# Patient Record
Sex: FEMALE | Race: WHITE | NOT HISPANIC OR LATINO | Employment: OTHER | URBAN - METROPOLITAN AREA
[De-identification: names, ages, dates, MRNs, and addresses within clinical notes are randomized per-mention and may not be internally consistent; named-entity substitution may affect disease eponyms.]

---

## 2017-07-17 ENCOUNTER — HISTORICAL (OUTPATIENT)
Dept: ADMINISTRATIVE | Facility: HOSPITAL | Age: 67
End: 2017-07-17

## 2017-07-17 LAB
ALBUMIN SERPL-MCNC: 3.6 GM/DL (ref 3.4–5)
ALBUMIN/GLOB SERPL: 1 RATIO (ref 1–2)
ALP SERPL-CCNC: 132 UNIT/L (ref 45–117)
ALT SERPL-CCNC: 40 UNIT/L (ref 12–78)
APPEARANCE, UA: ABNORMAL
AST SERPL-CCNC: 25 UNIT/L (ref 15–37)
BACTERIA #/AREA URNS AUTO: ABNORMAL /[HPF]
BILIRUB SERPL-MCNC: 0.4 MG/DL (ref 0.2–1)
BILIRUB UR QL STRIP: NEGATIVE
BILIRUBIN DIRECT+TOT PNL SERPL-MCNC: <0.1 MG/DL
BILIRUBIN DIRECT+TOT PNL SERPL-MCNC: >0.3 MG/DL
BUN SERPL-MCNC: 9 MG/DL (ref 7–18)
CALCIUM SERPL-MCNC: 9 MG/DL (ref 8.5–10.1)
CHLORIDE SERPL-SCNC: 101 MMOL/L (ref 98–107)
CO2 SERPL-SCNC: 32 MMOL/L (ref 21–32)
COLOR UR: YELLOW
CREAT SERPL-MCNC: 1 MG/DL (ref 0.6–1.3)
GLOBULIN SER-MCNC: 4.3 GM/ML (ref 2.3–3.5)
GLUCOSE (UA): NORMAL
GLUCOSE SERPL-MCNC: 104 MG/DL (ref 74–106)
HAV IGM SERPL QL IA: NONREACTIVE
HBV CORE IGM SERPL QL IA: NONREACTIVE
HBV SURFACE AG SERPL QL IA: NEGATIVE
HCV AB SERPL QL IA: NONREACTIVE
HGB UR QL STRIP: 0.03 MG/DL
HIV 1+2 AB+HIV1 P24 AG SERPL QL IA: NONREACTIVE
HYALINE CASTS #/AREA URNS LPF: ABNORMAL /[LPF]
KETONES UR QL STRIP: ABNORMAL
LEUKOCYTE ESTERASE UR QL STRIP: 500 LEU/UL
NITRITE UR QL STRIP: NEGATIVE
PH UR STRIP: 5.5 [PH] (ref 4.5–8)
POTASSIUM SERPL-SCNC: 4.4 MMOL/L (ref 3.5–5.1)
PROT SERPL-MCNC: 7.9 GM/DL (ref 6.4–8.2)
PROT UR QL STRIP: 20 MG/DL
RBC #/AREA URNS AUTO: ABNORMAL /[HPF]
SODIUM SERPL-SCNC: 139 MMOL/L (ref 136–145)
SP GR UR STRIP: 1.03 (ref 1–1.03)
SQUAMOUS #/AREA URNS LPF: >100 /[LPF]
UROBILINOGEN UR STRIP-ACNC: 2 MG/DL
WBC #/AREA URNS AUTO: ABNORMAL /HPF

## 2017-07-19 LAB — FINAL CULTURE: NORMAL

## 2017-07-21 ENCOUNTER — HISTORICAL (OUTPATIENT)
Dept: INTERNAL MEDICINE | Facility: CLINIC | Age: 67
End: 2017-07-21

## 2017-07-21 LAB
APPEARANCE, UA: CLEAR
BACTERIA #/AREA URNS AUTO: ABNORMAL /[HPF]
BILIRUB UR QL STRIP: NEGATIVE
COLOR UR: YELLOW
GLUCOSE (UA): NORMAL
HGB UR QL STRIP: NEGATIVE
HYALINE CASTS #/AREA URNS LPF: ABNORMAL /[LPF]
KETONES UR QL STRIP: NEGATIVE
LEUKOCYTE ESTERASE UR QL STRIP: NEGATIVE
NITRITE UR QL STRIP: NEGATIVE
PH UR STRIP: 5.5 [PH] (ref 4.5–8)
PROT UR QL STRIP: NEGATIVE
RBC #/AREA URNS AUTO: ABNORMAL /[HPF]
SP GR UR STRIP: 1.01 (ref 1–1.03)
SQUAMOUS #/AREA URNS LPF: ABNORMAL /[LPF]
UROBILINOGEN UR STRIP-ACNC: NORMAL
WBC #/AREA URNS AUTO: ABNORMAL /HPF

## 2017-07-23 LAB — FINAL CULTURE: NO GROWTH

## 2017-10-17 ENCOUNTER — HISTORICAL (OUTPATIENT)
Dept: RADIOLOGY | Facility: HOSPITAL | Age: 67
End: 2017-10-17

## 2017-11-07 ENCOUNTER — HISTORICAL (OUTPATIENT)
Dept: INTERNAL MEDICINE | Facility: CLINIC | Age: 67
End: 2017-11-07

## 2017-11-07 LAB
ABS NEUT (OLG): 5.78 X10(3)/MCL (ref 2.1–9.2)
ALBUMIN SERPL-MCNC: 3.5 GM/DL (ref 3.4–5)
ALBUMIN/GLOB SERPL: 1 RATIO (ref 1–2)
ALP SERPL-CCNC: 126 UNIT/L (ref 45–117)
ALT SERPL-CCNC: 43 UNIT/L (ref 12–78)
APPEARANCE, UA: ABNORMAL
AST SERPL-CCNC: 23 UNIT/L (ref 15–37)
BACTERIA #/AREA URNS AUTO: ABNORMAL /[HPF]
BASOPHILS # BLD AUTO: 0.08 X10(3)/MCL
BASOPHILS NFR BLD AUTO: 1 % (ref 0–1)
BILIRUB SERPL-MCNC: 0.3 MG/DL (ref 0.2–1)
BILIRUB UR QL STRIP: NEGATIVE
BILIRUBIN DIRECT+TOT PNL SERPL-MCNC: <0.1 MG/DL
BILIRUBIN DIRECT+TOT PNL SERPL-MCNC: ABNORMAL MG/DL
BUN SERPL-MCNC: 10 MG/DL (ref 7–18)
CALCIUM SERPL-MCNC: 9.1 MG/DL (ref 8.5–10.1)
CHLORIDE SERPL-SCNC: 103 MMOL/L (ref 98–107)
CO2 SERPL-SCNC: 32 MMOL/L (ref 21–32)
COLOR UR: YELLOW
CREAT SERPL-MCNC: 0.9 MG/DL (ref 0.6–1.3)
CREAT UR-MCNC: 402 MG/DL
DEPRECATED CALCIDIOL+CALCIFEROL SERPL-MC: 25.37 NG/ML (ref 30–80)
EOSINOPHIL # BLD AUTO: 0.48 X10(3)/MCL
EOSINOPHIL NFR BLD AUTO: 4 % (ref 0–5)
ERYTHROCYTE [DISTWIDTH] IN BLOOD BY AUTOMATED COUNT: 12.8 % (ref 11.5–14.5)
GLOBULIN SER-MCNC: 4.6 GM/ML (ref 2.3–3.5)
GLUCOSE (UA): NORMAL
GLUCOSE SERPL-MCNC: 103 MG/DL (ref 74–106)
HCT VFR BLD AUTO: 42.1 % (ref 35–46)
HGB BLD-MCNC: 14.5 GM/DL (ref 12–16)
HGB UR QL STRIP: NEGATIVE
HYALINE CASTS #/AREA URNS LPF: ABNORMAL /[LPF]
IMM GRANULOCYTES # BLD AUTO: 0.05 10*3/UL
IMM GRANULOCYTES NFR BLD AUTO: 0 %
KETONES UR QL STRIP: ABNORMAL
LEUKOCYTE ESTERASE UR QL STRIP: 75 LEU/UL
LYMPHOCYTES # BLD AUTO: 3.55 X10(3)/MCL
LYMPHOCYTES NFR BLD AUTO: 33 % (ref 15–40)
MAGNESIUM SERPL-MCNC: 2.3 MG/DL (ref 1.8–2.4)
MCH RBC QN AUTO: 31.9 PG (ref 26–34)
MCHC RBC AUTO-ENTMCNC: 34.4 GM/DL (ref 31–37)
MCV RBC AUTO: 92.5 FL (ref 80–100)
MONOCYTES # BLD AUTO: 0.71 X10(3)/MCL
MONOCYTES NFR BLD AUTO: 7 % (ref 4–12)
NEUTROPHILS # BLD AUTO: 5.78 X10(3)/MCL
NEUTROPHILS NFR BLD AUTO: 54 X10(3)/MCL
NITRITE UR QL STRIP: NEGATIVE
PH UR STRIP: 5.5 [PH] (ref 4.5–8)
PHOSPHATE SERPL-MCNC: 3.3 MG/DL (ref 2.5–4.9)
PLATELET # BLD AUTO: 289 X10(3)/MCL (ref 130–400)
PMV BLD AUTO: 9.7 FL (ref 7.4–10.4)
POTASSIUM SERPL-SCNC: 4.2 MMOL/L (ref 3.5–5.1)
PROT SERPL-MCNC: 8.1 GM/DL (ref 6.4–8.2)
PROT UR QL STRIP: 30 MG/DL
PROT UR STRIP-MCNC: 29.7 MG/DL
PROT/CREAT UR-RTO: 73.9 MG/GM
RBC # BLD AUTO: 4.55 X10(6)/MCL (ref 4–5.2)
RBC #/AREA URNS AUTO: ABNORMAL /[HPF]
SODIUM SERPL-SCNC: 140 MMOL/L (ref 136–145)
SP GR UR STRIP: 1.03 (ref 1–1.03)
SQUAMOUS #/AREA URNS LPF: >100 /[LPF]
UROBILINOGEN UR STRIP-ACNC: 2 MG/DL
WBC # SPEC AUTO: 10.6 X10(3)/MCL (ref 4.5–11)
WBC #/AREA URNS AUTO: ABNORMAL /HPF

## 2017-11-16 ENCOUNTER — HISTORICAL (OUTPATIENT)
Dept: RADIOLOGY | Facility: HOSPITAL | Age: 67
End: 2017-11-16

## 2018-01-09 ENCOUNTER — HISTORICAL (OUTPATIENT)
Dept: INTERNAL MEDICINE | Facility: CLINIC | Age: 68
End: 2018-01-09

## 2018-01-09 LAB
ABS NEUT (OLG): 5.59 X10(3)/MCL (ref 2.1–9.2)
ALBUMIN SERPL-MCNC: 3.6 GM/DL (ref 3.4–5)
ALBUMIN/GLOB SERPL: 1 RATIO (ref 1–2)
ALP SERPL-CCNC: 124 UNIT/L (ref 45–117)
ALT SERPL-CCNC: 41 UNIT/L (ref 12–78)
APPEARANCE, UA: CLEAR
AST SERPL-CCNC: 20 UNIT/L (ref 15–37)
BACTERIA #/AREA URNS AUTO: ABNORMAL /[HPF]
BASOPHILS # BLD AUTO: 0.1 X10(3)/MCL
BASOPHILS NFR BLD AUTO: 1 % (ref 0–1)
BILIRUB SERPL-MCNC: 0.3 MG/DL (ref 0.2–1)
BILIRUB UR QL STRIP: NEGATIVE
BILIRUBIN DIRECT+TOT PNL SERPL-MCNC: <0.1 MG/DL
BILIRUBIN DIRECT+TOT PNL SERPL-MCNC: ABNORMAL MG/DL
BUN SERPL-MCNC: 11 MG/DL (ref 7–18)
CALCIUM SERPL-MCNC: 9.3 MG/DL (ref 8.5–10.1)
CHLORIDE SERPL-SCNC: 101 MMOL/L (ref 98–107)
CHOLEST SERPL-MCNC: 199 MG/DL
CHOLEST/HDLC SERPL: 4.3 {RATIO} (ref 0–4.4)
CO2 SERPL-SCNC: 31 MMOL/L (ref 21–32)
COLOR UR: YELLOW
CREAT SERPL-MCNC: 0.9 MG/DL (ref 0.6–1.3)
EOSINOPHIL # BLD AUTO: 0.56 X10(3)/MCL
EOSINOPHIL NFR BLD AUTO: 5 % (ref 0–5)
ERYTHROCYTE [DISTWIDTH] IN BLOOD BY AUTOMATED COUNT: 12.8 % (ref 11.5–14.5)
EST. AVERAGE GLUCOSE BLD GHB EST-MCNC: 123 MG/DL
GLOBULIN SER-MCNC: 4.7 GM/ML (ref 2.3–3.5)
GLUCOSE (UA): NORMAL
GLUCOSE SERPL-MCNC: 97 MG/DL (ref 74–106)
HBA1C MFR BLD: 5.9 % (ref 4.2–6.3)
HCT VFR BLD AUTO: 44.3 % (ref 35–46)
HDLC SERPL-MCNC: 46 MG/DL
HGB BLD-MCNC: 14.7 GM/DL (ref 12–16)
HGB UR QL STRIP: NEGATIVE
HYALINE CASTS #/AREA URNS LPF: ABNORMAL /[LPF]
IMM GRANULOCYTES # BLD AUTO: 0.03 10*3/UL
IMM GRANULOCYTES NFR BLD AUTO: 0 %
KETONES UR QL STRIP: NEGATIVE
LDLC SERPL CALC-MCNC: 98 MG/DL (ref 0–130)
LEUKOCYTE ESTERASE UR QL STRIP: 75 LEU/UL
LYMPHOCYTES # BLD AUTO: 3.69 X10(3)/MCL
LYMPHOCYTES NFR BLD AUTO: 35 % (ref 15–40)
MCH RBC QN AUTO: 31 PG (ref 26–34)
MCHC RBC AUTO-ENTMCNC: 33.2 GM/DL (ref 31–37)
MCV RBC AUTO: 93.5 FL (ref 80–100)
MONOCYTES # BLD AUTO: 0.67 X10(3)/MCL
MONOCYTES NFR BLD AUTO: 6 % (ref 4–12)
NEUTROPHILS # BLD AUTO: 5.59 X10(3)/MCL
NEUTROPHILS NFR BLD AUTO: 52 X10(3)/MCL
NITRITE UR QL STRIP: NEGATIVE
PH UR STRIP: 6 [PH] (ref 4.5–8)
PLATELET # BLD AUTO: 305 X10(3)/MCL (ref 130–400)
PMV BLD AUTO: 9.4 FL (ref 7.4–10.4)
POTASSIUM SERPL-SCNC: 4.3 MMOL/L (ref 3.5–5.1)
PROT SERPL-MCNC: 8.3 GM/DL (ref 6.4–8.2)
PROT UR QL STRIP: 10 MG/DL
RBC # BLD AUTO: 4.74 X10(6)/MCL (ref 4–5.2)
RBC #/AREA URNS AUTO: ABNORMAL /[HPF]
SODIUM SERPL-SCNC: 140 MMOL/L (ref 136–145)
SP GR UR STRIP: 1.01 (ref 1–1.03)
SQUAMOUS #/AREA URNS LPF: >100 /[LPF]
TRIGL SERPL-MCNC: 277 MG/DL
TSH SERPL-ACNC: 0.85 MIU/L (ref 0.36–3.74)
UROBILINOGEN UR STRIP-ACNC: NORMAL MG/DL
VLDLC SERPL CALC-MCNC: 55 MG/DL
WBC # SPEC AUTO: 10.6 X10(3)/MCL (ref 4.5–11)
WBC #/AREA URNS AUTO: ABNORMAL /HPF

## 2018-01-15 ENCOUNTER — HISTORICAL (OUTPATIENT)
Dept: CARDIOLOGY | Facility: CLINIC | Age: 68
End: 2018-01-15

## 2018-01-15 LAB — T4 FREE SERPL-MCNC: 0.91 NG/DL (ref 0.76–1.46)

## 2018-02-28 ENCOUNTER — HISTORICAL (OUTPATIENT)
Dept: CARDIOLOGY | Facility: CLINIC | Age: 68
End: 2018-02-28

## 2018-05-15 ENCOUNTER — HISTORICAL (OUTPATIENT)
Dept: ADMINISTRATIVE | Facility: HOSPITAL | Age: 68
End: 2018-05-15

## 2018-05-16 ENCOUNTER — HISTORICAL (OUTPATIENT)
Dept: RADIOLOGY | Facility: HOSPITAL | Age: 68
End: 2018-05-16

## 2018-10-15 ENCOUNTER — HISTORICAL (OUTPATIENT)
Dept: CARDIOLOGY | Facility: CLINIC | Age: 68
End: 2018-10-15

## 2018-10-18 ENCOUNTER — HISTORICAL (OUTPATIENT)
Dept: INTERNAL MEDICINE | Facility: CLINIC | Age: 68
End: 2018-10-18

## 2018-11-15 ENCOUNTER — HISTORICAL (OUTPATIENT)
Dept: INTERNAL MEDICINE | Facility: CLINIC | Age: 68
End: 2018-11-15

## 2019-01-17 ENCOUNTER — HISTORICAL (OUTPATIENT)
Dept: ADMINISTRATIVE | Facility: HOSPITAL | Age: 69
End: 2019-01-17

## 2019-01-17 ENCOUNTER — HISTORICAL (OUTPATIENT)
Dept: INTERNAL MEDICINE | Facility: CLINIC | Age: 69
End: 2019-01-17

## 2019-01-19 LAB — FINAL CULTURE: NORMAL

## 2019-01-24 ENCOUNTER — HISTORICAL (OUTPATIENT)
Dept: INTERNAL MEDICINE | Facility: CLINIC | Age: 69
End: 2019-01-24

## 2019-01-26 LAB — FINAL CULTURE: NO GROWTH

## 2019-04-10 ENCOUNTER — HISTORICAL (OUTPATIENT)
Dept: INTERNAL MEDICINE | Facility: CLINIC | Age: 69
End: 2019-04-10

## 2019-05-15 ENCOUNTER — HISTORICAL (OUTPATIENT)
Dept: INTERNAL MEDICINE | Facility: CLINIC | Age: 69
End: 2019-05-15

## 2019-06-20 ENCOUNTER — HISTORICAL (OUTPATIENT)
Dept: OTOLARYNGOLOGY | Facility: CLINIC | Age: 69
End: 2019-06-20

## 2019-09-17 ENCOUNTER — HISTORICAL (OUTPATIENT)
Dept: INTERNAL MEDICINE | Facility: CLINIC | Age: 69
End: 2019-09-17

## 2019-09-17 LAB
ABS NEUT (OLG): 5.91 X10(3)/MCL (ref 2.1–9.2)
ALBUMIN SERPL-MCNC: 3.4 GM/DL (ref 3.4–5)
ALBUMIN/GLOB SERPL: 0.8 RATIO (ref 1.1–2)
ALP SERPL-CCNC: 124 UNIT/L (ref 45–117)
ALT SERPL-CCNC: 31 UNIT/L (ref 12–78)
AST SERPL-CCNC: 22 UNIT/L (ref 15–37)
BASOPHILS # BLD AUTO: 0.1 X10(3)/MCL (ref 0–0.2)
BASOPHILS NFR BLD AUTO: 1 %
BILIRUB SERPL-MCNC: 0.3 MG/DL (ref 0.2–1)
BILIRUBIN DIRECT+TOT PNL SERPL-MCNC: <0.1 MG/DL (ref 0–0.2)
BILIRUBIN DIRECT+TOT PNL SERPL-MCNC: ABNORMAL MG/DL
BUN SERPL-MCNC: 10 MG/DL (ref 7–18)
CALCIUM SERPL-MCNC: 8.6 MG/DL (ref 8.5–10.1)
CHLORIDE SERPL-SCNC: 103 MMOL/L (ref 98–107)
CHOLEST SERPL-MCNC: 153 MG/DL
CHOLEST/HDLC SERPL: 3.4 {RATIO} (ref 0–4.4)
CO2 SERPL-SCNC: 30 MMOL/L (ref 21–32)
CREAT SERPL-MCNC: 0.9 MG/DL (ref 0.6–1.3)
EOSINOPHIL # BLD AUTO: 0.4 X10(3)/MCL (ref 0–0.9)
EOSINOPHIL NFR BLD AUTO: 4 %
ERYTHROCYTE [DISTWIDTH] IN BLOOD BY AUTOMATED COUNT: 13 % (ref 11.5–14.5)
EST. AVERAGE GLUCOSE BLD GHB EST-MCNC: 143 MG/DL
GLOBULIN SER-MCNC: 4.5 GM/ML (ref 2.3–3.5)
GLUCOSE SERPL-MCNC: 93 MG/DL (ref 74–106)
HBA1C MFR BLD: 6.6 % (ref 4.2–6.3)
HCT VFR BLD AUTO: 43.2 % (ref 35–46)
HDLC SERPL-MCNC: 45 MG/DL (ref 40–59)
HGB BLD-MCNC: 13.9 GM/DL (ref 12–16)
IMM GRANULOCYTES # BLD AUTO: 0.04 10*3/UL
IMM GRANULOCYTES NFR BLD AUTO: 0 %
LDLC SERPL CALC-MCNC: 66 MG/DL
LYMPHOCYTES # BLD AUTO: 3.3 X10(3)/MCL (ref 0.6–4.6)
LYMPHOCYTES NFR BLD AUTO: 32 %
MCH RBC QN AUTO: 30.4 PG (ref 26–34)
MCHC RBC AUTO-ENTMCNC: 32.2 GM/DL (ref 31–37)
MCV RBC AUTO: 94.5 FL (ref 80–100)
MONOCYTES # BLD AUTO: 0.7 X10(3)/MCL (ref 0.1–1.3)
MONOCYTES NFR BLD AUTO: 6 %
NEUTROPHILS # BLD AUTO: 5.91 X10(3)/MCL (ref 2.1–9.2)
NEUTROPHILS NFR BLD AUTO: 56 %
PLATELET # BLD AUTO: 295 X10(3)/MCL (ref 130–400)
PMV BLD AUTO: 9.2 FL (ref 7.4–10.4)
POTASSIUM SERPL-SCNC: 4.4 MMOL/L (ref 3.5–5.1)
PROT SERPL-MCNC: 7.9 GM/DL (ref 6.4–8.2)
RBC # BLD AUTO: 4.57 X10(6)/MCL (ref 4–5.2)
SODIUM SERPL-SCNC: 138 MMOL/L (ref 136–145)
TRIGL SERPL-MCNC: 211 MG/DL
VLDLC SERPL CALC-MCNC: 42 MG/DL
WBC # SPEC AUTO: 10.4 X10(3)/MCL (ref 4.5–11)

## 2019-10-30 ENCOUNTER — HISTORICAL (OUTPATIENT)
Dept: RADIOLOGY | Facility: HOSPITAL | Age: 69
End: 2019-10-30

## 2019-12-11 ENCOUNTER — HISTORICAL (OUTPATIENT)
Dept: INTERNAL MEDICINE | Facility: CLINIC | Age: 69
End: 2019-12-11

## 2019-12-12 ENCOUNTER — HISTORICAL (OUTPATIENT)
Dept: ADMINISTRATIVE | Facility: HOSPITAL | Age: 69
End: 2019-12-12

## 2020-05-07 ENCOUNTER — HISTORICAL (OUTPATIENT)
Dept: INTERNAL MEDICINE | Facility: CLINIC | Age: 70
End: 2020-05-07

## 2020-09-08 ENCOUNTER — HISTORICAL (OUTPATIENT)
Dept: INTERNAL MEDICINE | Facility: CLINIC | Age: 70
End: 2020-09-08

## 2020-09-15 ENCOUNTER — HISTORICAL (OUTPATIENT)
Dept: RADIOLOGY | Facility: HOSPITAL | Age: 70
End: 2020-09-15

## 2020-10-02 ENCOUNTER — HISTORICAL (OUTPATIENT)
Dept: RESPIRATORY THERAPY | Facility: HOSPITAL | Age: 70
End: 2020-10-02

## 2020-10-13 ENCOUNTER — HISTORICAL (OUTPATIENT)
Dept: RADIOLOGY | Facility: HOSPITAL | Age: 70
End: 2020-10-13

## 2021-02-24 ENCOUNTER — HISTORICAL (OUTPATIENT)
Dept: INTERNAL MEDICINE | Facility: CLINIC | Age: 71
End: 2021-02-24

## 2021-05-12 ENCOUNTER — HISTORICAL (OUTPATIENT)
Dept: NEPHROLOGY | Facility: CLINIC | Age: 71
End: 2021-05-12

## 2021-05-12 LAB
ABS NEUT (OLG): 6.16 X10(3)/MCL (ref 2.1–9.2)
ALBUMIN SERPL-MCNC: 3.5 GM/DL (ref 3.4–4.8)
ALBUMIN/GLOB SERPL: 0.9 RATIO (ref 1.1–2)
ALP SERPL-CCNC: 124 UNIT/L (ref 40–150)
ALT SERPL-CCNC: 28 UNIT/L (ref 0–55)
APPEARANCE, UA: CLEAR
AST SERPL-CCNC: 21 UNIT/L (ref 5–34)
BACTERIA #/AREA URNS AUTO: ABNORMAL /HPF
BASOPHILS # BLD AUTO: 0.1 X10(3)/MCL (ref 0–0.2)
BASOPHILS NFR BLD AUTO: 1 %
BILIRUB SERPL-MCNC: 0.4 MG/DL
BILIRUB UR QL STRIP: NEGATIVE
BILIRUBIN DIRECT+TOT PNL SERPL-MCNC: 0.2 MG/DL (ref 0–0.5)
BILIRUBIN DIRECT+TOT PNL SERPL-MCNC: 0.2 MG/DL (ref 0–0.8)
BUN SERPL-MCNC: 6.6 MG/DL (ref 9.8–20.1)
CALCIUM SERPL-MCNC: 9.8 MG/DL (ref 8.4–10.2)
CHLORIDE SERPL-SCNC: 100 MMOL/L (ref 98–107)
CO2 SERPL-SCNC: 29 MMOL/L (ref 23–31)
COLOR UR: ABNORMAL
CREAT SERPL-MCNC: 0.86 MG/DL (ref 0.55–1.02)
CREAT UR-MCNC: 48.2 MG/DL (ref 45–106)
EOSINOPHIL # BLD AUTO: 0.4 X10(3)/MCL (ref 0–0.9)
EOSINOPHIL NFR BLD AUTO: 3 %
ERYTHROCYTE [DISTWIDTH] IN BLOOD BY AUTOMATED COUNT: 13.2 % (ref 11.5–14.5)
GLOBULIN SER-MCNC: 4 GM/DL (ref 2.4–3.5)
GLUCOSE (UA): NEGATIVE
GLUCOSE SERPL-MCNC: 97 MG/DL (ref 82–115)
HCT VFR BLD AUTO: 42.7 % (ref 35–46)
HGB BLD-MCNC: 13.8 GM/DL (ref 12–16)
HGB UR QL STRIP: NEGATIVE
HYALINE CASTS #/AREA URNS LPF: ABNORMAL /LPF
IMM GRANULOCYTES # BLD AUTO: 0.04 10*3/UL
IMM GRANULOCYTES NFR BLD AUTO: 0 %
KETONES UR QL STRIP: NEGATIVE
LEUKOCYTE ESTERASE UR QL STRIP: NEGATIVE
LYMPHOCYTES # BLD AUTO: 3.4 X10(3)/MCL (ref 0.6–4.6)
LYMPHOCYTES NFR BLD AUTO: 32 %
MCH RBC QN AUTO: 30.3 PG (ref 26–34)
MCHC RBC AUTO-ENTMCNC: 32.3 GM/DL (ref 31–37)
MCV RBC AUTO: 93.8 FL (ref 80–100)
MONOCYTES # BLD AUTO: 0.7 X10(3)/MCL (ref 0.1–1.3)
MONOCYTES NFR BLD AUTO: 6 %
NEUTROPHILS # BLD AUTO: 6.16 X10(3)/MCL (ref 2.1–9.2)
NEUTROPHILS NFR BLD AUTO: 57 %
NITRITE UR QL STRIP: NEGATIVE
PH UR STRIP: 5 [PH] (ref 4.5–8)
PLATELET # BLD AUTO: 319 X10(3)/MCL (ref 130–400)
PMV BLD AUTO: 9 FL (ref 7.4–10.4)
POTASSIUM SERPL-SCNC: 4.7 MMOL/L (ref 3.5–5.1)
PROT SERPL-MCNC: 7.5 GM/DL (ref 5.8–7.6)
PROT UR QL STRIP: NEGATIVE
PROT UR STRIP-MCNC: <6.8 MG/DL
PROT/CREAT UR-RTO: NORMAL MG/GM CR
RBC # BLD AUTO: 4.55 X10(6)/MCL (ref 4–5.2)
RBC #/AREA URNS AUTO: ABNORMAL /HPF
SODIUM SERPL-SCNC: 137 MMOL/L (ref 136–145)
SP GR UR STRIP: 1 (ref 1–1.03)
SQUAMOUS #/AREA URNS LPF: ABNORMAL /LPF
UROBILINOGEN UR STRIP-ACNC: NORMAL
WBC # SPEC AUTO: 10.8 X10(3)/MCL (ref 4.5–11)
WBC #/AREA URNS AUTO: ABNORMAL /HPF

## 2021-06-15 ENCOUNTER — HISTORICAL (OUTPATIENT)
Dept: RADIOLOGY | Facility: HOSPITAL | Age: 71
End: 2021-06-15

## 2021-08-31 ENCOUNTER — HISTORICAL (OUTPATIENT)
Dept: INTERNAL MEDICINE | Facility: CLINIC | Age: 71
End: 2021-08-31

## 2021-08-31 LAB
ABS NEUT (OLG): 5.9 X10(3)/MCL (ref 2.1–9.2)
ALBUMIN SERPL-MCNC: 3.5 GM/DL (ref 3.4–4.8)
ALBUMIN/GLOB SERPL: 0.9 RATIO (ref 1.1–2)
ALP SERPL-CCNC: 103 UNIT/L (ref 40–150)
ALT SERPL-CCNC: 25 UNIT/L (ref 0–55)
AST SERPL-CCNC: 23 UNIT/L (ref 5–34)
BASOPHILS # BLD AUTO: 0.1 X10(3)/MCL (ref 0–0.2)
BASOPHILS NFR BLD AUTO: 1 %
BILIRUB SERPL-MCNC: 0.4 MG/DL
BILIRUBIN DIRECT+TOT PNL SERPL-MCNC: 0.2 MG/DL (ref 0–0.5)
BILIRUBIN DIRECT+TOT PNL SERPL-MCNC: 0.2 MG/DL (ref 0–0.8)
BUN SERPL-MCNC: 8.8 MG/DL (ref 9.8–20.1)
CALCIUM SERPL-MCNC: 9.6 MG/DL (ref 8.4–10.2)
CHLORIDE SERPL-SCNC: 101 MMOL/L (ref 98–107)
CHOLEST SERPL-MCNC: 144 MG/DL
CHOLEST/HDLC SERPL: 4 {RATIO} (ref 0–5)
CO2 SERPL-SCNC: 30 MMOL/L (ref 23–31)
CREAT SERPL-MCNC: 0.87 MG/DL (ref 0.55–1.02)
DEPRECATED CALCIDIOL+CALCIFEROL SERPL-MC: 49 NG/ML (ref 30–80)
EOSINOPHIL # BLD AUTO: 0.5 X10(3)/MCL (ref 0–0.9)
EOSINOPHIL NFR BLD AUTO: 5 %
ERYTHROCYTE [DISTWIDTH] IN BLOOD BY AUTOMATED COUNT: 13.2 % (ref 11.5–14.5)
EST. AVERAGE GLUCOSE BLD GHB EST-MCNC: 119.8 MG/DL
GLOBULIN SER-MCNC: 4.1 GM/DL (ref 2.4–3.5)
GLUCOSE SERPL-MCNC: 101 MG/DL (ref 82–115)
HBA1C MFR BLD: 5.8 %
HCT VFR BLD AUTO: 42 % (ref 35–46)
HDLC SERPL-MCNC: 36 MG/DL (ref 35–60)
HGB BLD-MCNC: 14 GM/DL (ref 12–16)
IMM GRANULOCYTES # BLD AUTO: 0.03 10*3/UL
IMM GRANULOCYTES NFR BLD AUTO: 0 %
LDLC SERPL CALC-MCNC: 70 MG/DL (ref 50–140)
LYMPHOCYTES # BLD AUTO: 3.6 X10(3)/MCL (ref 0.6–4.6)
LYMPHOCYTES NFR BLD AUTO: 33 %
MCH RBC QN AUTO: 31 PG (ref 26–34)
MCHC RBC AUTO-ENTMCNC: 33.3 GM/DL (ref 31–37)
MCV RBC AUTO: 92.9 FL (ref 80–100)
MONOCYTES # BLD AUTO: 0.7 X10(3)/MCL (ref 0.1–1.3)
MONOCYTES NFR BLD AUTO: 7 %
NEUTROPHILS # BLD AUTO: 5.9 X10(3)/MCL (ref 2.1–9.2)
NEUTROPHILS NFR BLD AUTO: 54 %
NRBC BLD AUTO-RTO: 0 % (ref 0–0.2)
PLATELET # BLD AUTO: 297 X10(3)/MCL (ref 130–400)
PMV BLD AUTO: 9 FL (ref 7.4–10.4)
POTASSIUM SERPL-SCNC: 4.8 MMOL/L (ref 3.5–5.1)
PROT SERPL-MCNC: 7.6 GM/DL (ref 5.8–7.6)
RBC # BLD AUTO: 4.52 X10(6)/MCL (ref 4–5.2)
SODIUM SERPL-SCNC: 137 MMOL/L (ref 136–145)
TRIGL SERPL-MCNC: 191 MG/DL (ref 37–140)
VLDLC SERPL CALC-MCNC: 38 MG/DL
WBC # SPEC AUTO: 10.8 X10(3)/MCL (ref 4.5–11)

## 2021-11-15 ENCOUNTER — HISTORICAL (OUTPATIENT)
Dept: RADIOLOGY | Facility: HOSPITAL | Age: 71
End: 2021-11-15

## 2022-01-04 ENCOUNTER — HISTORICAL (OUTPATIENT)
Dept: INTERNAL MEDICINE | Facility: CLINIC | Age: 72
End: 2022-01-04

## 2022-01-11 LAB
LEFT EYE DM RETINOPATHY: NEGATIVE
RIGHT EYE DM RETINOPATHY: NEGATIVE

## 2022-04-10 ENCOUNTER — HISTORICAL (OUTPATIENT)
Dept: ADMINISTRATIVE | Facility: HOSPITAL | Age: 72
End: 2022-04-10

## 2022-04-29 VITALS
DIASTOLIC BLOOD PRESSURE: 64 MMHG | DIASTOLIC BLOOD PRESSURE: 60 MMHG | HEIGHT: 62 IN | BODY MASS INDEX: 29.57 KG/M2 | BODY MASS INDEX: 27.83 KG/M2 | OXYGEN SATURATION: 99 % | WEIGHT: 160.69 LBS | SYSTOLIC BLOOD PRESSURE: 100 MMHG | WEIGHT: 151.25 LBS | WEIGHT: 162.25 LBS | BODY MASS INDEX: 27.18 KG/M2 | WEIGHT: 147.69 LBS | HEIGHT: 62 IN | DIASTOLIC BLOOD PRESSURE: 72 MMHG | BODY MASS INDEX: 29.86 KG/M2 | HEIGHT: 62 IN | SYSTOLIC BLOOD PRESSURE: 116 MMHG | DIASTOLIC BLOOD PRESSURE: 65 MMHG | SYSTOLIC BLOOD PRESSURE: 107 MMHG | SYSTOLIC BLOOD PRESSURE: 93 MMHG | HEIGHT: 62 IN | OXYGEN SATURATION: 94 %

## 2022-05-02 NOTE — HISTORICAL [facility] CERNER
This is a historical note converted from [place]. Formatting and pictures may have been removed.  Please reference [place] for original formatting and attached multimedia. Chief Complaint  6mn with labs. No cardiac complaints.  History of Present Illness  69 year old female with a past?medical history of HTN, COPD, SVT,?and Tobacco Use presents for 6 month follow up and?ongoing care.???Patient has no cardiac complaints today. ?She denies chest pain, shortness of breath, orthopnea, PND, peripheral edema, syncope, or any noticed activity limitations. ?Patient states she has?only?experienced?a few episodes of mild palpitations that she states was short-lived.? She states the palpitations did not affect her ADLs. ?She continues to follow up with?ENT clinic?for the basal cell carcinoma.??She reports compliance with her medications. ?Patient states she continues to smoke approximately half a pack of cigarettes per day,?but does not want to be referred to the smoking cessation program. ?Patient states when she is ready to quit she will try to cut back and eventually quit on her own.  Review of Systems  Constitutional: negative for fever,chills, sweats, weakness, fatigue, decreased activity?????  Eye: negative  ENMT: negative  Respiratory: negative?  Cardiovascular: rare, mild palpitations  Gastrointestinal: negative?for nausea, vomiting, abdominal pain, constipation, diarrhea  Genitourinary: negative  Hema/Lymph: negative?for bruising/bleeding tendency, negative for swollen lymph glands  Endocrine: negative  Immunologic: negative  Musculoskeletal: negative  Integumentary: negative  Neurologic: negative  Psychiatric: negative  All Other ROS: negative  Physical Exam  Vitals & Measurements  T:?37.0? ?C (Oral)? HR:?66(Peripheral)? RR:?18? BP:?116/65? SpO2:?99%? WT:?68.6?kg? WT:?68.6?kg?  General: alert and oriented/no acute distress  Eye: EOMI/normal conjunctiva/vision unchanged  HENT: normocephalic/normal hearing/moist oral  mucosa  Neck: supple/nontender/no carotid bruit/no JVD  Respiratory: exp. wheezes/nonlabored respirations/BS equal/symmetrical expansion/no chest wall tenderness  Cardiovascular: normal rate/normal rhythm/no murmur/normal peripheral perfusion/no edema  Gastrointestinal: soft/nontender/nondistended  Musculoskeletal: normal ROM/normal strength  Integumentary: warm/dry/pink/intact  Neurologic: alert/oriented/normal sensory/no focal deficits  Psychiatric: cooperative/appropriate mood and affect/normal judgment  Assessment/Plan  Advanced COPD  Denies SOB  Counseled on smoking cessation - she continues to smoke a half a pack of cigarettes per day  Continue to follow up with PCP as directed  ?  SVT (supraventricular tachycardia)  Reports rare, mild palpitations - states she feels the medications are working?  Continue diltiazem and metoprolol  ?   HTN  Controlled-continue current therapy  ?   Tobacco abuse  Counseled on the importance of smoking cessation  She continues to smoke?a half a pack of cigarettes per day, but does not want a referral to the smoking cessation program?  She states when she is ready to quit, she will try to quit on her own  ?   Basal Cell Carcinoma  Continue to follow up in the ENT clinic as directed  ?   Follow-up in cardiology clinic in 6 months  Continue to follow-up with PCP as directed  ?  ?   Problem List/Past Medical History  Ongoing  Arthritis(  Confirmed  )  COPD (chronic obstructive pulmonary disease)(  Confirmed  )  HTN (hypertension)  IGT (impaired glucose tolerance)  Osteoporosis(  Confirmed  )  SOB (shortness of breath)(  Confirmed  )  SVT (supraventricular tachycardia)  Thyroid nodule  Tobacco abuse  Historical  CKD (chronic kidney disease) stage 3, GFR 30-59 ml/min  SVT (supraventricular tachycardia)  Procedure/Surgical History  Vaginal hysterectomy (1986)  Tonsillectomy and adenoidectomy (1964)  partial hysterectomy  Tonsillectomy and adenoidectomy; age 13 or over    Medications  aspirin 81 mg oral tablet, CHEWABLE, 81 mg= 1 tab(s), Oral, Daily, 3 refills  calcium (as carbonate)-vitamin D 500 mg-125 intl units oral tablet, 1 tab(s), Oral, Daily  diltiazem 90 mg oral TABlet (immed. release), 90 mg= 1 tab(s), Oral, TID, 3 refills  Incruse Ellipta 62.5 mcg/inh inhalation powder, 1 EA, INH, q24hr, 6 refills  Metoprolol Tartrate 25 mg oral tablet, 25 mg= 1 tab(s), Oral, BID, 3 refills  Nebulizer Machine, See Instructions  Pravastatin 40 mg Oral Tab, 40 mg= 1 tab(s), Oral, Daily, 3 refills  Ventolin HFA 90 mcg/inh inhalation aerosol, 1 puff(s), INH, q4hr, PRN, 2 refills  Allergies  naproxen?(Hives)  zinc sulfate ophthalmic?(LATE EFFECT OF BURNS OF OTHER SPECIFIED SITES)  Social History  Alcohol - Denies Alcohol Use, 10/12/2014  Never, 07/17/2017  Employment/School  Unemployed, 01/11/2016  Exercise  Exercise type: does not excercise., 01/11/2016  Home/Environment  Lives with Significant other. Alcohol abuse in household: No. Substance abuse in household: No. Smoker in household: Yes. Feels unsafe at home: No. Safe place to go: Yes. Family/Friends available for support: Yes., 01/11/2016  Nutrition/Health  Regular, 01/11/2016  Sexual  Substance Abuse - Denies Substance Abuse, 12/23/2014  Never, 11/14/2017  Tobacco  10 or more cigarettes (1/2 pack or more)/day in last 30 days, Cigarettes, No, Ready to change: No. Smokeless Tobacco Use: Never., 01/17/2019  Family History  Metastatic cancer: Father.  Primary malignant neoplasm of colon: Mother.  Health Maintenance  Health Maintenance  ???Pending?(in the next year)  ??? ??OverDue  ??? ? ? ?COPD Maintenance-Spirometry due??and every?  ??? ? ? ?Pneumococcal Vaccine due??and every?  ??? ? ? ?Alcohol Misuse Screening due??07/17/18??and every 1??year(s)  ??? ??Due?  ??? ? ? ?Aspirin Therapy for CVD Prevention due??04/13/19??and every 1??year(s)  ??? ? ? ?Cognitive Screening due??04/17/19??and every 1??year(s)  ??? ? ? ?Lung Cancer Screening  due??04/17/19??and every 1??year(s)  ??? ??Due In Future?  ??? ? ? ?Functional Assessment not due until??07/16/19??and every 1??year(s)  ??? ? ? ?Advance Directive not due until??07/31/19??and every 1??year(s)  ??? ? ? ?Smoking Cessation not due until??08/09/19??and every 1??year(s)  ??? ? ? ?Colorectal Screening ([facility]) not due until??11/14/19??and every 1??year(s)  ??? ? ? ?ADL Screening not due until??01/17/20??and every 1??year(s)  ??? ? ? ?Fall Risk Assessment not due until??01/17/20??and every 1??year(s)  ??? ? ? ?Geriatric Depression Screening not due until??01/17/20??and every 1??year(s)  ??? ? ? ?Obesity Screening not due until??01/17/20??and every 1??year(s)  ???Satisfied?(in the past 1 year)  ??? ??Satisfied?  ??? ? ? ?ADL Screening on??01/17/19.??Satisfied by Tee BARTOLOMEN, Rhina Yary  ??? ? ? ?Advance Directive on??07/31/18.??Satisfied by Soniya Montano  ??? ? ? ?Blood Pressure Screening on??01/17/19.??Satisfied by Tee BARTOLOMEN, Rhina Yary  ??? ? ? ?Body Mass Index Check on??01/17/19.??Satisfied by Tee LPN, Rhina Yary  ??? ? ? ?Breast Cancer Screening ([facility]) on??10/18/18.??Satisfied by Kat Dorman  ??? ? ? ?Colorectal Screening ([facility]) on??11/14/18.??Satisfied by Luis Edmond  ??? ? ? ?Depression Screening on??01/17/19.??Satisfied by Tee LPN, Rhina Yary  ??? ? ? ?Diabetes Screening on??04/10/19.??Satisfied by Liana Espitia  ??? ? ? ?Fall Risk Assessment on??01/17/19.??Satisfied by Rhina Oliveira LPN  ??? ? ? ?Functional Assessment on??07/16/18.??Satisfied by Annette EATON, Harika  ??? ? ? ?Geriatric Depression Screening on??01/17/19.??Satisfied by Rhina Oliveira LPN  ??? ? ? ?Hypertension Management-BMP on??04/10/19.??Satisfied by Liana Espitia  ??? ? ? ?Influenza Vaccine on??01/07/19.  ??? ? ? ?Lipid Screening on??04/10/19.??Satisfied by Liana Espitia  ??? ? ? ?Obesity Screening on??01/17/19.??Satisfied by Rhina Oliveira LPN  ???  ? ? ?Smoking Cessation on??08/09/18.??Satisfied by Jennifer Kulkarni LPN  ??? ? ? ?Tetanus Vaccine on??07/16/18.??Satisfied by Rhina Oliveira LPN  ?  ?

## 2022-05-02 NOTE — HISTORICAL [facility] CERNER
This is a historical note converted from [place]. Formatting and pictures may have been removed.  Please reference [employer] for original formatting and attached multimedia. Chief Complaint  Follow up,treated recently for Bronchitis feeling a little better but still congeseted  History of Present Illness  69 year old?white female here for f/u labs. PMH SVT, HLD, HTN, CKD, COPD,?impaired glucose,?Osteopenia, basal cell carcinoma, tobacco user. Pt was seen in ED on 12/1/19 and again on 12/6/19 for Acute Bronchitis/COPD exacerbation. CXR and EKG were normal. Flu swab was negative. She was treated and DCd with Duonebs, medrol and Levaquin 500 qd x 7 days. She states the steroids were too much, and caused her too itch and yesterday she felt like her HR was elevated; when the ambulance arrived, they put her on the monitor and said it was regular and she was not in SVT. She then decided to wait for her apt with IM today. Vitals stable today. She is using her nebulizer every 6 hrs as needed. States she feels better but is not yet 100 percent, continues to have a light yellow mucous noted with cough. Resting well, and does not appear ill today. She is asking for a cream for her rash to her left arm.?Pt states she is not ready to quit smoking; 10 cig/d. Pt is followed by Renal clinic for stage 3?CKD, cardiology clinic for SVT and medications, and ENT for basal cell carcinoma (nasal lesion) and thyroid nodule. Last episode of SVT was in 2014.??Is not using?Incruse regularly due to does not like the powder.??Denies chest pain, shortness of breath,?cough, fever, headache,?dizziness, weakness, abdominal pain, nausea,?vomiting, diarrhea, constipation, dysuria, depression, anxiety.  ?  ?   Thyroid US 6/20/19 Multinodular thyroid without significant interval change in the size  of the nodules. There has been overall increase in the volume of the  left lobe of the thyroid.  Review of Systems  Constitutional: negative except as stated in  HPI  Eye: negative except as stated in HPI  ENMT: negative except as stated in HPI  Respiratory: negative except as stated in HPI  Cardiovascular: negative except as stated in HPI  Gastrointestinal: negative except as stated in HPI  Genitourinary: negative except as stated in HPI  Hema/Lymph: negative except as stated in HPI  Endocrine: negative except as stated in HPI  Immunologic: negative except as stated in HPI  Musculoskeletal: negative except as stated in HPI  Integumentary: negative except as stated in HPI  Neurologic: negative except as stated in HPI  ?   All Other ROS_ ?negative except as stated in HPI  Physical Exam  Vitals & Measurements  T:?37.1? ?C (Oral)? HR:?73(Peripheral)? RR:?20? BP:?100/64?  HT:?158?cm? WT:?72.9?kg? BMI:?29.2?  General: Alert and oriented, No acute distress.  Eye: Pupils are equal, round and reactive to light, Extraocular movements are intact, Normal conjunctiva.  HENT: Normocephalic, Normal hearing, Oral mucosa is moist,?mild pharyngeal erythema.+ Very ttp over rachel maxillary sinuses  Neck: Supple, Non-tender, No lymphadenopathy, No thyromegaly.  Respiratory: slight inspiratory and expiratory wheezes noted on auscultation throughout lung fields, Respirations are non-labored, Breath sounds are equal, Symmetrical chest wall expansion.  Cardiovascular: Normal rate, Regular rhythm, No murmur, Normal peripheral perfusion, No edema.  Gastrointestinal: Soft, Non-tender, Non-distended, Normal bowel sounds, No organomegaly.  Genitourinary: No costovertebral angle tenderness, No inguinal tenderness.  Lymphatics: No lymphadenopathy neck, axilla, groin.  Musculoskeletal: Normal range of motion, Normal strength, No tenderness, Normal gait.  Neurologic: No focal deficits, Cranial Nerves II-XII are grossly intact.  Integumentary: Warm, Dry, Intact.+ left dry scaly rash to left elbow  Feet:?? 2+ pedal pulses bilaterally.  ?  Assessment/Plan  BMI 29.0-29.9,adult?Z68.29  ?Low fat diet and 150  minutes?of aerobic exercise per week  Ordered:  1160F- Medication reconciliation completed during visit, HTN (hypertension)  CKD (chronic kidney disease) stage 3, GFR 30-59 ml/min  HLD (hyperlipidemia)  T2DM (type 2 diabetes mellitus)  Tobacco abuse  COPD (chronic obstructive pulmonary disease)  Sinusitis  BMI 29.0-29.9,adult, [facility] Int Med C, 12/12...  Clinic Follow up, *Est. 04/12/20 3:00:00 CDT, Order for future visit, HTN (hypertension)  CKD (chronic kidney disease) stage 3, GFR 30-59 ml/min  HLD (hyperlipidemia)  T2DM (type 2 diabetes mellitus)  Tobacco abuse  COPD (chronic obstructive pulmonary disease)  S...  Office/Outpatient Visit Level 4 Established 48598 PC, HTN (hypertension)  CKD (chronic kidney disease) stage 3, GFR 30-59 ml/min  HLD (hyperlipidemia)  T2DM (type 2 diabetes mellitus)  Tobacco abuse  COPD (chronic obstructive pulmonary disease)  Sinusitis  BMI 29.0-29.9,adult, [facility] Int [facility] C, 12/12...  ?  CKD (chronic kidney disease) stage 3, GFR 30-59 ml/min?N18.3  ?GFR 66, stable for pt. Renoprotective measures discussed:  ?   -Comply with renal diet (reduce intake of milk and cheese,??dried beans,?peas,?sylvie,?nuts?and peanut butter)??and low sodium diet (2 grams a day)  -Control diabetes (goal A1C <7.0%)  -Control high blood pressure (?goal BP < 130/80, please record BP at home every day and bring log to next office visit)  -Exercise at least 30 minutes a day, 5 days a week.  -Maintain healthy weight.  -Decrease or stop alcohol use  -Do not smoke  -Stay well hydrated  -Receive Pneumovax, Flu, and HBV vaccines if indicated.  -Do not take NSAIDs (Ibuprofen, Naproxen, Aleve, Advil, Toradol, Mobic), may take only Tylenol as needed for pain/headaches.  -Take cholesterol-lowering medications as prescribed (LDL goal <100)  Ordered:  1160F- Medication reconciliation completed during visit, HTN (hypertension)  CKD (chronic kidney disease) stage 3, GFR 30-59 ml/min  HLD (hyperlipidemia)   T2DM (type 2 diabetes mellitus)  Tobacco abuse  COPD (chronic obstructive pulmonary disease)  Sinusitis  BMI 29.0-29.9,adult, [facility] Int Med C, 12/12...  Clinic Follow up, *Est. 04/12/20 3:00:00 CDT, Order for future visit, HTN (hypertension)  CKD (chronic kidney disease) stage 3, GFR 30-59 ml/min  HLD (hyperlipidemia)  T2DM (type 2 diabetes mellitus)  Tobacco abuse  COPD (chronic obstructive pulmonary disease)  S...  Office/Outpatient Visit Level 4 Established 82201 PC, HTN (hypertension)  CKD (chronic kidney disease) stage 3, GFR 30-59 ml/min  HLD (hyperlipidemia)  T2DM (type 2 diabetes mellitus)  Tobacco abuse  COPD (chronic obstructive pulmonary disease)  Sinusitis  BMI 29.0-29.9,adult, [facility] Int [facility], 12/12...  ?  COPD (chronic obstructive pulmonary disease)?J44.9  Adding Incruse (LAMA), Pt is only using Symbicort (LABA/ICS) once a day and was informed of the correct usage which is BID. Continue Nebs every 4-6 hrs prn  Repeat CXR today  Advised smoking cessation; pt continues to smoke 1/2ppd  ED precautions for acute SOB, CP, palpitations or fever  Ordered:  1160F- Medication reconciliation completed during visit, HTN (hypertension)  CKD (chronic kidney disease) stage 3, GFR 30-59 ml/min  HLD (hyperlipidemia)  T2DM (type 2 diabetes mellitus)  Tobacco abuse  COPD (chronic obstructive pulmonary disease)  Sinusitis  BMI 29.0-29.9,adult, [facility] Int Med C, 12/12...  Clinic Follow up, *Est. 04/12/20 3:00:00 CDT, Order for future visit, HTN (hypertension)  CKD (chronic kidney disease) stage 3, GFR 30-59 ml/min  HLD (hyperlipidemia)  T2DM (type 2 diabetes mellitus)  Tobacco abuse  COPD (chronic obstructive pulmonary disease)  S...  Office/Outpatient Visit Level 4 Established 08441 PC, HTN (hypertension)  CKD (chronic kidney disease) stage 3, GFR 30-59 ml/min  HLD (hyperlipidemia)  T2DM (type 2 diabetes mellitus)  Tobacco abuse  COPD (chronic obstructive pulmonary disease)  Sinusitis  BMI  29.0-29.9,adult, [facility] Int Med C, 12/12...  XR Chest 2 Views, Routine, *Est. 12/12/19 3:00:00 CST, COPD, None, Ambulatory, Rad Type, Order for future visit, COPD (chronic obstructive pulmonary disease)  Sinusitis, Not Scheduled, *Est. 12/12/19 3:00:00 CST  ?  Eczema?L30.9  ?triamcinolone with Aquaphor otc  ?  HLD (hyperlipidemia)?E78.5  LDL at goal; 54. Trig 183H. Continue pravastatin 40 at bedtime, adding otc coq10. Low fat diet; more whole grain products, fruits, vegetables, lean meats, fish, and poultry.  Ordered:  1160F- Medication reconciliation completed during visit, HTN (hypertension)  CKD (chronic kidney disease) stage 3, GFR 30-59 ml/min  HLD (hyperlipidemia)  T2DM (type 2 diabetes mellitus)  Tobacco abuse  COPD (chronic obstructive pulmonary disease)  Sinusitis  BMI 29.0-29.9,adult, [facility] Int Med C, 12/12...  Clinic Follow up, *Est. 04/12/20 3:00:00 CDT, Order for future visit, HTN (hypertension)  CKD (chronic kidney disease) stage 3, GFR 30-59 ml/min  HLD (hyperlipidemia)  T2DM (type 2 diabetes mellitus)  Tobacco abuse  COPD (chronic obstructive pulmonary disease)  S...  Office/Outpatient Visit Level 4 Established 22120 PC, HTN (hypertension)  CKD (chronic kidney disease) stage 3, GFR 30-59 ml/min  HLD (hyperlipidemia)  T2DM (type 2 diabetes mellitus)  Tobacco abuse  COPD (chronic obstructive pulmonary disease)  Sinusitis  BMI 29.0-29.9,adult, [facility] Int [facility] C, 12/12...  ?  HTN (hypertension)?I10  ?BP and HR stable today. Continue Cardizem and metoprolol. EKG NSR in ED. Keep Cardiology apt this month. DASH diet: Eat more fruits, vegetables, and low fat dairy foods. Low sodium diet.  Ordered:  1160F- Medication reconciliation completed during visit, HTN (hypertension)  CKD (chronic kidney disease) stage 3, GFR 30-59 ml/min  HLD (hyperlipidemia)  T2DM (type 2 diabetes mellitus)  Tobacco abuse  COPD (chronic obstructive pulmonary disease)  Sinusitis  BMI 29.0-29.9,adult, [facility] Int Med C,  12/12...  Clinic Follow up, *Est. 04/12/20 3:00:00 CDT, Order for future visit, HTN (hypertension)  CKD (chronic kidney disease) stage 3, GFR 30-59 ml/min  HLD (hyperlipidemia)  T2DM (type 2 diabetes mellitus)  Tobacco abuse  COPD (chronic obstructive pulmonary disease)  S...  Office/Outpatient Visit Level 4 Established 80384 PC, HTN (hypertension)  CKD (chronic kidney disease) stage 3, GFR 30-59 ml/min  HLD (hyperlipidemia)  T2DM (type 2 diabetes mellitus)  Tobacco abuse  COPD (chronic obstructive pulmonary disease)  Sinusitis  BMI 29.0-29.9,adult, [facility] Int [facility] C, 12/12...  ?  Sinusitis?J32.9  Start on DOXY 100 bid x10 days.  Continue loratadine.  Fluids/rest  Ordered:  1160F- Medication reconciliation completed during visit, HTN (hypertension)  CKD (chronic kidney disease) stage 3, GFR 30-59 ml/min  HLD (hyperlipidemia)  T2DM (type 2 diabetes mellitus)  Tobacco abuse  COPD (chronic obstructive pulmonary disease)  Sinusitis  BMI 29.0-29.9,adult, [facility] Int [facility], 12/12...  Clinic Follow up, *Est. 04/12/20 3:00:00 CDT, Order for future visit, HTN (hypertension)  CKD (chronic kidney disease) stage 3, GFR 30-59 ml/min  HLD (hyperlipidemia)  T2DM (type 2 diabetes mellitus)  Tobacco abuse  COPD (chronic obstructive pulmonary disease)  S...  Office/Outpatient Visit Level 4 Established 71306 , HTN (hypertension)  CKD (chronic kidney disease) stage 3, GFR 30-59 ml/min  HLD (hyperlipidemia)  T2DM (type 2 diabetes mellitus)  Tobacco abuse  COPD (chronic obstructive pulmonary disease)  Sinusitis  BMI 29.0-29.9,adult, [facility], 12/12...  XR Chest 2 Views, Routine, *Est. 12/12/19 3:00:00 CST, COPD, None, Ambulatory, Rad Type, Order for future visit, COPD (chronic obstructive pulmonary disease)  Sinusitis, Not Scheduled, *Est. 12/12/19 3:00:00 CST  ?  T2DM (type 2 diabetes mellitus)?E11.9  ?A1c at goal; 6.4. Continue metformin 500 daily.  ADA diet and strict glucose monitoring  advised.  Ordered:  1160F- Medication reconciliation completed during visit, HTN (hypertension)  CKD (chronic kidney disease) stage 3, GFR 30-59 ml/min  HLD (hyperlipidemia)  T2DM (type 2 diabetes mellitus)  Tobacco abuse  COPD (chronic obstructive pulmonary disease)  Sinusitis  BMI 29.0-29.9,adult, [facility] Int Med C, 12/12...  Clinic Follow up, *Est. 04/12/20 3:00:00 CDT, Order for future visit, HTN (hypertension)  CKD (chronic kidney disease) stage 3, GFR 30-59 ml/min  HLD (hyperlipidemia)  T2DM (type 2 diabetes mellitus)  Tobacco abuse  COPD (chronic obstructive pulmonary disease)  S...  Office/Outpatient Visit Level 4 Established 20133 PC, HTN (hypertension)  CKD (chronic kidney disease) stage 3, GFR 30-59 ml/min  HLD (hyperlipidemia)  T2DM (type 2 diabetes mellitus)  Tobacco abuse  COPD (chronic obstructive pulmonary disease)  Sinusitis  BMI 29.0-29.9,adult, [facility] Int Med C, 12/12...  ?  Tobacco abuse?Z72.0  ?Advised smoking cessation; pt continues to smoke 1/2ppd  Ordered:  1160F- Medication reconciliation completed during visit, HTN (hypertension)  CKD (chronic kidney disease) stage 3, GFR 30-59 ml/min  HLD (hyperlipidemia)  T2DM (type 2 diabetes mellitus)  Tobacco abuse  COPD (chronic obstructive pulmonary disease)  Sinusitis  BMI 29.0-29.9,adult, [facility] Int Med C, 12/12...  Clinic Follow up, *Est. 04/12/20 3:00:00 CDT, Order for future visit, HTN (hypertension)  CKD (chronic kidney disease) stage 3, GFR 30-59 ml/min  HLD (hyperlipidemia)  T2DM (type 2 diabetes mellitus)  Tobacco abuse  COPD (chronic obstructive pulmonary disease)  S...  Office/Outpatient Visit Level 4 Established 22322 PC, HTN (hypertension)  CKD (chronic kidney disease) stage 3, GFR 30-59 ml/min  HLD (hyperlipidemia)  T2DM (type 2 diabetes mellitus)  Tobacco abuse  COPD (chronic obstructive pulmonary disease)  Sinusitis  BMI 29.0-29.9,adult, [facility] Int [facility] C, 12/12...  ?  Orders:  albuterol, 1 puff(s), INH,  q4hr, PRN PRN shortness of breath or wheezing, # 1 EA, 2 Refill(s), Pharmacy: [id number]  albuterol, 2.5 mg = 3 mL, NEB, q6hr, PRN PRN as needed for wheezing, # 90 mL, 3 Refill(s), Pharmacy: [facility]  budesonide-formoterol, 2 puff(s), INH, BID, # 1 EA, 6 Refill(s), Pharmacy: [facility]  doxycycline, 100 mg = 1 tab(s), Oral, BID, X 10 day(s), # 20 tab(s), 0 Refill(s), Pharmacy: [facility]  metFORMIN, 500 mg = 1 tab(s), Oral, Daily, with meals, # 90 tab(s), 2 Refill(s), Pharmacy: [facility]  [facility] Prescription, Glucometer test strips and lancets, See Instructions, Use daily to check glucose E11.9, # 100 EA, 6 Refill(s), Pharmacy: [facility]  pravastatin, 40 mg = 1 tab(s), Oral, Daily, # 90 tab(s), 1 Refill(s), Pharmacy: [facility]  triamcinolone topical, 1 ramon, TOP, BID, PRN PRN arm rash, # 30 gm, 1 Refill(s), Pharmacy: [facility]  umeclidinium, 1 EA, INH, q24hr, doses should be taken at least 24 hours apart, # 30 EA, 3 Refill(s), Pharmacy: [facility]  Labs thoroughly reviewed with patient. Medication refills addressed today.  RTC prn and 3-4 months, with labs 1 week prior to the apt.  Patient voices understanding of all discharge instructions.?  Referrals  Clinic Follow up, *Est. 04/12/20 3:00:00 CDT, Order for future visit, HTN (hypertension)  CKD (chronic kidney disease) stage 3, GFR 30-59 ml/min  HLD (hyperlipidemia)  T2DM (type 2 diabetes mellitus)  Tobacco abuse  COPD (chronic obstructive pulmonary disease)  S...   Problem List/Past Medical History  Ongoing  Arthritis(  Confirmed  )  COPD (chronic obstructive pulmonary disease)(  Confirmed  )  HTN (hypertension)  IGT (impaired glucose tolerance)  Osteoporosis(  Confirmed  )  SOB (shortness of breath)(  Confirmed  )  SVT (supraventricular tachycardia)  Thyroid nodule  Tobacco abuse  Historical  CKD (chronic kidney disease) stage 3, GFR 30-59  ml/min  SVT (supraventricular tachycardia)  Procedure/Surgical History  Vaginal hysterectomy (1986)  Tonsillectomy and adenoidectomy (1964)  partial hysterectomy  Tonsillectomy and adenoidectomy; age 13 or over   Medications  albuterol 0.083% inhalation solution, 2.5 mg= 3 mL, NEB, q6hr, PRN, 3 refills  aspirin 81 mg oral tablet, CHEWABLE, 81 mg= 1 tab(s), Oral, Daily, 3 refills  budesonide-formoterol 160 mcg-4.5 mcg/inh inhalation aerosol, 2 puff(s), INH, BID, 6 refills  calcium (as carbonate)-vitamin D 500 mg-125 intl units oral tablet, 1 tab(s), Oral, Daily  diltiazem 90 mg oral TABlet (immed. release), 90 mg= 1 tab(s), Oral, TID, 3 refills  DuoNeb 0.5 mg-2.5 mg/3 mL inhalation solution, 3 mL, INH, QID,? ?Unable to obtain  DuoNeb 0.5 mg-2.5 mg/3 mL inhalation solution, 3 mL, INH, QID, PRN, 1 refills,? ?Unable to obtain  Glucometer, See Instructions  Glucometer test strips and lancets, See Instructions, 6 refills  Levaquin 500 mg oral tablet, 500 mg= 1 tab(s), Oral, q24hr,? ?Not Taking, Completed Rx  loratadine 10 mg oral tablet, 10 mg= 1 tab(s), Oral, Daily  metformin 500 mg oral tablet, 500 mg= 1 tab(s), Oral, Daily, 6 refills  methylPREDNISolone 4 mg oral tab,? ?Not Taking, Completed Rx  Metoprolol Tartrate 25 mg oral tablet, 25 mg= 1 tab(s), Oral, BID, 3 refills  Nebulizer Machine, See Instructions  Pravastatin 40 mg Oral Tab, 40 mg= 1 tab(s), Oral, Daily, 1 refills  Ventolin HFA 90 mcg/inh inhalation aerosol, 1 puff(s), INH, q4hr, PRN, 2 refills  Allergies  naproxen?(Hives)  zinc sulfate ophthalmic?(LATE EFFECT OF BURNS OF OTHER SPECIFIED SITES)  Social History  Abuse/Neglect  No, 12/06/2019  No, 12/01/2019  Alcohol - Denies Alcohol Use, 10/12/2014  Never, 07/17/2017  Employment/School  Unemployed, 01/11/2016  Exercise  Exercise type: does not excercise., 01/11/2016  Home/Environment  Lives with Significant other. Alcohol abuse in household: No. Substance abuse in household: No. Smoker in household: Yes.  Feels unsafe at home: No. Safe place to go: Yes. Family/Friends available for support: Yes., 01/11/2016  Nutrition/Health  Regular, 01/11/2016  Sexual  Gender Identity Identifies as female., 04/17/2019  Substance Use - Denies Substance Abuse, 12/23/2014  Never, 11/14/2017  Tobacco  10 or more cigarettes (1/2 pack or more)/day in last 30 days, Cigarettes, No, 12/06/2019  10 or more cigarettes (1/2 pack or more)/day in last 30 days, No, 12/01/2019  Family History  Metastatic cancer: Father.  Primary malignant neoplasm of colon: Mother.  Immunizations  Vaccine Date Status Comments   influenza virus vaccine, inactivated 11/15/2018 Given    tetanus/diphtheria/pertussis, acel(Tdap) 07/16/2018 Given    influenza virus vaccine, inactivated 11/14/2017 Given LOT [id number] EXP. 04- GIVEN INFLUENZA 0.5ML IM INJECTION RT. DELOID. TOLERATED WELL.   pneumococcal 23-polyvalent vaccine 07/17/2017 Given    influenza virus vaccine, inactivated 01/23/2017 Given    influenza virus vaccine, inactivated 01/11/2016 Given    influenza virus vaccine, inactivated 10/13/2010 Recorded    hepatitis A adult vaccine 09/08/2005 Recorded    tetanus-diphtheria toxoids 09/08/2005 Recorded    Health Maintenance  Health Maintenance  ???Pending?(in the next year)  ??? ??OverDue  ??? ? ? ?COPD Maintenance-Spirometry due??and every?  ??? ? ? ?Pneumococcal Vaccine due??and every?  ??? ? ? ?Smoking Cessation (Diabetes) due??11/14/19??and every 2??year(s)  ??? ??Due?  ??? ? ? ?Colorectal Screening ([facility]) due??11/14/19??and every 1??year(s)  ??? ? ? ?Lung Cancer Screening due??12/12/19??and every 1??year(s)  ??? ? ? ?Zoster Vaccine due??12/12/19??and every 100??year(s)  ??? ??Refused?  ??? ? ? ?Smoking Cessation due??01/01/19??and every 1??year(s)  ??? ? ? ?Advance Directive due??01/01/20??and every 1??year(s)  ??? ??Due In Future?  ??? ? ? ?Alcohol Misuse Screening not due until??01/01/20??and every 1??year(s)  ??? ? ? ?Cognitive Screening not  due until??01/01/20??and every 1??year(s)  ??? ? ? ?Fall Risk Assessment not due until??01/01/20??and every 1??year(s)  ??? ? ? ?Functional Assessment not due until??01/01/20??and every 1??year(s)  ??? ? ? ?Geriatric Depression Screening not due until??01/01/20??and every 1??year(s)  ??? ? ? ?Obesity Screening not due until??01/01/20??and every 1??year(s)  ??? ? ? ?Aspirin Therapy for CVD Prevention not due until??04/17/20??and every 1??year(s)  ??? ? ? ?Diabetes Maintenance-Foot Exam not due until??09/23/20??and every 1??year(s)  ??? ? ? ?ADL Screening not due until??09/24/20??and every 1??year(s)  ??? ? ? ?Diabetes Maintenance-Eye Exam not due until??09/25/20??and every 1??year(s)  ??? ? ? ?Diabetes Maintenance-HgbA1c not due until??12/10/20??and every 1??year(s)  ??? ? ? ?Diabetes Maintenance-Fasting Lipid Profile not due until??12/10/20??and every 1??year(s)  ???Satisfied?(in the past 1 year)  ??? ??Satisfied?  ??? ? ? ?ADL Screening on??09/24/19.??Satisfied by Abiola Shrestha LPN.  ??? ? ? ?Advance Directive on??05/15/19.??Satisfied by Savannah Gonzalez  ??? ? ? ?Alcohol Misuse Screening on??09/24/19.??Satisfied by Harika Dye  ??? ? ? ?Aspirin Therapy for CVD Prevention on??04/17/19.??Satisfied by Alma Warner  ??? ? ? ?Blood Pressure Screening on??12/06/19.??Satisfied by Thanh Owen  ??? ? ? ?Body Mass Index Check on??12/06/19.??Satisfied by Parth Moya RN, Yogesh  ??? ? ? ?Breast Cancer Screening ([facility]) on??10/30/19.??Satisfied by Jeanine Castro  ??? ? ? ?Cognitive Screening on??05/15/19.??Satisfied by Savannah Gonzalez  ??? ? ? ?Depression Screening on??09/24/19.??Satisfied by Abiola Shrestha LPN.  ??? ? ? ?Diabetes Maintenance-HgbA1c on??12/11/19.??Satisfied by Zelda Solano  ??? ? ? ?Diabetes Screening on??12/11/19.??Satisfied by Zelda Solano  ??? ? ? ?Fall Risk Assessment on??12/01/19.??Satisfied by Sabine GAXIOLA, Christy  ??? ? ? ?Functional Assessment  on??05/15/19.??Satisfied by Savannah Gonzalez  ??? ? ? ?Geriatric Depression Screening on??01/17/19.??Satisfied by Rhina Oliveira LPN  ??? ? ? ?Hypertension Management-BMP on??12/11/19.??Satisfied by Zelda Solano  ??? ? ? ?Influenza Vaccine on??01/07/19.  ??? ? ? ?Lipid Screening on??12/11/19.??Satisfied by Zelda Solano  ??? ? ? ?Obesity Screening on??12/06/19.??Satisfied by Parth Moya RN, Yogesh  ??? ??Refused?  ??? ? ? ?Advance Directive on??05/15/19.??Recorded by Savannah Gonzalez??Reason: Patient Refuses  ??? ? ? ?Smoking Cessation on??05/15/19.??Recorded by Savannah Gonzalez  ?

## 2022-05-09 DIAGNOSIS — N18.9 CHRONIC KIDNEY DISEASE, UNSPECIFIED CKD STAGE: ICD-10-CM

## 2022-05-09 DIAGNOSIS — I10 HYPERTENSION, UNSPECIFIED TYPE: Primary | ICD-10-CM

## 2022-06-25 ENCOUNTER — HOSPITAL ENCOUNTER (EMERGENCY)
Facility: HOSPITAL | Age: 72
Discharge: HOME | End: 2022-06-25
Attending: INTERNAL MEDICINE
Payer: COMMERCIAL

## 2022-06-25 VITALS
BODY MASS INDEX: 28.12 KG/M2 | OXYGEN SATURATION: 93 % | TEMPERATURE: 98 F | SYSTOLIC BLOOD PRESSURE: 137 MMHG | DIASTOLIC BLOOD PRESSURE: 66 MMHG | WEIGHT: 154.75 LBS | RESPIRATION RATE: 24 BRPM | HEART RATE: 69 BPM

## 2022-06-25 DIAGNOSIS — M77.9 INFLAMMATION AROUND JOINT: ICD-10-CM

## 2022-06-25 DIAGNOSIS — M62.838 NECK MUSCLE SPASM: Primary | ICD-10-CM

## 2022-06-25 PROCEDURE — 96372 THER/PROPH/DIAG INJ SC/IM: CPT | Performed by: INTERNAL MEDICINE

## 2022-06-25 PROCEDURE — 99284 EMERGENCY DEPT VISIT MOD MDM: CPT | Mod: 25

## 2022-06-25 PROCEDURE — 63600175 PHARM REV CODE 636 W HCPCS: Performed by: INTERNAL MEDICINE

## 2022-06-25 PROCEDURE — 25000003 PHARM REV CODE 250: Performed by: INTERNAL MEDICINE

## 2022-06-25 RX ORDER — METHOCARBAMOL 500 MG/1
1000 TABLET, FILM COATED ORAL 2 TIMES DAILY PRN
Qty: 20 TABLET | Refills: 0 | Status: SHIPPED | OUTPATIENT
Start: 2022-06-25 | End: 2022-06-30

## 2022-06-25 RX ORDER — HYDROCODONE BITARTRATE AND ACETAMINOPHEN 5; 325 MG/1; MG/1
1 TABLET ORAL EVERY 8 HOURS PRN
Qty: 10 TABLET | Refills: 0 | Status: SHIPPED | OUTPATIENT
Start: 2022-06-25 | End: 2022-07-11

## 2022-06-25 RX ORDER — KETOROLAC TROMETHAMINE 30 MG/ML
30 INJECTION, SOLUTION INTRAMUSCULAR; INTRAVENOUS
Status: COMPLETED | OUTPATIENT
Start: 2022-06-25 | End: 2022-06-25

## 2022-06-25 RX ORDER — METHOCARBAMOL 500 MG/1
500 TABLET, FILM COATED ORAL
Status: COMPLETED | OUTPATIENT
Start: 2022-06-25 | End: 2022-06-25

## 2022-06-25 RX ADMIN — KETOROLAC TROMETHAMINE 30 MG: 30 INJECTION, SOLUTION INTRAMUSCULAR at 08:06

## 2022-06-25 RX ADMIN — METHOCARBAMOL 500 MG: 500 TABLET ORAL at 08:06

## 2022-06-25 NOTE — ED PROVIDER NOTES
Encounter Date: 6/25/2022       History     Chief Complaint   Patient presents with    Neck Pain     C/o pain from right side of neck down towards shoulder x 3 days. Denies any injury     Right shoulder pain for the last 2 days. Pain is constant, radiates to her neck, worse with movement and palpation. Used aspiring and advil, also cold compress without improvement. Denies injury, rash, fever.    The history is provided by the patient.     Review of patient's allergies indicates:   Allergen Reactions    Naproxen Hives    Zinc sulfate      Other reaction(s): LATE EFFECT OF BURNS OF OTHER SPECIFIED SITES   **Pt states she takes advil, aspirin, Pepto bismol, without problems, think she had toradol in the past w/o problems. Accepts trying toradol IM for her pain today, understand it could cause her an allergic reaction too.  History reviewed. No pertinent past medical history.  History reviewed. No pertinent surgical history.  History reviewed. No pertinent family history.  Social History     Tobacco Use    Smoking status: Never Smoker    Smokeless tobacco: Never Used   Substance Use Topics    Drug use: Never     Review of Systems   Constitutional: Negative for fever.   HENT: Negative for sore throat.    Respiratory: Positive for wheezing (States Hx of COPD, took nebs PTA). Negative for shortness of breath.    Cardiovascular: Negative for chest pain.   Gastrointestinal: Negative for nausea.   Genitourinary: Negative for dysuria.   Musculoskeletal: Positive for arthralgias and back pain.   Skin: Negative for rash.   Neurological: Negative for weakness.   Hematological: Does not bruise/bleed easily.   All other systems reviewed and are negative.      Physical Exam     Initial Vitals [06/25/22 0825]   BP Pulse Resp Temp SpO2   137/66 69 (!) 24 98.1 °F (36.7 °C) (!) 93 %      MAP       --         Physical Exam    Nursing note and vitals reviewed.  Constitutional: She appears well-developed and well-nourished.   HENT:    Head: Normocephalic and atraumatic.   Mouth/Throat: Oropharynx is clear and moist.   Eyes: Pupils are equal, round, and reactive to light.   Neck: Neck supple.   Normal range of motion.  Cardiovascular: Normal rate, regular rhythm, normal heart sounds and intact distal pulses.   Pulmonary/Chest: She has wheezes (Mild, expiratory).   Abdominal: Abdomen is soft. Bowel sounds are normal. She exhibits no distension. There is no abdominal tenderness. There is no rebound and no guarding.   Musculoskeletal:         General: Tenderness (Rt shoulder among sternocleidomastoid area) present. No edema. Normal range of motion.      Cervical back: Normal range of motion and neck supple.     Neurological: She is alert and oriented to person, place, and time. She has normal strength.   Skin: Skin is warm and dry. No rash noted.   Psychiatric: Her behavior is normal.         ED Course   Procedures  Labs Reviewed - No data to display       Imaging Results          X-Ray Shoulder Complete 2 View Right (Final result)  Result time 06/25/22 09:48:21    Final result by Frandy Rodriguez MD (06/25/22 09:48:21)                 Impression:      No acute findings.      Electronically signed by: Frandy Rodriguez  Date:    06/25/2022  Time:    09:48             Narrative:    EXAMINATION:  XR SHOULDER COMPLETE 2 OR MORE VIEWS RIGHT    CLINICAL HISTORY:  Enthesopathy, unspecified    COMPARISON:  None    FINDINGS:  Three views right shoulder.  There is no fracture or dislocation.  There are mild degenerative changes.                                 Medications   ketorolac injection 30 mg (30 mg Intramuscular Given 6/25/22 0844)   methocarbamoL tablet 500 mg (500 mg Oral Given 6/25/22 0844)                          Clinical Impression:   Final diagnoses:  [M77.9] Inflammation around joint  [M62.838] Neck muscle spasm (Primary)          ED Disposition Condition    Discharge Stable        ED Prescriptions     Medication Sig Dispense Start Date End Date  Auth. Provider    methocarbamoL (ROBAXIN) 500 MG Tab Take 2 tablets (1,000 mg total) by mouth 2 (two) times daily as needed (Pain). 20 tablet 6/25/2022 6/30/2022 Randy Comer MD    HYDROcodone-acetaminophen (NORCO) 5-325 mg per tablet Take 1 tablet by mouth every 8 (eight) hours as needed for Pain. 10 tablet 6/25/2022  Randy Comer MD        Follow-up Information    None          Randy Comer MD  06/25/22 1001

## 2022-07-06 ENCOUNTER — LAB VISIT (OUTPATIENT)
Dept: LAB | Facility: HOSPITAL | Age: 72
End: 2022-07-06
Attending: NURSE PRACTITIONER
Payer: COMMERCIAL

## 2022-07-06 DIAGNOSIS — E11.9 T2DM (TYPE 2 DIABETES MELLITUS): ICD-10-CM

## 2022-07-06 DIAGNOSIS — E04.2 MULTINODULAR THYROID: ICD-10-CM

## 2022-07-06 DIAGNOSIS — N18.9 CHRONIC KIDNEY DISEASE, UNSPECIFIED CKD STAGE: ICD-10-CM

## 2022-07-06 DIAGNOSIS — E55.9 VITAMIN D DEFICIENCY: ICD-10-CM

## 2022-07-06 DIAGNOSIS — I10 HYPERTENSION, UNSPECIFIED TYPE: ICD-10-CM

## 2022-07-06 DIAGNOSIS — N18.9 CKD (CHRONIC KIDNEY DISEASE): ICD-10-CM

## 2022-07-06 DIAGNOSIS — E78.5 HYPERLIPIDEMIA, UNSPECIFIED HYPERLIPIDEMIA TYPE: Primary | ICD-10-CM

## 2022-07-06 LAB
ALBUMIN SERPL-MCNC: 3.5 GM/DL (ref 3.4–4.8)
ALBUMIN/GLOB SERPL: 0.9 RATIO (ref 1.1–2)
ALP SERPL-CCNC: 98 UNIT/L (ref 40–150)
ALT SERPL-CCNC: 21 UNIT/L (ref 0–55)
AST SERPL-CCNC: 16 UNIT/L (ref 5–34)
BASOPHILS # BLD AUTO: 0.06 X10(3)/MCL (ref 0–0.2)
BASOPHILS NFR BLD AUTO: 0.7 %
BILIRUBIN DIRECT+TOT PNL SERPL-MCNC: 0.4 MG/DL
BUN SERPL-MCNC: 8.1 MG/DL (ref 9.8–20.1)
CALCIUM SERPL-MCNC: 9.4 MG/DL (ref 8.4–10.2)
CHLORIDE SERPL-SCNC: 101 MMOL/L (ref 98–107)
CHOLEST SERPL-MCNC: 140 MG/DL
CHOLEST/HDLC SERPL: 4 {RATIO} (ref 0–5)
CO2 SERPL-SCNC: 29 MMOL/L (ref 23–31)
CREAT SERPL-MCNC: 0.82 MG/DL (ref 0.55–1.02)
DEPRECATED CALCIDIOL+CALCIFEROL SERPL-MC: 36.1 NG/ML (ref 30–80)
EOSINOPHIL # BLD AUTO: 0.27 X10(3)/MCL (ref 0–0.9)
EOSINOPHIL NFR BLD AUTO: 3.3 %
ERYTHROCYTE [DISTWIDTH] IN BLOOD BY AUTOMATED COUNT: 13.4 % (ref 11.5–17)
EST. AVERAGE GLUCOSE BLD GHB EST-MCNC: 119.8 MG/DL
GLOBULIN SER-MCNC: 3.8 GM/DL (ref 2.4–3.5)
GLUCOSE SERPL-MCNC: 109 MG/DL (ref 82–115)
HBA1C MFR BLD: 5.8 %
HCT VFR BLD AUTO: 41.8 % (ref 37–47)
HDLC SERPL-MCNC: 38 MG/DL (ref 35–60)
HGB BLD-MCNC: 13.3 GM/DL (ref 12–16)
IMM GRANULOCYTES # BLD AUTO: 0.01 X10(3)/MCL (ref 0–0.04)
IMM GRANULOCYTES NFR BLD AUTO: 0.1 %
LDLC SERPL CALC-MCNC: 69 MG/DL (ref 50–140)
LYMPHOCYTES # BLD AUTO: 2.69 X10(3)/MCL (ref 0.6–4.6)
LYMPHOCYTES NFR BLD AUTO: 33 %
MCH RBC QN AUTO: 29.7 PG (ref 27–31)
MCHC RBC AUTO-ENTMCNC: 31.8 MG/DL (ref 33–36)
MCV RBC AUTO: 93.3 FL (ref 80–94)
MONOCYTES # BLD AUTO: 0.56 X10(3)/MCL (ref 0.1–1.3)
MONOCYTES NFR BLD AUTO: 6.9 %
NEUTROPHILS # BLD AUTO: 4.6 X10(3)/MCL (ref 2.1–9.2)
NEUTROPHILS NFR BLD AUTO: 56 %
NRBC BLD AUTO-RTO: 0 %
PLATELET # BLD AUTO: 272 X10(3)/MCL (ref 130–400)
PMV BLD AUTO: 9.1 FL (ref 7.4–10.4)
POTASSIUM SERPL-SCNC: 4.5 MMOL/L (ref 3.5–5.1)
PROT SERPL-MCNC: 7.3 GM/DL (ref 5.8–7.6)
RBC # BLD AUTO: 4.48 X10(6)/MCL (ref 4.2–5.4)
SODIUM SERPL-SCNC: 137 MMOL/L (ref 136–145)
T4 FREE SERPL-MCNC: 0.83 NG/DL (ref 0.7–1.48)
TRIGL SERPL-MCNC: 167 MG/DL (ref 37–140)
TSH SERPL-ACNC: 0.83 UIU/ML (ref 0.35–4.94)
VLDLC SERPL CALC-MCNC: 33 MG/DL
WBC # SPEC AUTO: 8.2 X10(3)/MCL (ref 4.5–11.5)

## 2022-07-06 PROCEDURE — 82306 VITAMIN D 25 HYDROXY: CPT

## 2022-07-06 PROCEDURE — 36415 COLL VENOUS BLD VENIPUNCTURE: CPT

## 2022-07-06 PROCEDURE — 84443 ASSAY THYROID STIM HORMONE: CPT

## 2022-07-06 PROCEDURE — 84439 ASSAY OF FREE THYROXINE: CPT

## 2022-07-06 PROCEDURE — 83036 HEMOGLOBIN GLYCOSYLATED A1C: CPT

## 2022-07-06 PROCEDURE — 80053 COMPREHEN METABOLIC PANEL: CPT

## 2022-07-06 PROCEDURE — 85025 COMPLETE CBC W/AUTO DIFF WBC: CPT

## 2022-07-06 PROCEDURE — 80061 LIPID PANEL: CPT

## 2022-07-11 ENCOUNTER — OFFICE VISIT (OUTPATIENT)
Dept: INTERNAL MEDICINE | Facility: CLINIC | Age: 72
End: 2022-07-11
Payer: COMMERCIAL

## 2022-07-11 ENCOUNTER — HOSPITAL ENCOUNTER (OUTPATIENT)
Dept: RADIOLOGY | Facility: HOSPITAL | Age: 72
Discharge: HOME | End: 2022-07-11
Attending: NURSE PRACTITIONER
Payer: COMMERCIAL

## 2022-07-11 VITALS
HEART RATE: 73 BPM | TEMPERATURE: 98 F | WEIGHT: 153.63 LBS | SYSTOLIC BLOOD PRESSURE: 98 MMHG | RESPIRATION RATE: 18 BRPM | BODY MASS INDEX: 28.27 KG/M2 | HEIGHT: 62 IN | DIASTOLIC BLOOD PRESSURE: 54 MMHG

## 2022-07-11 DIAGNOSIS — J44.9 CHRONIC OBSTRUCTIVE PULMONARY DISEASE, UNSPECIFIED COPD TYPE: ICD-10-CM

## 2022-07-11 DIAGNOSIS — M54.2 NECK PAIN: ICD-10-CM

## 2022-07-11 DIAGNOSIS — E78.5 HYPERLIPIDEMIA, UNSPECIFIED HYPERLIPIDEMIA TYPE: ICD-10-CM

## 2022-07-11 DIAGNOSIS — I10 HYPERTENSION, UNSPECIFIED TYPE: ICD-10-CM

## 2022-07-11 DIAGNOSIS — Z72.0 TOBACCO USE: ICD-10-CM

## 2022-07-11 DIAGNOSIS — E11.9 TYPE 2 DIABETES MELLITUS WITHOUT COMPLICATION, WITHOUT LONG-TERM CURRENT USE OF INSULIN: ICD-10-CM

## 2022-07-11 PROBLEM — N18.9 CHRONIC KIDNEY DISEASE: Status: ACTIVE | Noted: 2022-07-11

## 2022-07-11 PROBLEM — I47.10 SUPRAVENTRICULAR TACHYCARDIA: Status: ACTIVE | Noted: 2022-07-11

## 2022-07-11 PROCEDURE — 4010F ACE/ARB THERAPY RXD/TAKEN: CPT | Mod: CPTII,,, | Performed by: NURSE PRACTITIONER

## 2022-07-11 PROCEDURE — 3008F BODY MASS INDEX DOCD: CPT | Mod: CPTII,,, | Performed by: NURSE PRACTITIONER

## 2022-07-11 PROCEDURE — 71046 X-RAY EXAM CHEST 2 VIEWS: CPT | Mod: TC

## 2022-07-11 PROCEDURE — 1101F PT FALLS ASSESS-DOCD LE1/YR: CPT | Mod: CPTII,,, | Performed by: NURSE PRACTITIONER

## 2022-07-11 PROCEDURE — 99214 OFFICE O/P EST MOD 30 MIN: CPT | Mod: PBBFAC,25 | Performed by: NURSE PRACTITIONER

## 2022-07-11 PROCEDURE — 4010F PR ACE/ARB THEARPY RXD/TAKEN: ICD-10-PCS | Mod: CPTII,,, | Performed by: NURSE PRACTITIONER

## 2022-07-11 PROCEDURE — 1126F AMNT PAIN NOTED NONE PRSNT: CPT | Mod: CPTII,,, | Performed by: NURSE PRACTITIONER

## 2022-07-11 PROCEDURE — 1160F PR REVIEW ALL MEDS BY PRESCRIBER/CLIN PHARMACIST DOCUMENTED: ICD-10-PCS | Mod: CPTII,,, | Performed by: NURSE PRACTITIONER

## 2022-07-11 PROCEDURE — 99215 PR OFFICE/OUTPT VISIT, EST, LEVL V, 40-54 MIN: ICD-10-PCS | Mod: S$PBB,,, | Performed by: NURSE PRACTITIONER

## 2022-07-11 PROCEDURE — 3078F DIAST BP <80 MM HG: CPT | Mod: CPTII,,, | Performed by: NURSE PRACTITIONER

## 2022-07-11 PROCEDURE — 3288F FALL RISK ASSESSMENT DOCD: CPT | Mod: CPTII,,, | Performed by: NURSE PRACTITIONER

## 2022-07-11 PROCEDURE — 3008F PR BODY MASS INDEX (BMI) DOCUMENTED: ICD-10-PCS | Mod: CPTII,,, | Performed by: NURSE PRACTITIONER

## 2022-07-11 PROCEDURE — 3074F SYST BP LT 130 MM HG: CPT | Mod: CPTII,,, | Performed by: NURSE PRACTITIONER

## 2022-07-11 PROCEDURE — 1160F RVW MEDS BY RX/DR IN RCRD: CPT | Mod: CPTII,,, | Performed by: NURSE PRACTITIONER

## 2022-07-11 PROCEDURE — 72050 X-RAY EXAM NECK SPINE 4/5VWS: CPT | Mod: TC

## 2022-07-11 PROCEDURE — 1101F PR PT FALLS ASSESS DOC 0-1 FALLS W/OUT INJ PAST YR: ICD-10-PCS | Mod: CPTII,,, | Performed by: NURSE PRACTITIONER

## 2022-07-11 PROCEDURE — 3078F PR MOST RECENT DIASTOLIC BLOOD PRESSURE < 80 MM HG: ICD-10-PCS | Mod: CPTII,,, | Performed by: NURSE PRACTITIONER

## 2022-07-11 PROCEDURE — 3074F PR MOST RECENT SYSTOLIC BLOOD PRESSURE < 130 MM HG: ICD-10-PCS | Mod: CPTII,,, | Performed by: NURSE PRACTITIONER

## 2022-07-11 PROCEDURE — 1159F MED LIST DOCD IN RCRD: CPT | Mod: CPTII,,, | Performed by: NURSE PRACTITIONER

## 2022-07-11 PROCEDURE — 1126F PR PAIN SEVERITY QUANTIFIED, NO PAIN PRESENT: ICD-10-PCS | Mod: CPTII,,, | Performed by: NURSE PRACTITIONER

## 2022-07-11 PROCEDURE — 1159F PR MEDICATION LIST DOCUMENTED IN MEDICAL RECORD: ICD-10-PCS | Mod: CPTII,,, | Performed by: NURSE PRACTITIONER

## 2022-07-11 PROCEDURE — 3288F PR FALLS RISK ASSESSMENT DOCUMENTED: ICD-10-PCS | Mod: CPTII,,, | Performed by: NURSE PRACTITIONER

## 2022-07-11 PROCEDURE — 99215 OFFICE O/P EST HI 40 MIN: CPT | Mod: S$PBB,,, | Performed by: NURSE PRACTITIONER

## 2022-07-11 RX ORDER — ALBUTEROL SULFATE 0.83 MG/ML
SOLUTION RESPIRATORY (INHALATION)
COMMUNITY
Start: 2022-05-09 | End: 2022-07-11 | Stop reason: SDUPTHER

## 2022-07-11 RX ORDER — FLUTICASONE FUROATE, UMECLIDINIUM BROMIDE AND VILANTEROL TRIFENATATE 100; 62.5; 25 UG/1; UG/1; UG/1
1 POWDER RESPIRATORY (INHALATION) DAILY
Qty: 1 EACH | Refills: 6 | Status: SHIPPED | OUTPATIENT
Start: 2022-07-11 | End: 2022-10-11 | Stop reason: SDUPTHER

## 2022-07-11 RX ORDER — ALBUTEROL SULFATE 90 UG/1
AEROSOL, METERED RESPIRATORY (INHALATION)
COMMUNITY
Start: 2022-06-03 | End: 2022-07-11 | Stop reason: SDUPTHER

## 2022-07-11 RX ORDER — METOPROLOL SUCCINATE 25 MG/1
25 TABLET, EXTENDED RELEASE ORAL
COMMUNITY
Start: 2022-02-02 | End: 2022-08-16 | Stop reason: SDUPTHER

## 2022-07-11 RX ORDER — ALBUTEROL SULFATE 0.83 MG/ML
2.5 SOLUTION RESPIRATORY (INHALATION) 4 TIMES DAILY PRN
Qty: 360 ML | Refills: 2 | Status: SHIPPED | OUTPATIENT
Start: 2022-07-11 | End: 2022-10-11 | Stop reason: SDUPTHER

## 2022-07-11 RX ORDER — FLUTICASONE FUROATE, UMECLIDINIUM BROMIDE AND VILANTEROL TRIFENATATE 100; 62.5; 25 UG/1; UG/1; UG/1
POWDER RESPIRATORY (INHALATION)
COMMUNITY
Start: 2022-05-09 | End: 2022-07-11 | Stop reason: SDUPTHER

## 2022-07-11 RX ORDER — LISINOPRIL 2.5 MG/1
2.5 TABLET ORAL DAILY
Qty: 90 TABLET | Refills: 1 | Status: SHIPPED | OUTPATIENT
Start: 2022-07-11 | End: 2022-10-11 | Stop reason: SDUPTHER

## 2022-07-11 RX ORDER — METFORMIN HYDROCHLORIDE 500 MG/1
500 TABLET ORAL DAILY
COMMUNITY
Start: 2022-06-03 | End: 2022-07-11 | Stop reason: SDUPTHER

## 2022-07-11 RX ORDER — PREDNISONE 20 MG/1
20 TABLET ORAL DAILY
Qty: 7 TABLET | Refills: 0 | Status: SHIPPED | OUTPATIENT
Start: 2022-07-11 | End: 2022-10-11

## 2022-07-11 RX ORDER — PRAVASTATIN SODIUM 40 MG/1
40 TABLET ORAL NIGHTLY
Qty: 90 TABLET | Refills: 1 | Status: SHIPPED | OUTPATIENT
Start: 2022-07-11 | End: 2022-10-11 | Stop reason: SDUPTHER

## 2022-07-11 RX ORDER — PRAVASTATIN SODIUM 40 MG/1
40 TABLET ORAL
COMMUNITY
Start: 2022-01-11 | End: 2022-07-11 | Stop reason: SDUPTHER

## 2022-07-11 RX ORDER — METOPROLOL SUCCINATE 50 MG/1
50 TABLET, EXTENDED RELEASE ORAL
COMMUNITY
Start: 2022-02-02 | End: 2022-08-16 | Stop reason: SDUPTHER

## 2022-07-11 RX ORDER — METFORMIN HYDROCHLORIDE 500 MG/1
500 TABLET ORAL DAILY
Qty: 90 TABLET | Refills: 1 | Status: SHIPPED | OUTPATIENT
Start: 2022-07-11 | End: 2022-10-11 | Stop reason: SDUPTHER

## 2022-07-11 RX ORDER — NAPROXEN SODIUM 220 MG/1
81 TABLET, FILM COATED ORAL DAILY
COMMUNITY
Start: 2021-07-30

## 2022-07-11 RX ORDER — ALBUTEROL SULFATE 90 UG/1
1-2 AEROSOL, METERED RESPIRATORY (INHALATION) EVERY 4 HOURS PRN
Qty: 18 G | Refills: 6 | Status: SHIPPED | OUTPATIENT
Start: 2022-07-11 | End: 2022-12-26 | Stop reason: SDUPTHER

## 2022-07-11 RX ORDER — NITROGLYCERIN 0.4 MG/1
0.4 TABLET SUBLINGUAL
COMMUNITY
Start: 2021-05-24 | End: 2023-05-02 | Stop reason: SDUPTHER

## 2022-07-11 RX ORDER — LISINOPRIL 2.5 MG/1
2.5 TABLET ORAL DAILY
COMMUNITY
Start: 2022-06-03 | End: 2022-07-11 | Stop reason: SDUPTHER

## 2022-07-11 NOTE — PROGRESS NOTES
[facility]  [facility]     Patient Name: Fadumo Lynch   : 1950  MRN:[id number]     CC:  Chief Complaint   Patient presents with    Follow-up     Lab results        HPI  72 year old white female, presents in clinic for f/u labs. C/o increase exertional SOB x 1 month. Using nebulizer 2-3 times per day. Albuterol pump prn if not at home. Uses trelogy daily.  PMH SVT, HLD, HTN, CKD, COPD, T2DM, Osteopenia, multinodular thyroid, basal cell carcinoma, tobacco user. Pt states she is not ready to quit smoking; 10 cig/d. Pt is followed by Renal clinic for stage 3 CKD, cardiology clinic for SVT and medications, and ENT for basal cell carcinoma (nasal lesion) and thyroid nodules. Reports significant improvement in her palpitations with the metoprolol succinate 75 mg twice daily dose as per Cardio. Denies chest pain, shortness of breath, cough, fever, headache, dizziness, weakness, abdominal pain, nausea, vomiting, diarrhea, constipation, dysuria, depression, anxiety.     Thyroid US 19 Multinodular thyroid without significant interval change in the size   of the nodules. There has been overall increase in the volume of the   left lobe of the thyroid.     Mammogram 11/15/2021; BIRADS 1    Cardio appointment 2022  Exercise nuclear stress test on Florina 15, 2021 which revealed no ischemia and no scar.   ECHO 2020; EF 40 %  FIT neg 2021  Following ENT; LOV 3/22/2021        ROS  Review of Systems   Constitutional: Negative.    HENT: Negative.    Eyes: Negative.    Respiratory: Positive for cough and shortness of breath.    Cardiovascular: Negative.    Gastrointestinal: Negative.    Endocrine: Negative.    Genitourinary: Negative.    Musculoskeletal: Negative.    Integumentary:  Negative.   Allergic/Immunologic: Negative.    Neurological: Negative.    Hematological: Negative.    Psychiatric/Behavioral: Negative.    All other systems reviewed and are negative.       Physical  Examination:  Vitals:    07/11/22 0919   BP: (!) 98/54   Pulse: 73   Resp: 18   Temp: 97.8 °F (36.6 °C)          Physical Exam  Vitals and nursing note reviewed.   Constitutional:       Appearance: Normal appearance.   HENT:      Head: Normocephalic and atraumatic.      Right Ear: Tympanic membrane, ear canal and external ear normal.      Left Ear: Tympanic membrane, ear canal and external ear normal.      Nose: Nose normal.      Mouth/Throat:      Mouth: Mucous membranes are moist.      Pharynx: Oropharynx is clear.   Eyes:      Extraocular Movements: Extraocular movements intact.      Conjunctiva/sclera: Conjunctivae normal.      Pupils: Pupils are equal, round, and reactive to light.   Cardiovascular:      Rate and Rhythm: Normal rate and regular rhythm.   Pulmonary:      Effort: Pulmonary effort is normal.      Breath sounds: Normal breath sounds.   Abdominal:      General: Abdomen is flat. Bowel sounds are normal.      Palpations: Abdomen is soft.   Musculoskeletal:         General: Normal range of motion.      Cervical back: Normal range of motion and neck supple.      Comments: Pain with neck extension and right neck bending/rotation   Skin:     General: Skin is warm and dry.      Capillary Refill: Capillary refill takes less than 2 seconds.   Neurological:      General: No focal deficit present.      Mental Status: She is alert and oriented to person, place, and time. Mental status is at baseline.   Psychiatric:         Mood and Affect: Mood normal.         Behavior: Behavior normal.         Thought Content: Thought content normal.         Judgment: Judgment normal.            Labs/Imaging:  Reviewed    Assessment/Plan:  1. Type 2 diabetes mellitus without complication, without long-term current use of insulin  - CBC Auto Differential; Future  - Comprehensive Metabolic Panel; Future  - Lipid Panel; Future  - Hemoglobin A1C; Future  - Microalbumin/Creatinine Ratio, Urine; Future  - Urinalysis, Reflex to Urine  Culture Urine, Clean Catch; Future    Controlled, A1C 5.8.  ADA diet; more fruits and vegetables, lean meats, plant based sources of protein, less added sugar, less processed foods.   Medication compliance, and strict home glucose monitoring advised.  Goal A1C <7 %  Diabetic foot exam annually:  Diabetic eye exam annually:  Urine Microalbumin every 6 months:      2. Hypertension, unspecified type  - CBC Auto Differential; Future  - Comprehensive Metabolic Panel; Future  - Microalbumin/Creatinine Ratio, Urine; Future  - Urinalysis, Reflex to Urine Culture Urine, Clean Catch; Future  BP stable. Refill on lisinopril, metoprolol is prescribed by Cardio for SVT  DASH diet: Eat more fruits, vegetables, and low fat dairy foods.  (Less than 2 grams of sodium per day).  Maintain healthy weight with goal BMI <30.   Exercise 30 minutes per day 5 days per week.  Home medications refilled and continued.   Home BP monitoring encouraged with BP parameters given.       3. Hyperlipidemia, unspecified hyperlipidemia type  - CBC Auto Differential; Future  - Comprehensive Metabolic Panel; Future  - Lipid Panel; Future  - Hemoglobin A1C; Future  Lab Results   Component Value Date    LDL 69.00 07/06/2022    HDL 38 07/06/2022    TRIG 167 (H) 07/06/2022       Cont RX pravastatin 40 daily; refills given. Take Omega 3 daily.   Stressed importance of dietary modifications. Follow a low cholesterol, low saturated fat diet with less that 200mg of cholesterol a day.  Avoid fried foods and high saturated fats (high saturated fats less than 7% of calories).  Add Flax Seed/Fish Oil supplements to diet. Increase dietary fiber.  Regular exercise can reduce LDL and raise HDL. Stressed importance of physical activity 5 times per week for 30 minutes per day.     4. Neck pain  - X-Ray Cervical Spine Complete 5 view; Future  HEP  Will notify with results  Xray right shoulder mild degener changes    5. Chronic obstructive pulmonary disease, unspecified  COPD type  - X-Ray Chest PA And Lateral; Future  - TRELEGY ELLIPTA 100-62.5-25 mcg DsDv; Inhale 1 puff into the lungs once daily.  Dispense: 1 each; Refill: 6  - albuterol (PROVENTIL/VENTOLIN HFA) 90 mcg/actuation inhaler; Inhale 1-2 puffs into the lungs every 4 (four) hours as needed for Wheezing or Shortness of Breath.  Dispense: 18 g; Refill: 6  - albuterol (PROVENTIL) 2.5 mg /3 mL (0.083 %) nebulizer solution; Take 3 mLs (2.5 mg total) by nebulization 4 (four) times daily as needed for Wheezing or Shortness of Breath.  Dispense: 360 mL; Refill: 2    Increase exertion SOB x 1 month. Afebrile.  CXR today.  Start on Prednisone 20 daily x 7 days.  Unable to tolerate PFT 10/2/2021.  Trelogy was started in [facility] on 3/2021. Last exacerbation >1 yr.  Smoking cessation encouraged, not ready to quit, 10 cigs per day.  Consider PULM referral.    6. Tobacco use  - CT Chest Lung Screening Low Dose; Future  Smoking cessation advised. Instructed on smoking cessation program through [facility] and pharmacological interventions to aid in cessation.    7. BMI 28.0-28.9,adult  Goal BMI <30.  Exercise 5 times a week for 30 minutes per day.  Avoid soda, simple sugars, excessive rice, potatoes or bread. Limit fast foods and fried foods.  Choose complex carbs in moderation (example: green vegetables, beans, oatmeal). Eat plenty of fresh fruits and vegetables with lean meats daily.  Do not skip meals. Eat a balanced portion size.  Avoid fad diets. Consider permanent healthy life style changes.         Medication List with Changes/Refills   New Medications    PREDNISONE (DELTASONE) 20 MG TABLET    Take 1 tablet (20 mg total) by mouth once daily.   Current Medications    ASPIRIN 81 MG CHEW    Take 81 mg by mouth.    METOPROLOL SUCCINATE (TOPROL-XL) 25 MG 24 HR TABLET    Take 25 mg by mouth.    METOPROLOL SUCCINATE (TOPROL-XL) 50 MG 24 HR TABLET    Take 50 mg by mouth.    NITROGLYCERIN (NITROSTAT) 0.4 MG SL TABLET    Place 0.4 mg under the tongue.    Changed and/or Refilled Medications    Modified Medication Previous Medication    ALBUTEROL (PROVENTIL) 2.5 MG /3 ML (0.083 %) NEBULIZER SOLUTION albuterol (PROVENTIL) 2.5 mg /3 mL (0.083 %) nebulizer solution       Take 3 mLs (2.5 mg total) by nebulization 4 (four) times daily as needed for Wheezing or Shortness of Breath.    Take by nebulization.    ALBUTEROL (PROVENTIL/VENTOLIN HFA) 90 MCG/ACTUATION INHALER albuterol (PROVENTIL/VENTOLIN HFA) 90 mcg/actuation inhaler       Inhale 1-2 puffs into the lungs every 4 (four) hours as needed for Wheezing or Shortness of Breath.    Inhale into the lungs.    LISINOPRIL (PRINIVIL,ZESTRIL) 2.5 MG TABLET lisinopriL (PRINIVIL,ZESTRIL) 2.5 MG tablet       Take 1 tablet (2.5 mg total) by mouth once daily.    Take 2.5 mg by mouth once daily.    METFORMIN (GLUCOPHAGE) 500 MG TABLET metFORMIN (GLUCOPHAGE) 500 MG tablet       Take 1 tablet (500 mg total) by mouth once daily.    Take 500 mg by mouth once daily.    PRAVASTATIN (PRAVACHOL) 40 MG TABLET pravastatin (PRAVACHOL) 40 MG tablet       Take 1 tablet (40 mg total) by mouth every evening.    Take 40 mg by mouth.    TRELEGY ELLIPTA 100-62.5-25 MCG DSDV TRELEGY ELLIPTA 100-62.5-25 mcg DsDv       Inhale 1 puff into the lungs once daily.       Discontinued Medications    HYDROCODONE-ACETAMINOPHEN (NORCO) 5-325 MG PER TABLET    Take 1 tablet by mouth every 8 (eight) hours as needed for Pain.        Orders Placed This Encounter   Procedures    X-Ray Cervical Spine Complete 5 view    X-Ray Chest PA And Lateral    CT Chest Lung Screening Low Dose    CBC Auto Differential    Comprehensive Metabolic Panel    Lipid Panel    Hemoglobin A1C    Microalbumin/Creatinine Ratio, Urine    Urinalysis, Reflex to Urine Culture Urine, Clean Catch         Future Appointments   Date Time Provider Department Center   10/11/2022  8:00 AM [facility]   12/13/2022  7:30 AM TR[facility]         Labs thoroughly reviewed with patient. Medication refills addressed today.  RTC prn and 3 months, with labs 1 week prior to the apt.  COVID 19 precautions given to patient.  Patient voices understanding of all discharge instructions.

## 2022-08-02 ENCOUNTER — HOSPITAL ENCOUNTER (OUTPATIENT)
Dept: RADIOLOGY | Facility: HOSPITAL | Age: 72
Discharge: HOME | End: 2022-08-02
Attending: NURSE PRACTITIONER
Payer: COMMERCIAL

## 2022-08-02 DIAGNOSIS — Z72.0 TOBACCO USE: ICD-10-CM

## 2022-08-02 PROCEDURE — 71271 CT THORAX LUNG CANCER SCR C-: CPT | Mod: TC

## 2022-08-03 ENCOUNTER — TELEPHONE (OUTPATIENT)
Dept: INTERNAL MEDICINE | Facility: CLINIC | Age: 72
End: 2022-08-03

## 2022-08-03 NOTE — TELEPHONE ENCOUNTER
----- Message from ANGELITO Soto sent at 8/3/2022 11:38 AM CDT -----  Please schedule pt a virtual apt next available to review imaging results. thanks

## 2022-08-04 ENCOUNTER — OFFICE VISIT (OUTPATIENT)
Dept: INTERNAL MEDICINE | Facility: CLINIC | Age: 72
End: 2022-08-04
Attending: NURSE PRACTITIONER
Payer: COMMERCIAL

## 2022-08-04 DIAGNOSIS — J44.9 CHRONIC OBSTRUCTIVE PULMONARY DISEASE, UNSPECIFIED COPD TYPE: ICD-10-CM

## 2022-08-04 DIAGNOSIS — R93.89 OPACITY NOTED ON IMAGING STUDY: ICD-10-CM

## 2022-08-04 DIAGNOSIS — Z72.0 TOBACCO USE: ICD-10-CM

## 2022-08-04 DIAGNOSIS — Z72.0 TOBACCO USER: ICD-10-CM

## 2022-08-04 DIAGNOSIS — I10 HYPERTENSION, UNSPECIFIED TYPE: ICD-10-CM

## 2022-08-04 PROCEDURE — 3288F FALL RISK ASSESSMENT DOCD: CPT | Mod: CPTII,95,, | Performed by: NURSE PRACTITIONER

## 2022-08-04 PROCEDURE — 1160F RVW MEDS BY RX/DR IN RCRD: CPT | Mod: CPTII,95,, | Performed by: NURSE PRACTITIONER

## 2022-08-04 PROCEDURE — 3288F PR FALLS RISK ASSESSMENT DOCUMENTED: ICD-10-PCS | Mod: CPTII,95,, | Performed by: NURSE PRACTITIONER

## 2022-08-04 PROCEDURE — 99406 PR TOBACCO USE CESSATION INTERMEDIATE 3-10 MINUTES: ICD-10-PCS | Mod: 95,,, | Performed by: NURSE PRACTITIONER

## 2022-08-04 PROCEDURE — 99214 OFFICE O/P EST MOD 30 MIN: CPT | Mod: 95,25,, | Performed by: NURSE PRACTITIONER

## 2022-08-04 PROCEDURE — 1160F PR REVIEW ALL MEDS BY PRESCRIBER/CLIN PHARMACIST DOCUMENTED: ICD-10-PCS | Mod: CPTII,95,, | Performed by: NURSE PRACTITIONER

## 2022-08-04 PROCEDURE — 1101F PR PT FALLS ASSESS DOC 0-1 FALLS W/OUT INJ PAST YR: ICD-10-PCS | Mod: CPTII,95,, | Performed by: NURSE PRACTITIONER

## 2022-08-04 PROCEDURE — 99214 PR OFFICE/OUTPT VISIT, EST, LEVL IV, 30-39 MIN: ICD-10-PCS | Mod: 95,25,, | Performed by: NURSE PRACTITIONER

## 2022-08-04 PROCEDURE — 99406 BEHAV CHNG SMOKING 3-10 MIN: CPT | Mod: 95,,, | Performed by: NURSE PRACTITIONER

## 2022-08-04 PROCEDURE — 4010F PR ACE/ARB THEARPY RXD/TAKEN: ICD-10-PCS | Mod: CPTII,95,, | Performed by: NURSE PRACTITIONER

## 2022-08-04 PROCEDURE — 4010F ACE/ARB THERAPY RXD/TAKEN: CPT | Mod: CPTII,95,, | Performed by: NURSE PRACTITIONER

## 2022-08-04 PROCEDURE — 1159F PR MEDICATION LIST DOCUMENTED IN MEDICAL RECORD: ICD-10-PCS | Mod: CPTII,95,, | Performed by: NURSE PRACTITIONER

## 2022-08-04 PROCEDURE — 1101F PT FALLS ASSESS-DOCD LE1/YR: CPT | Mod: CPTII,95,, | Performed by: NURSE PRACTITIONER

## 2022-08-04 PROCEDURE — 1159F MED LIST DOCD IN RCRD: CPT | Mod: CPTII,95,, | Performed by: NURSE PRACTITIONER

## 2022-08-04 RX ORDER — CHOLECALCIFEROL (VITAMIN D3) 25 MCG
2000 TABLET ORAL DAILY
COMMUNITY
Start: 2021-05-18

## 2022-08-04 NOTE — PROGRESS NOTES
Established Patient - Audio Only Telehealth Visit     The patient location is: home  The chief complaint leading to consultation is: CT lung results  Visit type: Virtual visit with audio only (telephone)  Total time spent with patient: 35       The reason for the audio only service rather than synchronous audio and video virtual visit was related to technical difficulties or patient preference/necessity.     Each patient to whom I provide medical services by telemedicine is:  (1) informed of the relationship between the physician and patient and the respective role of any other health care provider with respect to management of the patient; and (2) notified that they may decline to receive medical services by telemedicine and may withdraw from such care at any time. Patient verbally consented to receive this service via voice-only telephone call.    [facility]  [facility]     Patient Name: Fadumo Lynch   : 1950  MRN:[id number]     CC:  Chief Complaint   Patient presents with    Follow-up     CT Results        HPI  72 year old white female, presents TM for CT lung cancer screening results. CT displayed opacity in the medial right middle lobe may represent scarring, atelectasis or pulmonary nodule. Otherwise there are scattered small sub 6 mm pulmonary nodules. Note, that she was seen in clinic on 2022 with report of FRASER, and was started in clinic on prednisone 20mg daily x 7days. She states moderate improvement in breathing since completion of the steroids. States only using Nebulizer once a day now verses 3 times a day as before. Uses trelogy daily.    PMH SVT, HLD, HTN, CKD, COPD, T2DM, Osteopenia, multinodular thyroid, basal cell carcinoma, tobacco user. Pt states she is not ready to quit smoking; 10 cig/d. Pt is followed by Renal clinic for stage 3 CKD, cardiology clinic for SVT and medications, and ENT for basal cell carcinoma (nasal lesion) and thyroid nodules. Reports  significant improvement in her palpitations with the metoprolol succinate 75 mg twice daily dose as per Cardio. Denies chest pain, shortness of breath, cough, fever, headache, dizziness, weakness, abdominal pain, nausea, vomiting, diarrhea, constipation, dysuria, depression, anxiety.    Thyroid US 6/20/19 Multinodular thyroid without significant interval change in the size  of the nodules. There has been overall increase in the volume of the  left lobe of the thyroid.     Mammogram 11/15/2021; BIRADS 1     Cardio appointment 2/2/2022  Exercise nuclear stress test on Florina 15, 2021 which revealed no ischemia and no scar.   ECHO 9/2020; EF 40 %  FIT neg 9/21/2021  Following ENT; LOV 3/22/2021        ROS  Review of Systems   Constitutional: Negative.    HENT: Negative.    Eyes: Negative.    Respiratory: Negative.    Cardiovascular: Negative.    Gastrointestinal: Negative.    Endocrine: Negative.    Genitourinary: Negative.    Musculoskeletal: Negative.    Integumentary:  Negative.   Allergic/Immunologic: Negative.    Neurological: Negative.    Hematological: Negative.    Psychiatric/Behavioral: Negative.    All other systems reviewed and are negative.       Physical Examination:  There were no vitals filed for this visit.       Physical Exam  Vitals and nursing note reviewed.   Constitutional:       Appearance: Normal appearance.   HENT:      Head: Normocephalic and atraumatic.      Right Ear: Tympanic membrane, ear canal and external ear normal.      Left Ear: Tympanic membrane, ear canal and external ear normal.      Nose: Nose normal.      Mouth/Throat:      Mouth: Mucous membranes are moist.      Pharynx: Oropharynx is clear.   Eyes:      Extraocular Movements: Extraocular movements intact.      Conjunctiva/sclera: Conjunctivae normal.      Pupils: Pupils are equal, round, and reactive to light.   Cardiovascular:      Rate and Rhythm: Normal rate and regular rhythm.   Pulmonary:      Effort: Pulmonary effort is  normal.      Breath sounds: Normal breath sounds.   Abdominal:      General: Abdomen is flat. Bowel sounds are normal.      Palpations: Abdomen is soft.   Musculoskeletal:         General: Normal range of motion.      Cervical back: Normal range of motion and neck supple.   Skin:     General: Skin is warm and dry.      Capillary Refill: Capillary refill takes less than 2 seconds.   Neurological:      General: No focal deficit present.      Mental Status: She is alert and oriented to person, place, and time. Mental status is at baseline.   Psychiatric:         Mood and Affect: Mood normal.         Behavior: Behavior normal.         Thought Content: Thought content normal.         Judgment: Judgment normal.            Labs/Imaging:  Reviewed  CT CHEST LUNG SCREENING LOW DOSE     CLINICAL HISTORY:  Lung cancer screening, >= 30 pk-yr current smoker (Age 55-80y); Tobacco use     TECHNIQUE:  Helical acquired axial images, sagittal and coronal reformations were obtained from the thoracic inlet to the lung bases without the IV administration of contrast.  Low Dose screening protocol     Automated tube current modulation, weight-based exposure dosing, and/or iterative reconstruction technique utilized to reach lowest reasonably achievable exposure rate.     DLP: 102 mGy*cm     COMPARISON:  CT chest 10/07/2017     FINDINGS:  LUNG NODULES: Within the medial right middle lobe there is a nodular density measuring 1.8 cm.  This may be related to atelectasis, scarring or pulmonary nodule.  Otherwise there are scattered sub 6 mm pulmonary nodules within the lungs.  For example there is a 4.7 mm right upper lobe nodule (2, 87) and a 4.1 mm left upper lobe nodule (2, 48).     LUNGS/PLEURA: No lobar consolidation.     AIRWAYS: Patent.     BASE OF NECK: No significant abnormality.     AORTA: Aortic atherosclerosis.     HEART: Normal size. No effusion. There are coronary artery calcifications.     JEROME/MEDIASTINUM: No enlarged lymph  nodes by size criteria.     UPPER ABDOMEN: No abnormality of the partially imaged upper abdomen.     THORACIC SOFT TISSUES: Unremarkable.     BONES: Unchanged convex insufficiency deformities at multiple thoracic end plates.     Impression:     1. Opacity in the medial right middle lobe may represent scarring, atelectasis or pulmonary nodule.  2. Otherwise there are scattered small sub 6 mm pulmonary nodules.  ASSESSMENT:     LUNG RADS 4b. Suspicious.  Recommend CT chest with contrast, PET/CT and/or tissue sampling.     Note: For new large nodules that develop on an annual repeat screening, a 1 month LDCT may be recommended to address potentially infection or inflammatory conditions.     (Lung-RADS v 1.1. Release date 2019).        Electronically signed by: Savannah Law  Date:                                            08/02/2022    Assessment/Plan:  1. Chronic obstructive pulmonary disease, unspecified COPD type  CT reviewed  Smoking cessation-referral  Unable to tolerate PFT 10/2/2021.  Trelogy and albuterol prn continued    2. Opacity noted on imaging study  - Ambulatory referral/consult to Pulmonology; Future- [facility]  CT reviewed  Smoking cessation-referral  Unable to tolerate PFT 10/2/2021.  Trelogy and albuterol prn continued    3. Hypertension, unspecified type  RX lisinopril, metoprolol continued as prescribed by Cardio for SVT  DASH diet: Eat more fruits, vegetables, and low fat dairy foods.  (Less than 2 grams of sodium per day).  Maintain healthy weight with goal BMI <30.   Exercise 30 minutes per day 5 days per week.  Home medications refilled and continued.   Home BP monitoring encouraged with BP parameters given.       4. Tobacco user  - Ambulatory referral/consult to Pulmonology; Future  -Ambulatory referral smoking cessation program    >5 min spent on tobacco cessation counseling  Smoking cessation advised. Instructed on smoking cessation program through [facility] and pharmacological interventions to aid  in cessation.      Medication List with Changes/Refills   Current Medications    ALBUTEROL (PROVENTIL) 2.5 MG /3 ML (0.083 %) NEBULIZER SOLUTION    Take 3 mLs (2.5 mg total) by nebulization 4 (four) times daily as needed for Wheezing or Shortness of Breath.    ALBUTEROL (PROVENTIL/VENTOLIN HFA) 90 MCG/ACTUATION INHALER    Inhale 1-2 puffs into the lungs every 4 (four) hours as needed for Wheezing or Shortness of Breath.    ASPIRIN 81 MG CHEW    Take 81 mg by mouth.    LISINOPRIL (PRINIVIL,ZESTRIL) 2.5 MG TABLET    Take 1 tablet (2.5 mg total) by mouth once daily.    METFORMIN (GLUCOPHAGE) 500 MG TABLET    Take 1 tablet (500 mg total) by mouth once daily.    METOPROLOL SUCCINATE (TOPROL-XL) 25 MG 24 HR TABLET    Take 25 mg by mouth.    METOPROLOL SUCCINATE (TOPROL-XL) 50 MG 24 HR TABLET    Take 50 mg by mouth.    NITROGLYCERIN (NITROSTAT) 0.4 MG SL TABLET    Place 0.4 mg under the tongue.    PRAVASTATIN (PRAVACHOL) 40 MG TABLET    Take 1 tablet (40 mg total) by mouth every evening.    PREDNISONE (DELTASONE) 20 MG TABLET    Take 1 tablet (20 mg total) by mouth once daily.    TRELEGY ELLIPTA 100-62.5-25 MCG DSDV    Inhale 1 puff into the lungs once daily.    VITAMIN D (VITAMIN D3) 1000 UNITS TAB    Take by mouth.        Orders Placed This Encounter   Procedures    Ambulatory referral/consult to Pulmonology    Ambulatory referral/consult to Smoking Cessation Program         Future Appointments   Date Time Provider Department Center   8/16/2022  9:45 AM NHAN Smith [facility] CARD [place]    10/11/2022  8:00 AM ANGELITO Soto [facility] INTMED [place]    12/13/2022  7:30 AM KIRIT [id number]         Imaging thoroughly reviewed with patient. Medication reconciled.   RTC prn and as scheduled 10/11/2022, with labs 1 week prior to the apt.  COVID 19 precautions given to patient.  Patient voices understanding of all discharge instructions.                              This service was not  originating from a related E/M service provided within the previous 7 days nor will  to an E/M service or procedure within the next 24 hours or my soonest available appointment.  Prevailing standard of care was able to be met in this audio-only visit.

## 2022-08-16 ENCOUNTER — OFFICE VISIT (OUTPATIENT)
Dept: CARDIOLOGY | Facility: CLINIC | Age: 72
End: 2022-08-16
Payer: COMMERCIAL

## 2022-08-16 VITALS
RESPIRATION RATE: 20 BRPM | WEIGHT: 155 LBS | OXYGEN SATURATION: 94 % | BODY MASS INDEX: 28.52 KG/M2 | SYSTOLIC BLOOD PRESSURE: 108 MMHG | DIASTOLIC BLOOD PRESSURE: 56 MMHG | HEIGHT: 62 IN | HEART RATE: 73 BPM

## 2022-08-16 DIAGNOSIS — R06.09 DOE (DYSPNEA ON EXERTION): ICD-10-CM

## 2022-08-16 DIAGNOSIS — I10 PRIMARY HYPERTENSION: ICD-10-CM

## 2022-08-16 DIAGNOSIS — Z72.0 TOBACCO USE: ICD-10-CM

## 2022-08-16 DIAGNOSIS — I47.10 SUPRAVENTRICULAR TACHYCARDIA: ICD-10-CM

## 2022-08-16 DIAGNOSIS — E78.2 MIXED HYPERLIPIDEMIA: ICD-10-CM

## 2022-08-16 DIAGNOSIS — I50.20 HEART FAILURE WITH REDUCED EJECTION FRACTION: Primary | ICD-10-CM

## 2022-08-16 PROCEDURE — 1159F PR MEDICATION LIST DOCUMENTED IN MEDICAL RECORD: ICD-10-PCS | Mod: CPTII,,, | Performed by: NURSE PRACTITIONER

## 2022-08-16 PROCEDURE — 4010F ACE/ARB THERAPY RXD/TAKEN: CPT | Mod: CPTII,,, | Performed by: NURSE PRACTITIONER

## 2022-08-16 PROCEDURE — 3078F DIAST BP <80 MM HG: CPT | Mod: CPTII,,, | Performed by: NURSE PRACTITIONER

## 2022-08-16 PROCEDURE — 99214 PR OFFICE/OUTPT VISIT, EST, LEVL IV, 30-39 MIN: ICD-10-PCS | Mod: S$PBB,,, | Performed by: NURSE PRACTITIONER

## 2022-08-16 PROCEDURE — 3074F PR MOST RECENT SYSTOLIC BLOOD PRESSURE < 130 MM HG: ICD-10-PCS | Mod: CPTII,,, | Performed by: NURSE PRACTITIONER

## 2022-08-16 PROCEDURE — 1159F MED LIST DOCD IN RCRD: CPT | Mod: CPTII,,, | Performed by: NURSE PRACTITIONER

## 2022-08-16 PROCEDURE — 3288F FALL RISK ASSESSMENT DOCD: CPT | Mod: CPTII,,, | Performed by: NURSE PRACTITIONER

## 2022-08-16 PROCEDURE — 3288F PR FALLS RISK ASSESSMENT DOCUMENTED: ICD-10-PCS | Mod: CPTII,,, | Performed by: NURSE PRACTITIONER

## 2022-08-16 PROCEDURE — 3008F BODY MASS INDEX DOCD: CPT | Mod: CPTII,,, | Performed by: NURSE PRACTITIONER

## 2022-08-16 PROCEDURE — 4010F PR ACE/ARB THEARPY RXD/TAKEN: ICD-10-PCS | Mod: CPTII,,, | Performed by: NURSE PRACTITIONER

## 2022-08-16 PROCEDURE — 1101F PR PT FALLS ASSESS DOC 0-1 FALLS W/OUT INJ PAST YR: ICD-10-PCS | Mod: CPTII,,, | Performed by: NURSE PRACTITIONER

## 2022-08-16 PROCEDURE — 99214 OFFICE O/P EST MOD 30 MIN: CPT | Mod: PBBFAC | Performed by: NURSE PRACTITIONER

## 2022-08-16 PROCEDURE — 1160F PR REVIEW ALL MEDS BY PRESCRIBER/CLIN PHARMACIST DOCUMENTED: ICD-10-PCS | Mod: CPTII,,, | Performed by: NURSE PRACTITIONER

## 2022-08-16 PROCEDURE — 1101F PT FALLS ASSESS-DOCD LE1/YR: CPT | Mod: CPTII,,, | Performed by: NURSE PRACTITIONER

## 2022-08-16 PROCEDURE — 99214 OFFICE O/P EST MOD 30 MIN: CPT | Mod: S$PBB,,, | Performed by: NURSE PRACTITIONER

## 2022-08-16 PROCEDURE — 1160F RVW MEDS BY RX/DR IN RCRD: CPT | Mod: CPTII,,, | Performed by: NURSE PRACTITIONER

## 2022-08-16 PROCEDURE — 3074F SYST BP LT 130 MM HG: CPT | Mod: CPTII,,, | Performed by: NURSE PRACTITIONER

## 2022-08-16 PROCEDURE — 3078F PR MOST RECENT DIASTOLIC BLOOD PRESSURE < 80 MM HG: ICD-10-PCS | Mod: CPTII,,, | Performed by: NURSE PRACTITIONER

## 2022-08-16 PROCEDURE — 3008F PR BODY MASS INDEX (BMI) DOCUMENTED: ICD-10-PCS | Mod: CPTII,,, | Performed by: NURSE PRACTITIONER

## 2022-08-16 RX ORDER — METOPROLOL SUCCINATE 25 MG/1
25 TABLET, EXTENDED RELEASE ORAL DAILY
Qty: 90 TABLET | Refills: 3 | OUTPATIENT
Start: 2022-08-16 | End: 2022-12-26

## 2022-08-16 RX ORDER — METOPROLOL SUCCINATE 50 MG/1
50 TABLET, EXTENDED RELEASE ORAL DAILY
Qty: 90 TABLET | Refills: 3 | Status: SHIPPED | OUTPATIENT
Start: 2022-08-16 | End: 2022-12-26 | Stop reason: SDUPTHER

## 2022-08-16 NOTE — PROGRESS NOTES
CHIEF COMPLAINT:   Chief Complaint   Patient presents with    Follow-up     6 mth f/u; no cardiac complaints; needs refills                   Review of patient's allergies indicates:   Allergen Reactions    Naproxen Hives    Zinc sulfate      Other reaction(s): LATE EFFECT OF BURNS OF OTHER SPECIFIED SITES                                          HPI:  Fadumo Lynch 72 y.o. female with a past medical history of HFrEF (TTE 9/15/20: EF 40%), HTN, HLD, SVT, NIDDM, COPD, and tobacco use presents for 6 month follow up and ongoing care.  She completed an Exercise Nuclear Stress Test on Florina 15, 2021 which revealed no ischemia and no scar.      Patient denies chest pain, palpitations, orthopnea, PND, peripheral edema, or syncope.  She does endorse shortness of breath with exertion that is worse with ambulating longer distances.  She reports compliance with her medications.  She continues to smoke approximately a half a pack of cigarettes per day, but was recently referred to the smoking cessation program by her PCP with plans to quit.  Of note, patient states she completed a CT scan of the chest earlier this month and was noted to have lung nodules in which she was referred to pulmonology by her PCP.  She states she is awaiting an appointment.      Exercise nuclear stress test Florina 15, 2021:  No ischemia  No scar                                                                                                                                                                                                                                                                                  Patient Active Problem List   Diagnosis    Chronic obstructive pulmonary disease    Chronic kidney disease    Hyperlipidemia    Hypertension    Supraventricular tachycardia    Type 2 diabetes mellitus    Tobacco user    Tobacco use     Past Surgical History:   Procedure Laterality Date    TONSILLECTOMY AND ADENOIDECTOMY  1964     VAGINAL HYSTERECTOMY  1986     Social History     Socioeconomic History    Marital status:     Number of children: 2   Occupational History    Occupation: Retired   Tobacco Use    Smoking status: Current Every Day Smoker     Packs/day: 0.50     Types: Cigarettes    Smokeless tobacco: Never Used   Substance and Sexual Activity    Alcohol use: Never    Drug use: Never        Family History   Problem Relation Age of Onset    Colon cancer Mother     Cancer Father          Current Outpatient Medications:     aspirin 81 MG Chew, Take 81 mg by mouth., Disp: , Rfl:     lisinopriL (PRINIVIL,ZESTRIL) 2.5 MG tablet, Take 1 tablet (2.5 mg total) by mouth once daily., Disp: 90 tablet, Rfl: 1    metFORMIN (GLUCOPHAGE) 500 MG tablet, Take 1 tablet (500 mg total) by mouth once daily., Disp: 90 tablet, Rfl: 1    metoprolol succinate (TOPROL-XL) 25 MG 24 hr tablet, Take 25 mg by mouth., Disp: , Rfl:     metoprolol succinate (TOPROL-XL) 50 MG 24 hr tablet, Take 50 mg by mouth., Disp: , Rfl:     nitroGLYCERIN (NITROSTAT) 0.4 MG SL tablet, Place 0.4 mg under the tongue., Disp: , Rfl:     pravastatin (PRAVACHOL) 40 MG tablet, Take 1 tablet (40 mg total) by mouth every evening., Disp: 90 tablet, Rfl: 1    TRELEGY ELLIPTA 100-62.5-25 mcg DsDv, Inhale 1 puff into the lungs once daily., Disp: 1 each, Rfl: 6    vitamin D (VITAMIN D3) 1000 units Tab, Take by mouth., Disp: , Rfl:     albuterol (PROVENTIL) 2.5 mg /3 mL (0.083 %) nebulizer solution, Take 3 mLs (2.5 mg total) by nebulization 4 (four) times daily as needed for Wheezing or Shortness of Breath., Disp: 360 mL, Rfl: 2    albuterol (PROVENTIL/VENTOLIN HFA) 90 mcg/actuation inhaler, Inhale 1-2 puffs into the lungs every 4 (four) hours as needed for Wheezing or Shortness of Breath., Disp: 18 g, Rfl: 6    predniSONE (DELTASONE) 20 MG tablet, Take 1 tablet (20 mg total) by mouth once daily., Disp: 7 tablet, Rfl: 0     ROS:                                           "                                                                                                                                   Review of Systems   Constitutional: Negative.    Respiratory: Positive for shortness of breath.    Cardiovascular: Negative.    Gastrointestinal: Negative.    Musculoskeletal: Negative.    Skin: Negative.    Neurological: Negative.    Psychiatric/Behavioral: Negative.         Blood pressure (!) 108/56, pulse 73, resp. rate 20, height 5' 2" (1.575 m), weight 70.3 kg (154 lb 15.7 oz), SpO2 (!) 94 %.   PE:  Physical Exam  Constitutional:       Appearance: Normal appearance.   HENT:      Head: Normocephalic and atraumatic.   Eyes:      Extraocular Movements: Extraocular movements intact.      Pupils: Pupils are equal, round, and reactive to light.   Cardiovascular:      Rate and Rhythm: Normal rate and regular rhythm.   Pulmonary:      Effort: Pulmonary effort is normal.      Breath sounds: Wheezing present.   Abdominal:      Palpations: Abdomen is soft.   Musculoskeletal:         General: Normal range of motion.      Cervical back: Normal range of motion. No tenderness.   Skin:     General: Skin is warm and dry.   Neurological:      General: No focal deficit present.      Mental Status: She is alert and oriented to person, place, and time.   Psychiatric:         Mood and Affect: Mood normal.         Behavior: Behavior normal.       ASSESSMENT/PLAN:  Heart failure with reduced ejection fraction - NYHA Class II-III  TTE 9/15/20: EF 40%, previously EF was 55% in 2014  Exercise Nuclear Stress Test 6.15.21: No ischemia   She endorses SOB with increased ambulation  Will repeat Echocardiogram   Continue GDMT with Toprol XL and Lisinopril  Counseled on low salt diet - max 2g daily    Chest Pain- resolved  Exercise Nuclear Stress Test 6.15.21: No ischemia   Continue Aspirin 81mg daily and SL Nitro prn CP    HTN (hypertension)  BP at goal - 108/56  Continue Toprol XL and Lisinopril   Counseled on low " salt diet    SVT (supraventricular tachycardia)   Denies palpitations  Continue Toprol XL 75mg daily    DM2 (type 2 diabetes mellitus)  Management per PCP    Hyperlipidemia   LDL at goal - 69  Continue Pravastatin 40mg daily at bedtime  Counseled on low cholesterol diet    COPD  Management per PCP - recently referred to Pulmonology by her PCP for further evaluation of lung nodules    CKD  Management per Nephrology    Tobacco Use   Continues to smoke about a half a pack of cigarettes per day  Counseled on smoking cessation - recently referred to the smoking cessation by her PCP    Echocardiogram  Follow up in Cardiology Clinic in 4 months  Follow up with PCP as directed

## 2022-08-17 DIAGNOSIS — Z72.0 TOBACCO USE: ICD-10-CM

## 2022-08-17 DIAGNOSIS — R91.8 LUNG NODULES: Primary | ICD-10-CM

## 2022-09-02 ENCOUNTER — TELEPHONE (OUTPATIENT)
Dept: PULMONOLOGY | Facility: CLINIC | Age: 72
End: 2022-09-02
Payer: COMMERCIAL

## 2022-09-06 ENCOUNTER — HOSPITAL ENCOUNTER (OUTPATIENT)
Dept: RADIOLOGY | Facility: HOSPITAL | Age: 72
Discharge: HOME | End: 2022-09-06
Attending: INTERNAL MEDICINE
Payer: COMMERCIAL

## 2022-09-06 DIAGNOSIS — R91.8 LUNG NODULES: ICD-10-CM

## 2022-09-06 DIAGNOSIS — Z72.0 TOBACCO USE: ICD-10-CM

## 2022-09-06 PROCEDURE — 78815 PET IMAGE W/CT SKULL-THIGH: CPT | Mod: TC,PS

## 2022-09-06 PROCEDURE — A9552 F18 FDG: HCPCS

## 2022-09-09 ENCOUNTER — TELEPHONE (OUTPATIENT)
Dept: PULMONOLOGY | Facility: CLINIC | Age: 72
End: 2022-09-09
Payer: COMMERCIAL

## 2022-09-09 LAB — POCT GLUCOSE: 124 MG/DL (ref 70–110)

## 2022-09-12 ENCOUNTER — OFFICE VISIT (OUTPATIENT)
Dept: PULMONOLOGY | Facility: CLINIC | Age: 72
End: 2022-09-12
Attending: NURSE PRACTITIONER
Payer: COMMERCIAL

## 2022-09-12 VITALS
RESPIRATION RATE: 20 BRPM | TEMPERATURE: 99 F | HEART RATE: 67 BPM | OXYGEN SATURATION: 96 % | BODY MASS INDEX: 28.89 KG/M2 | DIASTOLIC BLOOD PRESSURE: 67 MMHG | HEIGHT: 62 IN | SYSTOLIC BLOOD PRESSURE: 103 MMHG | WEIGHT: 157 LBS

## 2022-09-12 DIAGNOSIS — J44.9 CHRONIC OBSTRUCTIVE PULMONARY DISEASE, UNSPECIFIED COPD TYPE: Primary | ICD-10-CM

## 2022-09-12 DIAGNOSIS — R91.8 LUNG MASS: ICD-10-CM

## 2022-09-12 DIAGNOSIS — R91.8 PULMONARY NODULES: Primary | ICD-10-CM

## 2022-09-12 DIAGNOSIS — Z72.0 TOBACCO USER: ICD-10-CM

## 2022-09-12 PROCEDURE — 1159F MED LIST DOCD IN RCRD: CPT | Mod: CPTII,,, | Performed by: INTERNAL MEDICINE

## 2022-09-12 PROCEDURE — 3008F PR BODY MASS INDEX (BMI) DOCUMENTED: ICD-10-PCS | Mod: CPTII,,, | Performed by: INTERNAL MEDICINE

## 2022-09-12 PROCEDURE — 99204 OFFICE O/P NEW MOD 45 MIN: CPT | Mod: S$PBB,,, | Performed by: INTERNAL MEDICINE

## 2022-09-12 PROCEDURE — 3074F PR MOST RECENT SYSTOLIC BLOOD PRESSURE < 130 MM HG: ICD-10-PCS | Mod: CPTII,,, | Performed by: INTERNAL MEDICINE

## 2022-09-12 PROCEDURE — 99204 PR OFFICE/OUTPT VISIT, NEW, LEVL IV, 45-59 MIN: ICD-10-PCS | Mod: S$PBB,,, | Performed by: INTERNAL MEDICINE

## 2022-09-12 PROCEDURE — 1101F PT FALLS ASSESS-DOCD LE1/YR: CPT | Mod: CPTII,,, | Performed by: INTERNAL MEDICINE

## 2022-09-12 PROCEDURE — 3078F DIAST BP <80 MM HG: CPT | Mod: CPTII,,, | Performed by: INTERNAL MEDICINE

## 2022-09-12 PROCEDURE — 1126F PR PAIN SEVERITY QUANTIFIED, NO PAIN PRESENT: ICD-10-PCS | Mod: CPTII,,, | Performed by: INTERNAL MEDICINE

## 2022-09-12 PROCEDURE — 1101F PR PT FALLS ASSESS DOC 0-1 FALLS W/OUT INJ PAST YR: ICD-10-PCS | Mod: CPTII,,, | Performed by: INTERNAL MEDICINE

## 2022-09-12 PROCEDURE — 3074F SYST BP LT 130 MM HG: CPT | Mod: CPTII,,, | Performed by: INTERNAL MEDICINE

## 2022-09-12 PROCEDURE — 99214 OFFICE O/P EST MOD 30 MIN: CPT | Mod: PBBFAC

## 2022-09-12 PROCEDURE — 4010F ACE/ARB THERAPY RXD/TAKEN: CPT | Mod: CPTII,,, | Performed by: INTERNAL MEDICINE

## 2022-09-12 PROCEDURE — 1159F PR MEDICATION LIST DOCUMENTED IN MEDICAL RECORD: ICD-10-PCS | Mod: CPTII,,, | Performed by: INTERNAL MEDICINE

## 2022-09-12 PROCEDURE — 1126F AMNT PAIN NOTED NONE PRSNT: CPT | Mod: CPTII,,, | Performed by: INTERNAL MEDICINE

## 2022-09-12 PROCEDURE — 4010F PR ACE/ARB THEARPY RXD/TAKEN: ICD-10-PCS | Mod: CPTII,,, | Performed by: INTERNAL MEDICINE

## 2022-09-12 PROCEDURE — 3008F BODY MASS INDEX DOCD: CPT | Mod: CPTII,,, | Performed by: INTERNAL MEDICINE

## 2022-09-12 PROCEDURE — 3288F FALL RISK ASSESSMENT DOCD: CPT | Mod: CPTII,,, | Performed by: INTERNAL MEDICINE

## 2022-09-12 PROCEDURE — 3078F PR MOST RECENT DIASTOLIC BLOOD PRESSURE < 80 MM HG: ICD-10-PCS | Mod: CPTII,,, | Performed by: INTERNAL MEDICINE

## 2022-09-12 PROCEDURE — 3288F PR FALLS RISK ASSESSMENT DOCUMENTED: ICD-10-PCS | Mod: CPTII,,, | Performed by: INTERNAL MEDICINE

## 2022-09-12 NOTE — PROGRESS NOTES
"Subjective:       Patient ID: Fadumo Lynch is a 72 y.o. female.    Chief Complaint:  Pulmonary nodule follow-up    HPI  This is a 72-year-old female here for initial evaluation for lung nodule.  She had a CT scan on 08/02/2022 showing a 1.8 cm medial right middle lobe subpleural nodule.  PET scan performed 09/06/2022 shows stable nodule with only low-grade activity.  It was compared to an October 2017 scan and radiologist reports it is only slightly increased in size.    She continues to smoke tobacco.  Not interested in quitting    Objective:   /67 (BP Location: Left arm, Patient Position: Sitting, BP Method: Medium (Automatic))   Pulse 67   Temp 98.9 °F (37.2 °C)   Resp 20   Ht 5' 2" (1.575 m)   Wt 71.2 kg (157 lb)   SpO2 96% Comment: room air  BMI 28.72 kg/m²     GENERAL: NAD, A & 0 X 3, calm  CARDIAC: RRR, NO m/g/r  PULM: CTA BL, No wheezing or rales  ABD: NT/ND/S. Bowel sounds heard  EXT: no cyanosis, clubbing, or edema, peripheral pulses intact  NEURO: no obvious neurosensory deficits, no dysarthria noted  PSYCH: no agitation or anxiety   EYES: tracks examiner around room, pupil reactive to light  NECK: trachea midline, no obvious JVD    ROS:  General: no fevers or chills reported  Endocrine: denies polyuria or polydipsia. Denies weight loss  Card:denies CP or palpitations. Denies jaw pain  Neuro: denies hemiparesis or facial droop. Denies dysarthia  Ext: no cyanosis, clubbing, or edema  Resp: see HPI  Eyes: denies discharge, changes in vision, or pain with eye movements  Psychiatric: denies suicidal thoughts or depression  GI: denies nausea, vomiting, or diarhea      Current Outpatient Medications   Medication Instructions    albuterol (PROVENTIL) 2.5 mg, Nebulization, 4 times daily PRN    albuterol (PROVENTIL/VENTOLIN HFA) 90 mcg/actuation inhaler 1-2 puffs, Inhalation, Every 4 hours PRN    aspirin 81 mg, Oral    lisinopriL (PRINIVIL,ZESTRIL) 2.5 mg, Oral, Daily    metFORMIN (GLUCOPHAGE) " 500 mg, Oral, Daily    metoprolol succinate (TOPROL-XL) 25 mg, Oral, Daily    metoprolol succinate (TOPROL-XL) 50 mg, Oral, Daily    nitroGLYCERIN (NITROSTAT) 0.4 mg, Sublingual    pravastatin (PRAVACHOL) 40 mg, Oral, Nightly    predniSONE (DELTASONE) 20 mg, Oral, Daily    TRELEGY ELLIPTA 100-62.5-25 mcg DsDv 1 puff, Inhalation, Daily    vitamin D (VITAMIN D3) 1000 units Tab Oral       Assessment:       Pulmonary nodules    Tobacco user  -     Ambulatory referral/consult to Pulmonology      Plan:     -I personally reviewed scan.  It is unclear exactly where this nodule is is not obviously evident.  Series and imaging not mentioned on previous scans.  However it does mention that this area is 1.8 cm in size and is only slightly increased compared to 2017 scans.  Low risk for malignancy in my opinion.  Continue with lung cancer screening CTs, will order a CT scan in 1 year as my concern is that this is benign in nature.  Unlikely even if this is malignancy that it will progress on this time as it has not progressed significantly in over 5 years per radiology report    Leonard Genao MD

## 2022-10-04 ENCOUNTER — LAB VISIT (OUTPATIENT)
Dept: LAB | Facility: HOSPITAL | Age: 72
End: 2022-10-04
Attending: NURSE PRACTITIONER
Payer: COMMERCIAL

## 2022-10-04 DIAGNOSIS — E78.5 HYPERLIPIDEMIA, UNSPECIFIED HYPERLIPIDEMIA TYPE: ICD-10-CM

## 2022-10-04 DIAGNOSIS — E11.9 T2DM (TYPE 2 DIABETES MELLITUS): ICD-10-CM

## 2022-10-04 DIAGNOSIS — E11.9 TYPE 2 DIABETES MELLITUS WITHOUT COMPLICATION, WITHOUT LONG-TERM CURRENT USE OF INSULIN: ICD-10-CM

## 2022-10-04 DIAGNOSIS — E04.2 MULTINODULAR THYROID: ICD-10-CM

## 2022-10-04 DIAGNOSIS — I10 HYPERTENSION, UNSPECIFIED TYPE: ICD-10-CM

## 2022-10-04 DIAGNOSIS — E55.9 VITAMIN D DEFICIENCY: ICD-10-CM

## 2022-10-04 DIAGNOSIS — N18.9 CKD (CHRONIC KIDNEY DISEASE): ICD-10-CM

## 2022-10-04 LAB
ALBUMIN SERPL-MCNC: 3.6 GM/DL (ref 3.4–4.8)
ALBUMIN/GLOB SERPL: 0.9 RATIO (ref 1.1–2)
ALP SERPL-CCNC: 110 UNIT/L (ref 40–150)
ALT SERPL-CCNC: 23 UNIT/L (ref 0–55)
APPEARANCE UR: CLEAR
AST SERPL-CCNC: 21 UNIT/L (ref 5–34)
BACTERIA #/AREA URNS AUTO: ABNORMAL /HPF
BASOPHILS # BLD AUTO: 0.07 X10(3)/MCL (ref 0–0.2)
BASOPHILS NFR BLD AUTO: 0.6 %
BILIRUB UR QL STRIP.AUTO: NEGATIVE MG/DL
BILIRUBIN DIRECT+TOT PNL SERPL-MCNC: 0.4 MG/DL
BUN SERPL-MCNC: 10.5 MG/DL (ref 9.8–20.1)
CALCIUM SERPL-MCNC: 9.7 MG/DL (ref 8.4–10.2)
CHLORIDE SERPL-SCNC: 98 MMOL/L (ref 98–107)
CHOLEST SERPL-MCNC: 157 MG/DL
CHOLEST/HDLC SERPL: 3 {RATIO} (ref 0–5)
CO2 SERPL-SCNC: 32 MMOL/L (ref 23–31)
COLOR UR AUTO: YELLOW
CREAT SERPL-MCNC: 0.88 MG/DL (ref 0.55–1.02)
CREAT UR-MCNC: 205.8 MG/DL (ref 47–110)
DEPRECATED CALCIDIOL+CALCIFEROL SERPL-MC: 35.1 NG/ML (ref 30–80)
EOSINOPHIL # BLD AUTO: 0.4 X10(3)/MCL (ref 0–0.9)
EOSINOPHIL NFR BLD AUTO: 3.4 %
ERYTHROCYTE [DISTWIDTH] IN BLOOD BY AUTOMATED COUNT: 13.1 % (ref 11.5–17)
EST. AVERAGE GLUCOSE BLD GHB EST-MCNC: 119.8 MG/DL
GFR SERPLBLD CREATININE-BSD FMLA CKD-EPI: >60 MLS/MIN/1.73/M2
GLOBULIN SER-MCNC: 4.2 GM/DL (ref 2.4–3.5)
GLUCOSE SERPL-MCNC: 111 MG/DL (ref 82–115)
GLUCOSE UR QL STRIP.AUTO: NORMAL MG/DL
HBA1C MFR BLD: 5.8 %
HCT VFR BLD AUTO: 45.9 % (ref 37–47)
HDLC SERPL-MCNC: 46 MG/DL (ref 35–60)
HGB BLD-MCNC: 14.8 GM/DL (ref 12–16)
HYALINE CASTS #/AREA URNS LPF: ABNORMAL /LPF
IMM GRANULOCYTES # BLD AUTO: 0.06 X10(3)/MCL (ref 0–0.04)
IMM GRANULOCYTES NFR BLD AUTO: 0.5 %
KETONES UR QL STRIP.AUTO: NEGATIVE MG/DL
LDLC SERPL CALC-MCNC: 81 MG/DL (ref 50–140)
LEUKOCYTE ESTERASE UR QL STRIP.AUTO: NEGATIVE UNIT/L
LYMPHOCYTES # BLD AUTO: 3.56 X10(3)/MCL (ref 0.6–4.6)
LYMPHOCYTES NFR BLD AUTO: 30.2 %
MCH RBC QN AUTO: 30.8 PG (ref 27–31)
MCHC RBC AUTO-ENTMCNC: 32.2 MG/DL (ref 33–36)
MCV RBC AUTO: 95.4 FL (ref 80–94)
MICROALBUMIN UR-MCNC: 6.2 UG/ML
MICROALBUMIN/CREAT RATIO PNL UR: 3 MG/GM CR (ref 0–30)
MONOCYTES # BLD AUTO: 0.72 X10(3)/MCL (ref 0.1–1.3)
MONOCYTES NFR BLD AUTO: 6.1 %
MUCOUS THREADS URNS QL MICRO: ABNORMAL /LPF
NEUTROPHILS # BLD AUTO: 7 X10(3)/MCL (ref 2.1–9.2)
NEUTROPHILS NFR BLD AUTO: 59.2 %
NITRITE UR QL STRIP.AUTO: NEGATIVE
NRBC BLD AUTO-RTO: 0 %
PH UR STRIP.AUTO: 5.5 [PH]
PLATELET # BLD AUTO: 330 X10(3)/MCL (ref 130–400)
PMV BLD AUTO: 9.1 FL (ref 7.4–10.4)
POTASSIUM SERPL-SCNC: 4.8 MMOL/L (ref 3.5–5.1)
PROT SERPL-MCNC: 7.8 GM/DL (ref 5.8–7.6)
PROT UR QL STRIP.AUTO: NEGATIVE MG/DL
RBC # BLD AUTO: 4.81 X10(6)/MCL (ref 4.2–5.4)
RBC #/AREA URNS AUTO: ABNORMAL /HPF
RBC UR QL AUTO: NEGATIVE UNIT/L
SODIUM SERPL-SCNC: 137 MMOL/L (ref 136–145)
SP GR UR STRIP.AUTO: 1.03
SQUAMOUS #/AREA URNS LPF: ABNORMAL /HPF
T4 FREE SERPL-MCNC: 0.88 NG/DL (ref 0.7–1.48)
TRIGL SERPL-MCNC: 151 MG/DL (ref 37–140)
TSH SERPL-ACNC: 0.87 UIU/ML (ref 0.35–4.94)
UROBILINOGEN UR STRIP-ACNC: ABNORMAL MG/DL
VLDLC SERPL CALC-MCNC: 30 MG/DL
WBC # SPEC AUTO: 11.8 X10(3)/MCL (ref 4.5–11.5)
WBC #/AREA URNS AUTO: ABNORMAL /HPF

## 2022-10-04 PROCEDURE — 84439 ASSAY OF FREE THYROXINE: CPT

## 2022-10-04 PROCEDURE — 82306 VITAMIN D 25 HYDROXY: CPT

## 2022-10-04 PROCEDURE — 80053 COMPREHEN METABOLIC PANEL: CPT

## 2022-10-04 PROCEDURE — 84443 ASSAY THYROID STIM HORMONE: CPT

## 2022-10-04 PROCEDURE — 82043 UR ALBUMIN QUANTITATIVE: CPT

## 2022-10-04 PROCEDURE — 36415 COLL VENOUS BLD VENIPUNCTURE: CPT

## 2022-10-04 PROCEDURE — 85025 COMPLETE CBC W/AUTO DIFF WBC: CPT

## 2022-10-04 PROCEDURE — 83036 HEMOGLOBIN GLYCOSYLATED A1C: CPT

## 2022-10-04 PROCEDURE — 80061 LIPID PANEL: CPT

## 2022-10-04 PROCEDURE — 81001 URINALYSIS AUTO W/SCOPE: CPT

## 2022-10-07 ENCOUNTER — HOSPITAL ENCOUNTER (OUTPATIENT)
Dept: CARDIOLOGY | Facility: HOSPITAL | Age: 72
Discharge: HOME | End: 2022-10-07
Attending: NURSE PRACTITIONER
Payer: COMMERCIAL

## 2022-10-07 VITALS
DIASTOLIC BLOOD PRESSURE: 75 MMHG | WEIGHT: 157 LBS | SYSTOLIC BLOOD PRESSURE: 136 MMHG | BODY MASS INDEX: 28.89 KG/M2 | HEIGHT: 62 IN

## 2022-10-07 DIAGNOSIS — I50.20 HEART FAILURE WITH REDUCED EJECTION FRACTION: ICD-10-CM

## 2022-10-07 DIAGNOSIS — R06.09 DOE (DYSPNEA ON EXERTION): ICD-10-CM

## 2022-10-07 LAB
AV INDEX (PROSTH): 0.68
AV MEAN GRADIENT: 3 MMHG
AV PEAK GRADIENT: 5 MMHG
AV VALVE AREA: 2.23 CM2
AV VELOCITY RATIO: 0.73
BSA FOR ECHO PROCEDURE: 1.76 M2
CV ECHO LV RWT: 0.42 CM
DOP CALC AO PEAK VEL: 1.07 M/S
DOP CALC AO VTI: 26.1 CM
DOP CALC LVOT AREA: 3.3 CM2
DOP CALC LVOT DIAMETER: 2.04 CM
DOP CALC LVOT PEAK VEL: 0.78 M/S
DOP CALC LVOT STROKE VOLUME: 58.15 CM3
DOP CALC MV VTI: 27.4 CM
DOP CALCLVOT PEAK VEL VTI: 17.8 CM
E WAVE DECELERATION TIME: 173.69 MSEC
E/A RATIO: 0.99
E/E' RATIO: 11.25 M/S
ECHO LV POSTERIOR WALL: 0.86 CM (ref 0.6–1.1)
EJECTION FRACTION: 55 %
FRACTIONAL SHORTENING: 30 % (ref 28–44)
INTERVENTRICULAR SEPTUM: 1 CM (ref 0.6–1.1)
LEFT ATRIUM SIZE: 3.31 CM
LEFT INTERNAL DIMENSION IN SYSTOLE: 2.9 CM (ref 2.1–4)
LEFT VENTRICLE DIASTOLIC VOLUME INDEX: 44.11 ML/M2
LEFT VENTRICLE DIASTOLIC VOLUME: 75.87 ML
LEFT VENTRICLE MASS INDEX: 71 G/M2
LEFT VENTRICLE SYSTOLIC VOLUME INDEX: 18.7 ML/M2
LEFT VENTRICLE SYSTOLIC VOLUME: 32.18 ML
LEFT VENTRICULAR INTERNAL DIMENSION IN DIASTOLE: 4.14 CM (ref 3.5–6)
LEFT VENTRICULAR MASS: 121.27 G
LV LATERAL E/E' RATIO: 11.25 M/S
LV SEPTAL E/E' RATIO: 11.25 M/S
LVOT MG: 1.1 MMHG
LVOT MV: 0.49 CM/S
MV MEAN GRADIENT: 1 MMHG
MV PEAK A VEL: 0.91 M/S
MV PEAK E VEL: 0.9 M/S
MV PEAK GRADIENT: 3 MMHG
MV STENOSIS PRESSURE HALF TIME: 50.37 MS
MV VALVE AREA BY CONTINUITY EQUATION: 2.12 CM2
MV VALVE AREA P 1/2 METHOD: 4.37 CM2
PISA TR MAX VEL: 1.51 M/S
RA PRESSURE: 3 MMHG
RIGHT VENTRICULAR END-DIASTOLIC DIMENSION: 2.37 CM
TDI LATERAL: 0.08 M/S
TDI SEPTAL: 0.08 M/S
TDI: 0.08 M/S
TR MAX PG: 9 MMHG
TV REST PULMONARY ARTERY PRESSURE: 12 MMHG

## 2022-10-07 PROCEDURE — 93306 TTE W/DOPPLER COMPLETE: CPT

## 2022-10-11 ENCOUNTER — OFFICE VISIT (OUTPATIENT)
Dept: INTERNAL MEDICINE | Facility: CLINIC | Age: 72
End: 2022-10-11
Attending: NURSE PRACTITIONER
Payer: COMMERCIAL

## 2022-10-11 VITALS
SYSTOLIC BLOOD PRESSURE: 108 MMHG | RESPIRATION RATE: 18 BRPM | BODY MASS INDEX: 29.33 KG/M2 | HEIGHT: 62 IN | WEIGHT: 159.38 LBS | HEART RATE: 64 BPM | DIASTOLIC BLOOD PRESSURE: 52 MMHG | TEMPERATURE: 98 F

## 2022-10-11 DIAGNOSIS — E78.5 HYPERLIPIDEMIA, UNSPECIFIED HYPERLIPIDEMIA TYPE: ICD-10-CM

## 2022-10-11 DIAGNOSIS — I50.20 HEART FAILURE WITH REDUCED EJECTION FRACTION: ICD-10-CM

## 2022-10-11 DIAGNOSIS — M85.80 OSTEOPENIA AFTER MENOPAUSE: ICD-10-CM

## 2022-10-11 DIAGNOSIS — I10 HYPERTENSION, UNSPECIFIED TYPE: ICD-10-CM

## 2022-10-11 DIAGNOSIS — M19.049 HAND ARTHRITIS: ICD-10-CM

## 2022-10-11 DIAGNOSIS — E04.2 MULTINODULAR THYROID: ICD-10-CM

## 2022-10-11 DIAGNOSIS — Z23 NEED FOR INFLUENZA VACCINATION: ICD-10-CM

## 2022-10-11 DIAGNOSIS — E11.9 TYPE 2 DIABETES MELLITUS WITHOUT COMPLICATION, WITHOUT LONG-TERM CURRENT USE OF INSULIN: ICD-10-CM

## 2022-10-11 DIAGNOSIS — Z78.0 OSTEOPENIA AFTER MENOPAUSE: ICD-10-CM

## 2022-10-11 DIAGNOSIS — E55.9 VITAMIN D DEFICIENCY: ICD-10-CM

## 2022-10-11 DIAGNOSIS — I47.10 SUPRAVENTRICULAR TACHYCARDIA: ICD-10-CM

## 2022-10-11 DIAGNOSIS — R91.8 PULMONARY NODULES: ICD-10-CM

## 2022-10-11 DIAGNOSIS — Z12.11 COLON CANCER SCREENING: ICD-10-CM

## 2022-10-11 DIAGNOSIS — J44.9 CHRONIC OBSTRUCTIVE PULMONARY DISEASE, UNSPECIFIED COPD TYPE: ICD-10-CM

## 2022-10-11 DIAGNOSIS — F17.210 CIGARETTE NICOTINE DEPENDENCE WITHOUT COMPLICATION: ICD-10-CM

## 2022-10-11 PROCEDURE — 3078F PR MOST RECENT DIASTOLIC BLOOD PRESSURE < 80 MM HG: ICD-10-PCS | Mod: CPTII,,, | Performed by: NURSE PRACTITIONER

## 2022-10-11 PROCEDURE — 1159F MED LIST DOCD IN RCRD: CPT | Mod: CPTII,,, | Performed by: NURSE PRACTITIONER

## 2022-10-11 PROCEDURE — 99215 OFFICE O/P EST HI 40 MIN: CPT | Mod: PBBFAC,25 | Performed by: NURSE PRACTITIONER

## 2022-10-11 PROCEDURE — 99214 OFFICE O/P EST MOD 30 MIN: CPT | Mod: 25,S$PBB,, | Performed by: NURSE PRACTITIONER

## 2022-10-11 PROCEDURE — 1160F RVW MEDS BY RX/DR IN RCRD: CPT | Mod: CPTII,,, | Performed by: NURSE PRACTITIONER

## 2022-10-11 PROCEDURE — 90694 VACC AIIV4 NO PRSRV 0.5ML IM: CPT | Mod: PBBFAC

## 2022-10-11 PROCEDURE — 3078F DIAST BP <80 MM HG: CPT | Mod: CPTII,,, | Performed by: NURSE PRACTITIONER

## 2022-10-11 PROCEDURE — 99214 PR OFFICE/OUTPT VISIT, EST, LEVL IV, 30-39 MIN: ICD-10-PCS | Mod: 25,S$PBB,, | Performed by: NURSE PRACTITIONER

## 2022-10-11 PROCEDURE — 3066F NEPHROPATHY DOC TX: CPT | Mod: CPTII,,, | Performed by: NURSE PRACTITIONER

## 2022-10-11 PROCEDURE — 1126F AMNT PAIN NOTED NONE PRSNT: CPT | Mod: CPTII,,, | Performed by: NURSE PRACTITIONER

## 2022-10-11 PROCEDURE — 1101F PT FALLS ASSESS-DOCD LE1/YR: CPT | Mod: CPTII,,, | Performed by: NURSE PRACTITIONER

## 2022-10-11 PROCEDURE — 3288F FALL RISK ASSESSMENT DOCD: CPT | Mod: CPTII,,, | Performed by: NURSE PRACTITIONER

## 2022-10-11 PROCEDURE — 3061F NEG MICROALBUMINURIA REV: CPT | Mod: CPTII,,, | Performed by: NURSE PRACTITIONER

## 2022-10-11 PROCEDURE — 3061F PR NEG MICROALBUMINURIA RESULT DOCUMENTED/REVIEW: ICD-10-PCS | Mod: CPTII,,, | Performed by: NURSE PRACTITIONER

## 2022-10-11 PROCEDURE — 4010F PR ACE/ARB THEARPY RXD/TAKEN: ICD-10-PCS | Mod: CPTII,,, | Performed by: NURSE PRACTITIONER

## 2022-10-11 PROCEDURE — 3074F PR MOST RECENT SYSTOLIC BLOOD PRESSURE < 130 MM HG: ICD-10-PCS | Mod: CPTII,,, | Performed by: NURSE PRACTITIONER

## 2022-10-11 PROCEDURE — 1159F PR MEDICATION LIST DOCUMENTED IN MEDICAL RECORD: ICD-10-PCS | Mod: CPTII,,, | Performed by: NURSE PRACTITIONER

## 2022-10-11 PROCEDURE — 3066F PR DOCUMENTATION OF TREATMENT FOR NEPHROPATHY: ICD-10-PCS | Mod: CPTII,,, | Performed by: NURSE PRACTITIONER

## 2022-10-11 PROCEDURE — 99406 PR TOBACCO USE CESSATION INTERMEDIATE 3-10 MINUTES: ICD-10-PCS | Mod: S$PBB,,, | Performed by: NURSE PRACTITIONER

## 2022-10-11 PROCEDURE — G0008 ADMIN INFLUENZA VIRUS VAC: HCPCS | Mod: PBBFAC

## 2022-10-11 PROCEDURE — 3074F SYST BP LT 130 MM HG: CPT | Mod: CPTII,,, | Performed by: NURSE PRACTITIONER

## 2022-10-11 PROCEDURE — 1101F PR PT FALLS ASSESS DOC 0-1 FALLS W/OUT INJ PAST YR: ICD-10-PCS | Mod: CPTII,,, | Performed by: NURSE PRACTITIONER

## 2022-10-11 PROCEDURE — 4010F ACE/ARB THERAPY RXD/TAKEN: CPT | Mod: CPTII,,, | Performed by: NURSE PRACTITIONER

## 2022-10-11 PROCEDURE — 3008F PR BODY MASS INDEX (BMI) DOCUMENTED: ICD-10-PCS | Mod: CPTII,,, | Performed by: NURSE PRACTITIONER

## 2022-10-11 PROCEDURE — 1160F PR REVIEW ALL MEDS BY PRESCRIBER/CLIN PHARMACIST DOCUMENTED: ICD-10-PCS | Mod: CPTII,,, | Performed by: NURSE PRACTITIONER

## 2022-10-11 PROCEDURE — 99406 BEHAV CHNG SMOKING 3-10 MIN: CPT | Mod: S$PBB,,, | Performed by: NURSE PRACTITIONER

## 2022-10-11 PROCEDURE — 3288F PR FALLS RISK ASSESSMENT DOCUMENTED: ICD-10-PCS | Mod: CPTII,,, | Performed by: NURSE PRACTITIONER

## 2022-10-11 PROCEDURE — 1126F PR PAIN SEVERITY QUANTIFIED, NO PAIN PRESENT: ICD-10-PCS | Mod: CPTII,,, | Performed by: NURSE PRACTITIONER

## 2022-10-11 PROCEDURE — 3008F BODY MASS INDEX DOCD: CPT | Mod: CPTII,,, | Performed by: NURSE PRACTITIONER

## 2022-10-11 RX ORDER — DICLOFENAC SODIUM 10 MG/G
2 GEL TOPICAL 4 TIMES DAILY PRN
Qty: 100 G | Refills: 2 | Status: SHIPPED | OUTPATIENT
Start: 2022-10-11

## 2022-10-11 RX ORDER — FLUTICASONE FUROATE, UMECLIDINIUM BROMIDE AND VILANTEROL TRIFENATATE 100; 62.5; 25 UG/1; UG/1; UG/1
1 POWDER RESPIRATORY (INHALATION) DAILY
Qty: 1 EACH | Refills: 6 | Status: SHIPPED | OUTPATIENT
Start: 2022-10-11 | End: 2023-04-11 | Stop reason: SDUPTHER

## 2022-10-11 RX ORDER — PRAVASTATIN SODIUM 80 MG/1
80 TABLET ORAL NIGHTLY
Qty: 90 TABLET | Refills: 1 | Status: SHIPPED | OUTPATIENT
Start: 2022-10-11 | End: 2022-12-16 | Stop reason: SDUPTHER

## 2022-10-11 RX ORDER — METFORMIN HYDROCHLORIDE 500 MG/1
500 TABLET ORAL DAILY
Qty: 90 TABLET | Refills: 1 | Status: SHIPPED | OUTPATIENT
Start: 2022-10-11 | End: 2023-04-11 | Stop reason: SDUPTHER

## 2022-10-11 RX ORDER — ALBUTEROL SULFATE 0.83 MG/ML
2.5 SOLUTION RESPIRATORY (INHALATION) 4 TIMES DAILY PRN
Qty: 360 ML | Refills: 2 | Status: SHIPPED | OUTPATIENT
Start: 2022-10-11 | End: 2023-04-11 | Stop reason: SDUPTHER

## 2022-10-11 RX ORDER — LISINOPRIL 2.5 MG/1
2.5 TABLET ORAL DAILY
Qty: 90 TABLET | Refills: 1 | Status: SHIPPED | OUTPATIENT
Start: 2022-10-11 | End: 2022-12-16 | Stop reason: SDUPTHER

## 2022-10-11 RX ADMIN — INFLUENZA A VIRUS A/VICTORIA/2570/2019 IVR-215 (H1N1) ANTIGEN (FORMALDEHYDE INACTIVATED), INFLUENZA A VIRUS A/DARWIN/6/2021 IVR-227 (H3N2) ANTIGEN (FORMALDEHYDE INACTIVATED), INFLUENZA B VIRUS B/AUSTRIA/1359417/2021 BVR-26 ANTIGEN (FORMALDEHYDE INACTIVATED), INFLUENZA B VIRUS B/PHUKET/3073/2013 BVR-1B ANTIGEN (FORMALDEHYDE INACTIVATED) 0.5 ML: 15; 15; 15; 15 INJECTION, SUSPENSION INTRAMUSCULAR at 09:10

## 2022-10-11 NOTE — PROGRESS NOTES
Internal Medicine Clinic  ANGELITO Soto     Patient Name: Fadumo Lynch   : 1950  MRN:[id number]     CC:  Chief Complaint   Patient presents with    3mo. f/u-c/o arthritis. Wants flu vaccine        HPI  72 year old white female, presents in clinic for lab results. C/o right hand and wrist pain and intermittent swelling. States ASA is not helping.     PMH SVT, HLD, HTN, CKD, COPD, T2DM, Osteopenia, multinodular thyroid, basal cell carcinoma, tobacco user. Pt states she is not ready to quit smoking; 10 cig/d. Pt is followed by Renal clinic for stage 3 CKD, cardiology clinic for SVT and medications, and ENT for basal cell carcinoma (nasal lesion) and thyroid nodules. Reports significant improvement in her palpitations with the metoprolol succinate 75 mg twice daily dose as per Cardio. Pulmonary clinic is following for pulmonary nodule. Denies chest pain, shortness of breath, cough, fever, headache, dizziness, weakness, abdominal pain, nausea, vomiting, diarrhea, constipation, dysuria, depression, anxiety.     Thyroid US 19 Multinodular thyroid without significant interval change in the size  of the nodules. There has been overall increase in the volume of the  left lobe of the thyroid.     Mammogram 11/15/2021; BIRADS 1    Exercise nuclear stress test on Flroina 15, 2021 which revealed no ischemia and no scar.   ECHO 2020; EF 40 %  FIT neg 2021  Following ENT; LOV 3/22/2021  Diabetic fundus; 2022 No retinopathy        ROS  Review of Systems   Constitutional: Negative.    HENT: Negative.     Eyes: Negative.    Respiratory: Negative.     Cardiovascular: Negative.    Gastrointestinal: Negative.    Endocrine: Negative.    Genitourinary: Negative.    Musculoskeletal:  Positive for arthralgias.   Integumentary:  Negative.   Allergic/Immunologic: Negative.    Neurological: Negative.    Hematological: Negative.    Psychiatric/Behavioral: Negative.     All other systems reviewed and are negative.      Physical Examination:  Vitals:    10/11/22 0913   BP: (!) 108/52   Pulse: 64   Resp: 18   Temp: 98.4 °F (36.9 °C)          Physical Exam  Vitals and nursing note reviewed.   Constitutional:       Appearance: Normal appearance.   HENT:      Head: Normocephalic and atraumatic.      Right Ear: Tympanic membrane, ear canal and external ear normal.      Left Ear: Tympanic membrane, ear canal and external ear normal.      Nose: Nose normal.      Mouth/Throat:      Mouth: Mucous membranes are moist.      Pharynx: Oropharynx is clear.   Eyes:      Extraocular Movements: Extraocular movements intact.      Conjunctiva/sclera: Conjunctivae normal.      Pupils: Pupils are equal, round, and reactive to light.   Cardiovascular:      Rate and Rhythm: Normal rate and regular rhythm.      Pulses: Normal pulses.      Heart sounds: Normal heart sounds.   Pulmonary:      Effort: Pulmonary effort is normal.      Breath sounds: Normal breath sounds.   Abdominal:      General: Abdomen is flat. Bowel sounds are normal.      Palpations: Abdomen is soft.   Musculoskeletal:         General: Normal range of motion.      Cervical back: Normal range of motion and neck supple.   Skin:     General: Skin is warm and dry.      Capillary Refill: Capillary refill takes less than 2 seconds.   Neurological:      General: No focal deficit present.      Mental Status: She is alert and oriented to person, place, and time. Mental status is at baseline.   Psychiatric:         Mood and Affect: Mood normal.         Behavior: Behavior normal.         Thought Content: Thought content normal.         Judgment: Judgment normal.        Protective Sensation (w/ 10 gram monofilament):  Right: Intact  Left: Intact    Visual Inspection:  Normal -  Bilateral    Pedal Pulses:   Right: Present  Left: Present    Posterior tibialis:   Right:Present  Left: Present         Labs/Imaging:  Reviewed    Assessment/Plan:  1. Hyperlipidemia, unspecified hyperlipidemia type  Lab  Results   Component Value Date    LDL 81.00 10/04/2022    HDL 46 10/04/2022    TRIG 151 (H) 10/04/2022       Cont RX daily; refills given. Take Omega 3 daily.   Stressed importance of dietary modifications. Follow a low cholesterol, low saturated fat diet with less that 200mg of cholesterol a day.  Avoid fried foods and high saturated fats (high saturated fats less than 7% of calories).  Add Flax Seed/Fish Oil supplements to diet. Increase dietary fiber.  Regular exercise can reduce LDL and raise HDL. Stressed importance of physical activity 5 times per week for 30 minutes per day.      2. Type 2 diabetes mellitus without complication, without long-term current use of insulin  A1C stable at 5.8  Meds continued.  ADA diet; more fruits and vegetables, lean meats, plant based sources of protein, less added sugar, less processed foods.   Medication compliance, and strict home glucose monitoring advised.  Goal A1C <7 %  Diabetic foot exam annually:  Diabetic eye exam annually:  Urine Microalbumin every 6 months:    3. Hypertension, unspecified type  BP stable. Med refills  DASH diet: Eat more fruits, vegetables, and low fat dairy foods.  (Less than 2 grams of sodium per day).  Maintain healthy weight with goal BMI <30.   Exercise 30 minutes per day 5 days per week.  Home medications refilled and continued.   Home BP monitoring encouraged with BP parameters given.   Sodium Level   Date Value Ref Range Status   10/04/2022 137 136 - 145 mmol/L Final     Potassium Level   Date Value Ref Range Status   10/04/2022 4.8 3.5 - 5.1 mmol/L Final     Blood Urea Nitrogen   Date Value Ref Range Status   10/04/2022 10.5 9.8 - 20.1 mg/dL Final     Creatinine   Date Value Ref Range Status   10/04/2022 0.88 0.55 - 1.02 mg/dL Final     Estimated GFR-Non    Date Value Ref Range Status   07/06/2022 >60 mls/min/1.73/m2 Final       4. Chronic obstructive pulmonary disease, unspecified COPD type  - albuterol (PROVENTIL) 2.5 mg  /3 mL (0.083 %) nebulizer solution; Take 3 mLs (2.5 mg total) by nebulization 4 (four) times daily as needed for Wheezing or Shortness of Breath.  Dispense: 360 mL; Refill: 2  - TRELEGY ELLIPTA 100-62.5-25 mcg DsDv; Inhale 1 puff into the lungs once daily.  Dispense: 1 each; Refill: 6  Not ready to quit smoking; smoking cessation encouraged    5. Pulmonary nodules  Following PULM    6. Supraventricular tachycardia  Following Cardio    7. Heart failure with reduced ejection fraction  Following Cardio    8. Cigarette nicotine dependence without complication  Smoking cessation advised. Instructed on smoking cessation program through [facility] and pharmacological interventions to aid in cessation.  >5 minutes allotted to educate patient on the harms of smoking, the urgency to quit, and the development of a plan for smoking cessation.    9. Need for influenza vaccination  - influenza (QUADRIVALENT ADJUVANTED PF) vaccine 0.5 mL    10. Osteopenia after menopause  - DXA Bone Density Spine And Hip; Future    11. Multinodular thyroid  - US Thyroid; Future    12. Colon cancer screening  - Cologuard Screening (Multitarget Stool DNA); Future  - Cologuard Screening (Multitarget Stool DNA)     13. Hand Arthritis  RX DICLOFENAC GEL prn      Medication List with Changes/Refills   New Medications    DICLOFENAC SODIUM (VOLTAREN) 1 % GEL    Apply 2 g topically 4 (four) times daily as needed (hand pain).   Current Medications    ALBUTEROL (PROVENTIL/VENTOLIN HFA) 90 MCG/ACTUATION INHALER    Inhale 1-2 puffs into the lungs every 4 (four) hours as needed for Wheezing or Shortness of Breath.    ASPIRIN 81 MG CHEW    Take 81 mg by mouth.    METOPROLOL SUCCINATE (TOPROL-XL) 25 MG 24 HR TABLET    Take 1 tablet (25 mg total) by mouth once daily.    METOPROLOL SUCCINATE (TOPROL-XL) 50 MG 24 HR TABLET    Take 1 tablet (50 mg total) by mouth once daily.    NITROGLYCERIN (NITROSTAT) 0.4 MG SL TABLET    Place 0.4 mg under the tongue.    VITAMIN D  (VITAMIN D3) 1000 UNITS TAB    Take by mouth.   Changed and/or Refilled Medications    Modified Medication Previous Medication    ALBUTEROL (PROVENTIL) 2.5 MG /3 ML (0.083 %) NEBULIZER SOLUTION albuterol (PROVENTIL) 2.5 mg /3 mL (0.083 %) nebulizer solution       Take 3 mLs (2.5 mg total) by nebulization 4 (four) times daily as needed for Wheezing or Shortness of Breath.    Take 3 mLs (2.5 mg total) by nebulization 4 (four) times daily as needed for Wheezing or Shortness of Breath.    LISINOPRIL (PRINIVIL,ZESTRIL) 2.5 MG TABLET lisinopriL (PRINIVIL,ZESTRIL) 2.5 MG tablet       Take 1 tablet (2.5 mg total) by mouth once daily.    Take 1 tablet (2.5 mg total) by mouth once daily.    METFORMIN (GLUCOPHAGE) 500 MG TABLET metFORMIN (GLUCOPHAGE) 500 MG tablet       Take 1 tablet (500 mg total) by mouth once daily.    Take 1 tablet (500 mg total) by mouth once daily.    PRAVASTATIN (PRAVACHOL) 80 MG TABLET pravastatin (PRAVACHOL) 40 MG tablet       Take 1 tablet (80 mg total) by mouth every evening.    Take 1 tablet (40 mg total) by mouth every evening.    TRELEGY ELLIPTA 100-62.5-25 MCG DSDV TRELEGY ELLIPTA 100-62.5-25 mcg DsDv       Inhale 1 puff into the lungs once daily.    Inhale 1 puff into the lungs once daily.   Discontinued Medications    PREDNISONE (DELTASONE) 20 MG TABLET    Take 1 tablet (20 mg total) by mouth once daily.        Orders Placed This Encounter   Procedures    Cologuard Screening (Multitarget Stool DNA)    DXA Bone Density Spine And Hip    US Thyroid         Future Appointments   Date Time Provider Department Center   12/13/2022  7:30 AM [facility] [id number] [facility] MAMMO [place]    12/16/2022  8:00 AM NHAN Smith [facility] KATIA [place]   9/18/2023  9:30 AM PROVIDER, [facility] PULMONOLOGY [facility] PULM [place]         Labs thoroughly reviewed with patient. Medication refills addressed today.  RTC prn and 3-4 months, with labs 1 week prior to the apt.  COVID 19 precautions given to patient.  Patient  voices understanding of all discharge instructions.

## 2022-10-27 LAB — NONINV COLON CA DNA+OCC BLD SCRN STL QL: NEGATIVE

## 2022-11-01 ENCOUNTER — DOCUMENTATION ONLY (OUTPATIENT)
Dept: INTERNAL MEDICINE | Facility: CLINIC | Age: 72
End: 2022-11-01
Payer: COMMERCIAL

## 2022-12-13 ENCOUNTER — HOSPITAL ENCOUNTER (OUTPATIENT)
Dept: RADIOLOGY | Facility: HOSPITAL | Age: 72
Discharge: HOME | End: 2022-12-13
Attending: NURSE PRACTITIONER
Payer: COMMERCIAL

## 2022-12-13 DIAGNOSIS — M85.80 OSTEOPENIA AFTER MENOPAUSE: ICD-10-CM

## 2022-12-13 DIAGNOSIS — E04.2 MULTINODULAR THYROID: ICD-10-CM

## 2022-12-13 DIAGNOSIS — I10 PRIMARY HYPERTENSION: ICD-10-CM

## 2022-12-13 DIAGNOSIS — Z78.0 OSTEOPENIA AFTER MENOPAUSE: ICD-10-CM

## 2022-12-13 DIAGNOSIS — I47.10 SUPRAVENTRICULAR TACHYCARDIA: ICD-10-CM

## 2022-12-13 DIAGNOSIS — I50.20 HEART FAILURE WITH REDUCED EJECTION FRACTION: ICD-10-CM

## 2022-12-13 DIAGNOSIS — Z12.31 BREAST CANCER SCREENING BY MAMMOGRAM: ICD-10-CM

## 2022-12-13 PROCEDURE — 77067 SCR MAMMO BI INCL CAD: CPT | Mod: 26,,, | Performed by: RADIOLOGY

## 2022-12-13 PROCEDURE — 76536 US EXAM OF HEAD AND NECK: CPT | Mod: TC

## 2022-12-13 PROCEDURE — 77067 MAMMO DIGITAL SCREENING BILAT WITH TOMO: ICD-10-PCS | Mod: 26,,, | Performed by: RADIOLOGY

## 2022-12-13 PROCEDURE — 77080 DXA BONE DENSITY AXIAL: CPT | Mod: TC

## 2022-12-13 PROCEDURE — 77067 SCR MAMMO BI INCL CAD: CPT | Mod: TC

## 2022-12-13 PROCEDURE — 77063 BREAST TOMOSYNTHESIS BI: CPT | Mod: 26,,, | Performed by: RADIOLOGY

## 2022-12-13 PROCEDURE — 77063 MAMMO DIGITAL SCREENING BILAT WITH TOMO: ICD-10-PCS | Mod: 26,,, | Performed by: RADIOLOGY

## 2022-12-13 RX ORDER — METOPROLOL SUCCINATE 25 MG/1
25 TABLET, EXTENDED RELEASE ORAL 2 TIMES DAILY
Qty: 180 TABLET | Refills: 3 | OUTPATIENT
Start: 2022-12-13 | End: 2023-12-13

## 2022-12-14 ENCOUNTER — TELEPHONE (OUTPATIENT)
Dept: INTERNAL MEDICINE | Facility: CLINIC | Age: 72
End: 2022-12-14
Payer: COMMERCIAL

## 2022-12-14 DIAGNOSIS — E04.2 MULTIPLE THYROID NODULES: Primary | ICD-10-CM

## 2022-12-14 NOTE — TELEPHONE ENCOUNTER
Call pt and inform Bone Density shows improvement in osteopenia of the femurs. Encourage Calcium with vit D OTC 1 tab po BID if not currently taking. Encourage wt bearing exercise such as walking daily. Repeat bone density in 2 years.    Inform Thyroid US results show multiple sub centimeter nodules bilaterally. Radiologist recommends to repeat Thyroid US in 1 year to monitor. I am ordering the US in 1 year and they will call her and schedule it at that time. Keep f/u appt with Harika as scheduled.

## 2022-12-15 NOTE — TELEPHONE ENCOUNTER
Pt verbalized understanding. Pt asked for her appointment be changed back to february 14th with TEE Bryant instead of April 11th. Pt was asked to hold to be transferred to  and pt hung up.

## 2022-12-15 NOTE — PROGRESS NOTES
CHIEF COMPLAINT:   Chief Complaint   Patient presents with    Follow-up     Patient here for 4 mth f/u w ECHO results. Pt c/o sob while walking. Pt c/o palpitations often. Pt denies dizziness, cp and swelling.                   Review of patient's allergies indicates:   Allergen Reactions    Naproxen Hives    Zinc sulfate      Other reaction(s): LATE EFFECT OF BURNS OF OTHER SPECIFIED SITES                                          HPI:  Fadumo Lynch 72 y.o. female with a past medical history of HFrEF (TTE 9/15/20: EF 40%), HTN, HLD, SVT, NIDDM, COPD, and tobacco use presents for follow up and results of Echocardiogram.  Patient completed an Echocardiogram on 10.7.22 which revealed an EF of 55%.  See report below.  She completed an Exercise Nuclear Stress Test on Florina 15, 2021 which revealed no ischemia and no scar.      Patient denies chest pain, orthopnea, PND, peripheral edema, or syncope.  She continues to endorse stable dyspnea on exertion.  She reports occasional palpitations that is improved after she takes her metoprolol succinate.  She continues to smoke approximately a half a pack of cigarettes per day, but states she will try to cut down.  She was referred to the smoking cessation program by her PCP in the past.  She completed a CT scan of the chest and was noted to have lung nodules in which she was referred to pulmonology.  Patient was seen by pulmonology in September 2022 who stated they did not feel the nodules were malignant.      Echocardiogram 10.7.22:  Normal systolic function.  The estimated ejection fraction is 55%.  Normal right ventricular size with normal right ventricular systolic function.  Normal central venous pressure (3 mmHg).  The estimated PA systolic pressure is 12 mmHg.  IVC is normal.     Exercise nuclear stress test Florina 15, 2021:  No ischemia  No scar                                                                                                                                                                                                                                                                         Patient Active Problem List   Diagnosis    Chronic obstructive pulmonary disease    Chronic kidney disease    Hyperlipidemia    Hypertension    Supraventricular tachycardia    Type 2 diabetes mellitus    Tobacco user    Tobacco use    FRASER (dyspnea on exertion)    Heart failure with reduced ejection fraction     Past Surgical History:   Procedure Laterality Date    TONSILLECTOMY AND ADENOIDECTOMY  1964    VAGINAL HYSTERECTOMY  1986     Social History     Socioeconomic History    Marital status:     Number of children: 2   Occupational History    Occupation: Retired   Tobacco Use    Smoking status: Every Day     Packs/day: 0.50     Years: 50.00     Pack years: 25.00     Types: Cigarettes    Smokeless tobacco: Never   Substance and Sexual Activity    Alcohol use: Never    Drug use: Never        Family History   Problem Relation Age of Onset    Colon cancer Mother     Cancer Father          Current Outpatient Medications:     albuterol (PROVENTIL) 2.5 mg /3 mL (0.083 %) nebulizer solution, Take 3 mLs (2.5 mg total) by nebulization 4 (four) times daily as needed for Wheezing or Shortness of Breath., Disp: 360 mL, Rfl: 2    albuterol (PROVENTIL/VENTOLIN HFA) 90 mcg/actuation inhaler, Inhale 1-2 puffs into the lungs every 4 (four) hours as needed for Wheezing or Shortness of Breath., Disp: 18 g, Rfl: 6    aspirin 81 MG Chew, Take 81 mg by mouth., Disp: , Rfl:     diclofenac sodium (VOLTAREN) 1 % Gel, Apply 2 g topically 4 (four) times daily as needed (hand pain)., Disp: 100 g, Rfl: 2    lisinopriL (PRINIVIL,ZESTRIL) 2.5 MG tablet, Take 1 tablet (2.5 mg total) by mouth once daily., Disp: 90 tablet, Rfl: 1    metFORMIN (GLUCOPHAGE) 500 MG tablet, Take 1 tablet (500 mg total) by mouth once daily., Disp: 90 tablet, Rfl: 1    metoprolol succinate (TOPROL-XL) 25 MG 24 hr tablet, Take 1  "tablet (25 mg total) by mouth once daily. (Patient taking differently: Take 25 mg by mouth 2 (two) times daily.), Disp: 90 tablet, Rfl: 3    metoprolol succinate (TOPROL-XL) 50 MG 24 hr tablet, Take 1 tablet (50 mg total) by mouth once daily. (Patient taking differently: Take 50 mg by mouth 2 (two) times daily.), Disp: 90 tablet, Rfl: 3    nitroGLYCERIN (NITROSTAT) 0.4 MG SL tablet, Place 0.4 mg under the tongue., Disp: , Rfl:     pravastatin (PRAVACHOL) 80 MG tablet, Take 1 tablet (80 mg total) by mouth every evening., Disp: 90 tablet, Rfl: 1    TRELEGY ELLIPTA 100-62.5-25 mcg DsDv, Inhale 1 puff into the lungs once daily., Disp: 1 each, Rfl: 6    vitamin D (VITAMIN D3) 1000 units Tab, Take by mouth., Disp: , Rfl:      ROS:                                                                                                                                                                             Review of Systems   Constitutional: Negative.    Respiratory:  Positive for shortness of breath.    Cardiovascular:  Positive for palpitations.   Gastrointestinal: Negative.    Musculoskeletal: Negative.    Skin: Negative.    Neurological: Negative.    Psychiatric/Behavioral: Negative.        Blood pressure 125/75, pulse 64, temperature 98.4 °F (36.9 °C), temperature source Oral, resp. rate 16, height 5' 2" (1.575 m), weight 72.9 kg (160 lb 12.8 oz), SpO2 96 %.   PE:  Physical Exam  Constitutional:       Appearance: Normal appearance.   HENT:      Head: Normocephalic and atraumatic.   Eyes:      Extraocular Movements: Extraocular movements intact.      Pupils: Pupils are equal, round, and reactive to light.   Cardiovascular:      Rate and Rhythm: Normal rate and regular rhythm.   Pulmonary:      Effort: Pulmonary effort is normal.   Abdominal:      Palpations: Abdomen is soft.   Musculoskeletal:         General: Normal range of motion.      Cervical back: Normal range of motion.   Skin:     General: Skin is warm and dry. "   Neurological:      General: No focal deficit present.      Mental Status: She is alert and oriented to person, place, and time.   Psychiatric:         Mood and Affect: Mood normal.         Behavior: Behavior normal.     ASSESSMENT/PLAN:  Heart failure with reduced ejection fraction - NYHA Class II  EF 55% per Echo 10.7.22  TTE 9/15/20: EF 40%  EF 55% in 2014  Exercise Nuclear Stress Test 6.15.21: No ischemia   Endorses stable FRASER  Continue GDMT with Toprol XL and Lisinopril  Counseled on low salt diet - max 2g daily    Chest Pain- resolved  Exercise Nuclear Stress Test 6.15.21: No ischemia   Continue Aspirin 81mg daily and SL Nitro prn CP    HTN (hypertension)  BP at goal - 125/75  Continue Toprol XL and Lisinopril   Counseled on low salt diet    SVT (supraventricular tachycardia)   Continue Toprol XL 75mg daily    DM2 (type 2 diabetes mellitus)  Management per PCP    Hyperlipidemia   LDL not at goal - 81  Continue Pravastatin 80mg daily - recently increased to 80mg by PCP  She will repeat FLP with PCP  Counseled on low cholesterol diet    COPD  Management per PCP  Strongly encouraged smoking cessation    CKD  Management per Nephrology    Tobacco Use   Continues to smoke about a half a pack of cigarettes per day - states she will try to cut down  Counseled on smoking cessation - referred to the smoking cessation by her PCP in the past    Follow up in Cardiology Clinic in 6 months   Follow-up with PCP as directed

## 2022-12-16 ENCOUNTER — OFFICE VISIT (OUTPATIENT)
Dept: CARDIOLOGY | Facility: CLINIC | Age: 72
End: 2022-12-16
Payer: COMMERCIAL

## 2022-12-16 VITALS
WEIGHT: 160.81 LBS | RESPIRATION RATE: 16 BRPM | SYSTOLIC BLOOD PRESSURE: 125 MMHG | TEMPERATURE: 98 F | DIASTOLIC BLOOD PRESSURE: 75 MMHG | HEIGHT: 62 IN | HEART RATE: 64 BPM | BODY MASS INDEX: 29.59 KG/M2 | OXYGEN SATURATION: 96 %

## 2022-12-16 DIAGNOSIS — E78.2 MIXED HYPERLIPIDEMIA: ICD-10-CM

## 2022-12-16 DIAGNOSIS — I10 PRIMARY HYPERTENSION: ICD-10-CM

## 2022-12-16 DIAGNOSIS — Z72.0 TOBACCO USE: ICD-10-CM

## 2022-12-16 DIAGNOSIS — I50.20 HEART FAILURE WITH REDUCED EJECTION FRACTION: ICD-10-CM

## 2022-12-16 DIAGNOSIS — R06.09 DOE (DYSPNEA ON EXERTION): Primary | ICD-10-CM

## 2022-12-16 DIAGNOSIS — J44.9 CHRONIC OBSTRUCTIVE PULMONARY DISEASE, UNSPECIFIED COPD TYPE: ICD-10-CM

## 2022-12-16 PROCEDURE — 1159F PR MEDICATION LIST DOCUMENTED IN MEDICAL RECORD: ICD-10-PCS | Mod: CPTII,,, | Performed by: NURSE PRACTITIONER

## 2022-12-16 PROCEDURE — 3078F PR MOST RECENT DIASTOLIC BLOOD PRESSURE < 80 MM HG: ICD-10-PCS | Mod: CPTII,,, | Performed by: NURSE PRACTITIONER

## 2022-12-16 PROCEDURE — 1126F AMNT PAIN NOTED NONE PRSNT: CPT | Mod: CPTII,,, | Performed by: NURSE PRACTITIONER

## 2022-12-16 PROCEDURE — 1101F PR PT FALLS ASSESS DOC 0-1 FALLS W/OUT INJ PAST YR: ICD-10-PCS | Mod: CPTII,,, | Performed by: NURSE PRACTITIONER

## 2022-12-16 PROCEDURE — 3288F FALL RISK ASSESSMENT DOCD: CPT | Mod: CPTII,,, | Performed by: NURSE PRACTITIONER

## 2022-12-16 PROCEDURE — 4010F PR ACE/ARB THEARPY RXD/TAKEN: ICD-10-PCS | Mod: CPTII,,, | Performed by: NURSE PRACTITIONER

## 2022-12-16 PROCEDURE — 3008F BODY MASS INDEX DOCD: CPT | Mod: CPTII,,, | Performed by: NURSE PRACTITIONER

## 2022-12-16 PROCEDURE — 3066F PR DOCUMENTATION OF TREATMENT FOR NEPHROPATHY: ICD-10-PCS | Mod: CPTII,,, | Performed by: NURSE PRACTITIONER

## 2022-12-16 PROCEDURE — 99215 OFFICE O/P EST HI 40 MIN: CPT | Mod: PBBFAC | Performed by: NURSE PRACTITIONER

## 2022-12-16 PROCEDURE — 3074F SYST BP LT 130 MM HG: CPT | Mod: CPTII,,, | Performed by: NURSE PRACTITIONER

## 2022-12-16 PROCEDURE — 3288F PR FALLS RISK ASSESSMENT DOCUMENTED: ICD-10-PCS | Mod: CPTII,,, | Performed by: NURSE PRACTITIONER

## 2022-12-16 PROCEDURE — 1160F PR REVIEW ALL MEDS BY PRESCRIBER/CLIN PHARMACIST DOCUMENTED: ICD-10-PCS | Mod: CPTII,,, | Performed by: NURSE PRACTITIONER

## 2022-12-16 PROCEDURE — 1160F RVW MEDS BY RX/DR IN RCRD: CPT | Mod: CPTII,,, | Performed by: NURSE PRACTITIONER

## 2022-12-16 PROCEDURE — 3066F NEPHROPATHY DOC TX: CPT | Mod: CPTII,,, | Performed by: NURSE PRACTITIONER

## 2022-12-16 PROCEDURE — 3061F PR NEG MICROALBUMINURIA RESULT DOCUMENTED/REVIEW: ICD-10-PCS | Mod: CPTII,,, | Performed by: NURSE PRACTITIONER

## 2022-12-16 PROCEDURE — 4010F ACE/ARB THERAPY RXD/TAKEN: CPT | Mod: CPTII,,, | Performed by: NURSE PRACTITIONER

## 2022-12-16 PROCEDURE — 3074F PR MOST RECENT SYSTOLIC BLOOD PRESSURE < 130 MM HG: ICD-10-PCS | Mod: CPTII,,, | Performed by: NURSE PRACTITIONER

## 2022-12-16 PROCEDURE — 99213 OFFICE O/P EST LOW 20 MIN: CPT | Mod: S$PBB,,, | Performed by: NURSE PRACTITIONER

## 2022-12-16 PROCEDURE — 1126F PR PAIN SEVERITY QUANTIFIED, NO PAIN PRESENT: ICD-10-PCS | Mod: CPTII,,, | Performed by: NURSE PRACTITIONER

## 2022-12-16 PROCEDURE — 1101F PT FALLS ASSESS-DOCD LE1/YR: CPT | Mod: CPTII,,, | Performed by: NURSE PRACTITIONER

## 2022-12-16 PROCEDURE — 3061F NEG MICROALBUMINURIA REV: CPT | Mod: CPTII,,, | Performed by: NURSE PRACTITIONER

## 2022-12-16 PROCEDURE — 3008F PR BODY MASS INDEX (BMI) DOCUMENTED: ICD-10-PCS | Mod: CPTII,,, | Performed by: NURSE PRACTITIONER

## 2022-12-16 PROCEDURE — 1159F MED LIST DOCD IN RCRD: CPT | Mod: CPTII,,, | Performed by: NURSE PRACTITIONER

## 2022-12-16 PROCEDURE — 3078F DIAST BP <80 MM HG: CPT | Mod: CPTII,,, | Performed by: NURSE PRACTITIONER

## 2022-12-16 PROCEDURE — 99213 PR OFFICE/OUTPT VISIT, EST, LEVL III, 20-29 MIN: ICD-10-PCS | Mod: S$PBB,,, | Performed by: NURSE PRACTITIONER

## 2022-12-16 RX ORDER — PRAVASTATIN SODIUM 80 MG/1
80 TABLET ORAL NIGHTLY
Qty: 90 TABLET | Refills: 3 | Status: SHIPPED | OUTPATIENT
Start: 2022-12-16 | End: 2023-05-02 | Stop reason: SDUPTHER

## 2022-12-16 RX ORDER — LISINOPRIL 2.5 MG/1
2.5 TABLET ORAL DAILY
Qty: 90 TABLET | Refills: 3 | Status: SHIPPED | OUTPATIENT
Start: 2022-12-16 | End: 2023-05-02

## 2022-12-19 ENCOUNTER — OFFICE VISIT (OUTPATIENT)
Dept: URGENT CARE | Facility: CLINIC | Age: 72
End: 2022-12-19
Payer: COMMERCIAL

## 2022-12-19 VITALS
SYSTOLIC BLOOD PRESSURE: 147 MMHG | OXYGEN SATURATION: 95 % | WEIGHT: 160.19 LBS | DIASTOLIC BLOOD PRESSURE: 77 MMHG | HEIGHT: 62 IN | RESPIRATION RATE: 18 BRPM | BODY MASS INDEX: 29.48 KG/M2 | TEMPERATURE: 98 F | HEART RATE: 76 BPM

## 2022-12-19 DIAGNOSIS — Z11.52 ENCOUNTER FOR SCREENING FOR SEVERE ACUTE RESPIRATORY SYNDROME CORONAVIRUS 2 (SARS-COV-2) INFECTION: ICD-10-CM

## 2022-12-19 DIAGNOSIS — J02.9 SORE THROAT: ICD-10-CM

## 2022-12-19 DIAGNOSIS — J20.9 ACUTE BRONCHITIS, UNSPECIFIED ORGANISM: Primary | ICD-10-CM

## 2022-12-19 DIAGNOSIS — R68.89 FLU-LIKE SYMPTOMS: ICD-10-CM

## 2022-12-19 LAB
CTP QC/QA: YES
CTP QC/QA: YES
FLUAV AG NPH QL: NEGATIVE
FLUBV AG NPH QL: NEGATIVE
SARS-COV-2 RDRP RESP QL NAA+PROBE: NEGATIVE
STREP A PCR (OHS): NOT DETECTED

## 2022-12-19 PROCEDURE — 87804 INFLUENZA ASSAY W/OPTIC: CPT | Mod: 59,PBBFAC | Performed by: NURSE PRACTITIONER

## 2022-12-19 PROCEDURE — 99214 PR OFFICE/OUTPT VISIT, EST, LEVL IV, 30-39 MIN: ICD-10-PCS | Mod: S$PBB,,, | Performed by: NURSE PRACTITIONER

## 2022-12-19 PROCEDURE — 87635 SARS-COV-2 COVID-19 AMP PRB: CPT | Mod: PBBFAC | Performed by: NURSE PRACTITIONER

## 2022-12-19 PROCEDURE — 99215 OFFICE O/P EST HI 40 MIN: CPT | Mod: PBBFAC | Performed by: NURSE PRACTITIONER

## 2022-12-19 PROCEDURE — 99214 OFFICE O/P EST MOD 30 MIN: CPT | Mod: S$PBB,,, | Performed by: NURSE PRACTITIONER

## 2022-12-19 PROCEDURE — 87651 STREP A DNA AMP PROBE: CPT | Performed by: NURSE PRACTITIONER

## 2022-12-19 RX ORDER — AZITHROMYCIN 250 MG/1
TABLET, FILM COATED ORAL
Qty: 6 TABLET | Refills: 0 | Status: SHIPPED | OUTPATIENT
Start: 2022-12-19 | End: 2022-12-26 | Stop reason: SDUPTHER

## 2022-12-19 RX ORDER — METHYLPREDNISOLONE 4 MG/1
TABLET ORAL
Qty: 21 TABLET | Refills: 0 | Status: SHIPPED | OUTPATIENT
Start: 2022-12-19 | End: 2022-12-25

## 2022-12-19 RX ORDER — MONTELUKAST SODIUM 10 MG/1
10 TABLET ORAL NIGHTLY
Qty: 30 TABLET | Refills: 0 | Status: SHIPPED | OUTPATIENT
Start: 2022-12-19 | End: 2023-01-18

## 2022-12-19 NOTE — PROGRESS NOTES
"Subjective:       Patient ID: Fadumo Lynch is a 72 y.o. female.    Vitals:  height is 5' 2" (1.575 m) and weight is 72.7 kg (160 lb 3.2 oz). Her oral temperature is 98.3 °F (36.8 °C). Her blood pressure is 147/77 (abnormal) and her pulse is 76. Her respiration is 18 and oxygen saturation is 95%.     Chief Complaint: Sore Throat (xToday), Headache (xLast night), Cough (xLast night), and Nasal Congestion (xLast night)    CC as above.  Pmh copd, states she does have maintenance and rescue inhalers at home.       Constitution: Negative.   HENT:  Positive for congestion and sore throat.    Neck: neck negative.   Cardiovascular: Negative.    Respiratory:  Positive for cough.    Neurological:  Positive for headaches.     Objective:      Physical Exam   Constitutional: She is oriented to person, place, and time. She appears well-developed.   HENT:   Head: Normocephalic.   Ears:   Right Ear: Tympanic membrane normal.   Left Ear: Tympanic membrane normal.   Nose: Congestion present.   Mouth/Throat: Oropharynx is clear.   Eyes: Conjunctivae and EOM are normal. Pupils are equal, round, and reactive to light.   Neck: Neck supple.   Cardiovascular: Normal rate, regular rhythm and normal heart sounds.   Pulmonary/Chest: Effort normal and breath sounds normal.   Musculoskeletal: Normal range of motion.         General: Normal range of motion.   Neurological: She is alert and oriented to person, place, and time.   Skin: Skin is warm and dry.   Psychiatric: Her behavior is normal.   Vitals reviewed.      Assessment:       1. Acute bronchitis, unspecified organism    2. Encounter for screening for severe acute respiratory syndrome coronavirus 2 (SARS-CoV-2) infection    3. Flu-like symptoms    4. Sore throat              Office Visit on 12/19/2022   Component Date Value Ref Range Status    POC Rapid COVID 12/19/2022 Negative  Negative Final     Acceptable 12/19/2022 Yes   Final    Rapid Influenza A Ag 12/19/2022 " Negative  Negative Final    Rapid Influenza B Ag 12/19/2022 Negative  Negative Final     Acceptable 12/19/2022 Yes   Final              Plan:         Medication as ordered. May use humidifier.  If any shortness of breath, wheezing, continued fevers or any new symptoms then immediately go to ER.      Acute bronchitis, unspecified organism  -     methylPREDNISolone (MEDROL DOSEPACK) 4 mg tablet; Take 6 tablets (24 mg total) by mouth once daily for 1 day, THEN 5 tablets (20 mg total) once daily for 1 day, THEN 4 tablets (16 mg total) once daily for 1 day, THEN 3 tablets (12 mg total) once daily for 1 day, THEN 2 tablets (8 mg total) once daily for 1 day, THEN 1 tablet (4 mg total) once daily for 1 day. use as directed.  Dispense: 21 tablet; Refill: 0  -     azithromycin (ZITHROMAX Z-CUONG) 250 MG tablet; Take 2 tablets (500 mg) on  Day 1,  followed by 1 tablet (250 mg) once daily on Days 2 through 5.  Dispense: 6 tablet; Refill: 0  -     montelukast (SINGULAIR) 10 mg tablet; Take 1 tablet (10 mg total) by mouth every evening.  Dispense: 30 tablet; Refill: 0    Encounter for screening for severe acute respiratory syndrome coronavirus 2 (SARS-CoV-2) infection  -     POCT COVID-19 Rapid Screening    Flu-like symptoms  -     POCT Influenza A/B    Sore throat  -     Strep Group A by PCR; Future; Expected date: 12/19/2022

## 2022-12-20 ENCOUNTER — TELEPHONE (OUTPATIENT)
Dept: CARDIOLOGY | Facility: CLINIC | Age: 72
End: 2022-12-20
Payer: COMMERCIAL

## 2022-12-20 NOTE — TELEPHONE ENCOUNTER
"Spoke with pt and she stated on 12/18/2022 she was "relaxing on the recliner" when her heart started "beating fast." Pt stated she "think" her pulse got up to "almost 150." Pt stated she took metoprolol succinate 25 mg at that time. Pt stated after a few minutes the palpitations subsided. Pt denies chest pain, dizziness, swelling, or headaches. Pt reports sob related to her "COPD." Informed pt that the provider is in clinic and it may take up to 24 hours for a response. Instructed pt on strict ED precautions. Pt verbalized and acknowledged understanding.   "

## 2022-12-20 NOTE — TELEPHONE ENCOUNTER
Spoke with patient via telephone conversation. Reiterated instructions on keep a Blood pressure and heart rate log, calling clinic with readings in 2 weeks, report to the ER if symptoms warrant. Patient agreeable with plan of care.    ----- Message from NHAN Smith sent at 12/19/2022  3:06 PM CST -----  Noted.  I agree with the verbal instructions you gave patient to keep home BP/HR log and call clinic for any elevated readings.  Thanks so much.      ----- Message -----  From: Danielle Dewey RN  Sent: 12/19/2022   2:28 PM CST  To: NHAN Smith    Spoke with patient via telephone conversation. Patient reports a single episode of elevated HR. She states she took her medication as directed (75 mg metoprolol succinate once daily ) at 6 am. Later that evening around 10:30 pm she felt her heart racing. She took an additional dose of metoprolol succ 50 mg. She states she knew she was going to end up in the ER if she hadn't taken the additional dose. She has not had any additional episodes. She reports that her medication was decreased recently. She also reported to the urgent care today for treatment of a sore throat. She states she was tested for Covid and flu. Both negative and a strep culture was sent to lab. She is currently being treated with antibiotics. Patient is on wait list for a cardiology follow up appointment in June. Last clinic visit on 12/16/22. Please advise.   ----- Message -----  From: Blayne Johnson MA  Sent: 12/19/2022   9:53 AM CST  To: [facility] Cardiology Clinical Support Staff    Good morning!    Pt call states she is taking her metoprolol 75 mg daily. Yesterday 12/18/22, she took her medicine at about 6 am. At about 1030 pm her heart was racing. She states she has a hx of SVT.

## 2022-12-26 ENCOUNTER — HOSPITAL ENCOUNTER (EMERGENCY)
Facility: HOSPITAL | Age: 72
Discharge: HOME | End: 2022-12-26
Attending: STUDENT IN AN ORGANIZED HEALTH CARE EDUCATION/TRAINING PROGRAM
Payer: COMMERCIAL

## 2022-12-26 VITALS
SYSTOLIC BLOOD PRESSURE: 108 MMHG | HEART RATE: 84 BPM | WEIGHT: 158.94 LBS | OXYGEN SATURATION: 96 % | HEIGHT: 62 IN | BODY MASS INDEX: 29.25 KG/M2 | RESPIRATION RATE: 18 BRPM | TEMPERATURE: 98 F | DIASTOLIC BLOOD PRESSURE: 54 MMHG

## 2022-12-26 DIAGNOSIS — J41.1 MUCOPURULENT CHRONIC BRONCHITIS: ICD-10-CM

## 2022-12-26 DIAGNOSIS — J20.9 ACUTE BRONCHITIS, UNSPECIFIED ORGANISM: ICD-10-CM

## 2022-12-26 DIAGNOSIS — Z76.0 MEDICATION REFILL: ICD-10-CM

## 2022-12-26 DIAGNOSIS — I47.10 SUPRAVENTRICULAR TACHYCARDIA: ICD-10-CM

## 2022-12-26 DIAGNOSIS — I47.10 SVT (SUPRAVENTRICULAR TACHYCARDIA): Primary | ICD-10-CM

## 2022-12-26 DIAGNOSIS — J44.9 CHRONIC OBSTRUCTIVE PULMONARY DISEASE, UNSPECIFIED COPD TYPE: ICD-10-CM

## 2022-12-26 DIAGNOSIS — I10 PRIMARY HYPERTENSION: ICD-10-CM

## 2022-12-26 DIAGNOSIS — I50.20 HEART FAILURE WITH REDUCED EJECTION FRACTION: ICD-10-CM

## 2022-12-26 LAB
ALBUMIN SERPL-MCNC: 3.9 G/DL (ref 3.4–4.8)
ALBUMIN/GLOB SERPL: 0.8 RATIO (ref 1.1–2)
ALP SERPL-CCNC: 122 UNIT/L (ref 40–150)
ALT SERPL-CCNC: 30 UNIT/L (ref 0–55)
AST SERPL-CCNC: 25 UNIT/L (ref 5–34)
BASOPHILS # BLD AUTO: 0.07 X10(3)/MCL (ref 0–0.2)
BASOPHILS NFR BLD AUTO: 0.4 %
BILIRUBIN DIRECT+TOT PNL SERPL-MCNC: 0.4 MG/DL
BUN SERPL-MCNC: 14.1 MG/DL (ref 9.8–20.1)
CALCIUM SERPL-MCNC: 9.7 MG/DL (ref 8.4–10.2)
CHLORIDE SERPL-SCNC: 98 MMOL/L (ref 98–107)
CO2 SERPL-SCNC: 28 MMOL/L (ref 23–31)
CREAT SERPL-MCNC: 0.93 MG/DL (ref 0.55–1.02)
EOSINOPHIL # BLD AUTO: 0.23 X10(3)/MCL (ref 0–0.9)
EOSINOPHIL NFR BLD AUTO: 1.4 %
ERYTHROCYTE [DISTWIDTH] IN BLOOD BY AUTOMATED COUNT: 12.9 % (ref 11–14.5)
FLUAV AG UPPER RESP QL IA.RAPID: NOT DETECTED
FLUBV AG UPPER RESP QL IA.RAPID: NOT DETECTED
GFR SERPLBLD CREATININE-BSD FMLA CKD-EPI: >60 MLS/MIN/1.73/M2
GLOBULIN SER-MCNC: 4.8 GM/DL (ref 2.4–3.5)
GLUCOSE SERPL-MCNC: 123 MG/DL (ref 82–115)
HCT VFR BLD AUTO: 46.8 % (ref 37–47)
HGB BLD-MCNC: 15.4 GM/DL (ref 12–16)
IMM GRANULOCYTES # BLD AUTO: 0.11 X10(3)/MCL (ref 0–0.04)
IMM GRANULOCYTES NFR BLD AUTO: 0.7 %
LYMPHOCYTES # BLD AUTO: 4.12 X10(3)/MCL (ref 0.6–4.6)
LYMPHOCYTES NFR BLD AUTO: 25.2 %
MAGNESIUM SERPL-MCNC: 2.1 MG/DL (ref 1.6–2.6)
MCH RBC QN AUTO: 30.6 PG
MCHC RBC AUTO-ENTMCNC: 32.9 MG/DL (ref 33–36)
MCV RBC AUTO: 92.9 FL (ref 80–94)
MONOCYTES # BLD AUTO: 0.92 X10(3)/MCL (ref 0.1–1.3)
MONOCYTES NFR BLD AUTO: 5.6 %
NEUTROPHILS # BLD AUTO: 10.9 X10(3)/MCL (ref 2.1–9.2)
NEUTROPHILS NFR BLD AUTO: 66.7 %
NRBC BLD AUTO-RTO: 0 % (ref 0–1)
PHOSPHATE SERPL-MCNC: 3.3 MG/DL (ref 2.3–4.7)
PLATELET # BLD AUTO: 370 X10(3)/MCL (ref 140–371)
PMV BLD AUTO: 9.4 FL (ref 9.4–12.4)
POTASSIUM SERPL-SCNC: 4.2 MMOL/L (ref 3.5–5.1)
PROT SERPL-MCNC: 8.7 GM/DL (ref 5.8–7.6)
RBC # BLD AUTO: 5.04 X10(6)/MCL (ref 4.2–5.4)
RSV A 5' UTR RNA NPH QL NAA+PROBE: NOT DETECTED
SARS-COV-2 RNA RESP QL NAA+PROBE: NOT DETECTED
SODIUM SERPL-SCNC: 135 MMOL/L (ref 136–145)
TSH SERPL-ACNC: 0.73 UIU/ML (ref 0.35–4.94)
WBC # SPEC AUTO: 16.4 X10(3)/MCL (ref 4.5–11.5)

## 2022-12-26 PROCEDURE — 84100 ASSAY OF PHOSPHORUS: CPT | Performed by: STUDENT IN AN ORGANIZED HEALTH CARE EDUCATION/TRAINING PROGRAM

## 2022-12-26 PROCEDURE — 83735 ASSAY OF MAGNESIUM: CPT | Performed by: STUDENT IN AN ORGANIZED HEALTH CARE EDUCATION/TRAINING PROGRAM

## 2022-12-26 PROCEDURE — 84443 ASSAY THYROID STIM HORMONE: CPT | Performed by: STUDENT IN AN ORGANIZED HEALTH CARE EDUCATION/TRAINING PROGRAM

## 2022-12-26 PROCEDURE — 85025 COMPLETE CBC W/AUTO DIFF WBC: CPT | Performed by: STUDENT IN AN ORGANIZED HEALTH CARE EDUCATION/TRAINING PROGRAM

## 2022-12-26 PROCEDURE — 63600175 PHARM REV CODE 636 W HCPCS

## 2022-12-26 PROCEDURE — 93005 ELECTROCARDIOGRAM TRACING: CPT

## 2022-12-26 PROCEDURE — 0241U COVID/RSV/FLU A&B PCR: CPT | Performed by: STUDENT IN AN ORGANIZED HEALTH CARE EDUCATION/TRAINING PROGRAM

## 2022-12-26 PROCEDURE — 99285 EMERGENCY DEPT VISIT HI MDM: CPT | Mod: 25

## 2022-12-26 PROCEDURE — 80053 COMPREHEN METABOLIC PANEL: CPT | Performed by: STUDENT IN AN ORGANIZED HEALTH CARE EDUCATION/TRAINING PROGRAM

## 2022-12-26 PROCEDURE — 96374 THER/PROPH/DIAG INJ IV PUSH: CPT

## 2022-12-26 RX ORDER — ADENOSINE 3 MG/ML
INJECTION, SOLUTION INTRAVENOUS
Status: COMPLETED
Start: 2022-12-26 | End: 2022-12-26

## 2022-12-26 RX ORDER — METOPROLOL SUCCINATE 50 MG/1
150 TABLET, EXTENDED RELEASE ORAL 2 TIMES DAILY
Qty: 180 TABLET | Refills: 0 | Status: SHIPPED | OUTPATIENT
Start: 2022-12-26 | End: 2022-12-29

## 2022-12-26 RX ORDER — ADENOSINE 3 MG/ML
6 INJECTION, SOLUTION INTRAVENOUS
Status: COMPLETED | OUTPATIENT
Start: 2022-12-26 | End: 2022-12-26

## 2022-12-26 RX ORDER — ALBUTEROL SULFATE 90 UG/1
1-2 AEROSOL, METERED RESPIRATORY (INHALATION) EVERY 4 HOURS PRN
Qty: 18 G | Refills: 0 | Status: SHIPPED | OUTPATIENT
Start: 2022-12-26 | End: 2023-04-11 | Stop reason: SDUPTHER

## 2022-12-26 RX ORDER — AZITHROMYCIN 250 MG/1
TABLET, FILM COATED ORAL
Qty: 6 TABLET | Refills: 0 | Status: SHIPPED | OUTPATIENT
Start: 2022-12-26 | End: 2023-04-11

## 2022-12-26 RX ADMIN — ADENOSINE 6 MG: 3 INJECTION, SOLUTION INTRAVENOUS at 10:12

## 2022-12-26 NOTE — ED PROVIDER NOTES
"Encounter Date: 12/26/2022       History     Chief Complaint   Patient presents with    Tachycardia     Hx SVT, reports chest pain, SOB, increased heart rate x approx 30 minutes. Unable to convert w/ at home methods per patient     72-year-old female presents to ED for tachycardia.  Reports history of "SVT for decades".  Attempted home vagal techniques without relief.  States her heart rate has been consistently greater than 170 bpm.  Reports associated mild shortness of breath and palpitations.  To me denies any chest pain or pressure.  Denies any lightheadedness or syncope.  No recent fever chills cough congestion.  States her PCP recently dropped her metoprolol from 150 daily to 75.  States once this SVT episode resolves with adenosine she will go immediately to her cardiologist appointment tomorrow to discuss with them correcting her BP meds.  She denies any other symptoms or complaints at this time.  States this feels very typical of her SVT.    Review of patient's allergies indicates:   Allergen Reactions    Medrol [methylprednisolone] Itching    Naproxen Hives    Zinc sulfate      Other reaction(s): LATE EFFECT OF BURNS OF OTHER SPECIFIED SITES     Past Medical History:   Diagnosis Date    CKD (chronic kidney disease)     COPD (chronic obstructive pulmonary disease)     Diabetes mellitus     Hyperlipidemia     Hypertension     Supraventricular tachycardia      Past Surgical History:   Procedure Laterality Date    TONSILLECTOMY AND ADENOIDECTOMY  1964    VAGINAL HYSTERECTOMY  1986     Family History   Problem Relation Age of Onset    Colon cancer Mother     Cancer Father      Social History     Tobacco Use    Smoking status: Every Day     Packs/day: 0.50     Years: 50.00     Pack years: 25.00     Types: Cigarettes    Smokeless tobacco: Never   Substance Use Topics    Alcohol use: Never    Drug use: Never     Review of Systems   Constitutional:  Negative for chills, diaphoresis and fever. "   HENT:  Negative for congestion, rhinorrhea, sinus pain and sore throat.    Eyes:  Negative for pain, discharge and itching.   Respiratory:  Positive for cough and shortness of breath. Negative for chest tightness.    Cardiovascular:  Positive for palpitations. Negative for chest pain.   Gastrointestinal:  Negative for abdominal pain, nausea and vomiting.   Genitourinary:  Negative for dysuria, flank pain and hematuria.   Musculoskeletal:  Negative for back pain and myalgias.   Skin:  Negative for color change and rash.   Neurological:  Negative for dizziness, weakness and headaches.   Psychiatric/Behavioral:  Negative for confusion. The patient is not hyperactive.      Physical Exam     Initial Vitals [12/26/22 1020]   BP Pulse Resp Temp SpO2   99/75 (!) 184 20 98.2 °F (36.8 °C) 97 %      MAP       --         Physical Exam    Vitals reviewed.  Constitutional: She appears well-developed and well-nourished. She is not diaphoretic. No distress.   HENT:   Head: Normocephalic and atraumatic.   Eyes: Conjunctivae and EOM are normal. Pupils are equal, round, and reactive to light.   Neck: Neck supple. No tracheal deviation present.   Normal range of motion.  Cardiovascular:  Regular rhythm, normal heart sounds and intact distal pulses.   Tachycardia present.         Pulmonary/Chest: Breath sounds normal. No respiratory distress.   Abdominal: Abdomen is soft. There is no abdominal tenderness. There is no rebound and no guarding.   Musculoskeletal:         General: Normal range of motion.      Cervical back: Normal range of motion and neck supple.     Neurological: She is alert and oriented to person, place, and time. She has normal strength. GCS score is 15. GCS eye subscore is 4. GCS verbal subscore is 5. GCS motor subscore is 6.   Skin: Skin is warm and dry. Capillary refill takes less than 2 seconds. No rash noted.   Psychiatric: She has a normal mood and affect. Her behavior is normal. Judgment and thought content  normal.       ED Course   Critical Care    Date/Time: 12/26/2022 12:05 AM  Performed by: Bulmaro Eldridge MD  Authorized by: Bulmaro Eldridge MD   Total critical care time (exclusive of procedural time) : 45 minutes  Critical care time was exclusive of separately billable procedures and treating other patients.  Critical care was necessary to treat or prevent imminent or life-threatening deterioration of the following conditions: cardiac failure.  Critical care was time spent personally by me on the following activities: evaluation of patient's response to treatment, obtaining history from patient or surrogate, ordering and review of laboratory studies, pulse oximetry, review of old charts, development of treatment plan with patient or surrogate, examination of patient, ordering and performing treatments and interventions, ordering and review of radiographic studies and re-evaluation of patient's condition.  Comments: SVT requiring chemical cardioversion & monitoring      Labs Reviewed   COMPREHENSIVE METABOLIC PANEL - Abnormal; Notable for the following components:       Result Value    Sodium Level 135 (*)     Glucose Level 123 (*)     Protein Total 8.7 (*)     Globulin 4.8 (*)     Albumin/Globulin Ratio 0.8 (*)     All other components within normal limits   CBC WITH DIFFERENTIAL - Abnormal; Notable for the following components:    WBC 16.4 (*)     MCHC 32.9 (*)     Neut # 10.90 (*)     IG# 0.11 (*)     All other components within normal limits   TSH - Normal   MAGNESIUM - Normal   PHOSPHORUS - Normal   COVID/RSV/FLU A&B PCR - Normal    Narrative:     The Xpert Xpress SARS-CoV-2/FLU/RSV plus is a rapid, multiplexed real-time PCR test intended for the simultaneous qualitative detection and differentiation of SARS-CoV-2, Influenza A, Influenza B, and respiratory syncytial virus (RSV) viral RNA in either nasopharyngeal swab or nasal swab specimens.         CBC W/ AUTO DIFFERENTIAL    Narrative:     The following orders  were created for panel order CBC auto differential.  Procedure                               Abnormality         Status                     ---------                               -----------         ------                     CBC with Differential[[id number]]        Abnormal            Final result                 Please view results for these tests on the individual orders.     EKG Readings: (Independently Interpreted)   Initial Reading: No STEMI. Rhythm: Normal Sinus Rhythm. Ectopy: No Ectopy. Conduction: Normal. Axis: Normal. Clinical Impression: Normal Sinus Rhythm   ECG Results              EKG 12-lead (Final result)  Result time 12/28/22 16:43:37      Final result by Interface, Lab In [facility] (12/28/22 16:43:37)                   Narrative:    Test Reason : R07.9,    Vent. Rate : 087 BPM     Atrial Rate : 087 BPM     P-R Int : 126 ms          QRS Dur : 072 ms      QT Int : 368 ms       P-R-T Axes : 067 068 054 degrees     QTc Int : 442 ms    Normal sinus rhythm  Normal ECG    Confirmed by Cat Morin MD ([id number]) on 12/28/2022 4:43:30 PM    Referred By: BREEZY   SELF           Confirmed By:Cat Morin MD                                  Imaging Results              X-Ray Chest AP Portable (Final result)  Result time 12/26/22 11:19:32      Final result by Russ Pedroza MD (12/26/22 11:19:32)                   Impression:      NO ACUTE CARDIOPULMONARY PROCESS IDENTIFIED.      Electronically signed by: Russ Pedroza  Date:    12/26/2022  Time:    11:19               Narrative:    EXAMINATION:  XR CHEST AP PORTABLE    CLINICAL HISTORY:  Chest Pain;    TECHNIQUE:  One view    COMPARISON:  July 11, 2022.    FINDINGS:  Cardiopericardial silhouette is within normal limits. Lungs are without dense focal or segmental consolidation, congestive process, pleural effusions or pneumothorax.                                       Medications   adenosine injection 6 mg (6 mg Intravenous Given 12/26/22 1024)     Medical  Decision Making:   History:   Old Records Summarized: records from previous admission(s).  Clinical Tests:   Lab Tests: Reviewed and Ordered  Radiological Study: Ordered and Reviewed  Medical Tests: Reviewed and Ordered  ED Management:  71 yo F presents in SVT. Significant hx of. Failed OP vagal attempts. Arrived with EKG confirmed SVT. VSS. NAD. Exam benign otherwise. Reports adenosine was been very effective in the past. Pt placed on pads, monitors, and chemically converted with x1 dose of 6mg adenosine. I checked her TSH, electrolytes including mag/phos, CXR. Pt did report several weeks worsening productive cough and decreased metoprolol dosage. CXR grossly clear and workup otherwise benign. Did Rx abx for chronic worsening bronchitis and did increase metoprolol dose back to prior dose of 150mg QD (pharmacy did call later and corrected to QD, not BID). Pt is stable for d/c. Will f/u closely with cardiology later today. Strict return precautions given. (Jazmyn)                        Clinical Impression:   Final diagnoses:  [I47.1] SVT (supraventricular tachycardia) (Primary)  [J41.1] Mucopurulent chronic bronchitis  [Z76.0] Medication refill        ED Disposition Condition    Discharge Stable          ED Prescriptions       Medication Sig Dispense Start Date End Date Auth. Provider    albuterol (PROVENTIL/VENTOLIN HFA) 90 mcg/actuation inhaler Inhale 1-2 puffs into the lungs every 4 (four) hours as needed for Wheezing or Shortness of Breath. 18 g 2022 -- Bulmaro Eldridge MD    azithromycin (ZITHROMAX Z-CUONG) 250 MG tablet Take 2 tablets (500 mg) on  Day 1,  followed by 1 tablet (250 mg) once daily on Days 2 through 5. 6 tablet 2022 -- Bulmaro Eldridge MD    metoprolol succinate (TOPROL-XL) 50 MG 24 hr tablet () Take 3 tablets (150 mg total) by mouth 2 (two) times daily. Patient taking differently:  Take 75 mg by mouth 2 (two) times daily. 180 tablet 2022 Bulmaro Eldridge MD           Follow-up Information       Follow up With Specialties Details Why Contact Info    ANGELITO Soto Nurse Practitioner Schedule an appointment as soon as possible for a visit in 1 week  [address] [facility] [address]  [phone]      [facility] Emergency Medicine  As needed, If symptoms worsen [address] [facility] [id number] [phone]    Cardiologist  Go in 1 day               Bulmaro Eldridge MD  01/13/23 0019

## 2022-12-28 ENCOUNTER — TELEPHONE (OUTPATIENT)
Dept: CARDIOLOGY | Facility: CLINIC | Age: 72
End: 2022-12-28
Payer: COMMERCIAL

## 2022-12-28 NOTE — TELEPHONE ENCOUNTER
"----- Message from Yessenia Johnson MA sent at 12/27/2022  3:01 PM CST -----  Regarding: HOSPITAL  PT CALLED STATING SHE WAS IN THE HOSPITAL FOR SVT. THE DOC CHANGED HER METOPROLOL TO 75 BID. SHE WILL LIKE SOMEONE TO GIVE HER A CALL. THANKS      12-28-22 02:30 PM - Andra Koch RN    Returned pt call. She states she was seen in the emergency dept on 12-26-22 for fast heart rate and bronchitis. She states she experienced SVT, and Dr. Hewitt increased her Metoprolol succinate to 75mg twice a day. Of note, the most recent Rx sent to her pharmacy is for Metoprolol succinate 50mg  - 3 tabs twice a day. Pt is not taking this dose. Called and spoke to Salome at [facility], Pharmacy states they "caught the high dose" and spoke to ED regarding prescription, who then changed it to metoprolol succinate 100mg in the AM and 50mg in the PM. Pt is unaware of this dosage.    Pt states she feels her HR is controlled on the 75mg BID. She also complaints of chest soreness from the episode of SVT. She still has 5 days of antibiotics to complete and is taking OTC mucinex and "steroids" from a previous urgent care visit.     Pt is on waiting list for sooner appt because she was seen in clinic on 12-16-22. Possible opening for Dr. Morin's clinic in the morning. Please advise.  "

## 2022-12-29 ENCOUNTER — OFFICE VISIT (OUTPATIENT)
Dept: CARDIOLOGY | Facility: CLINIC | Age: 72
End: 2022-12-29
Payer: COMMERCIAL

## 2022-12-29 VITALS
BODY MASS INDEX: 29.13 KG/M2 | SYSTOLIC BLOOD PRESSURE: 88 MMHG | OXYGEN SATURATION: 96 % | TEMPERATURE: 99 F | DIASTOLIC BLOOD PRESSURE: 54 MMHG | HEIGHT: 62 IN | WEIGHT: 158.31 LBS | RESPIRATION RATE: 18 BRPM | HEART RATE: 62 BPM

## 2022-12-29 DIAGNOSIS — I50.20 HEART FAILURE WITH REDUCED EJECTION FRACTION: ICD-10-CM

## 2022-12-29 DIAGNOSIS — E78.2 MIXED HYPERLIPIDEMIA: Primary | ICD-10-CM

## 2022-12-29 DIAGNOSIS — Z72.0 TOBACCO USER: ICD-10-CM

## 2022-12-29 DIAGNOSIS — I10 PRIMARY HYPERTENSION: ICD-10-CM

## 2022-12-29 DIAGNOSIS — I47.10 SUPRAVENTRICULAR TACHYCARDIA: ICD-10-CM

## 2022-12-29 DIAGNOSIS — R06.09 DOE (DYSPNEA ON EXERTION): ICD-10-CM

## 2022-12-29 PROCEDURE — 99214 OFFICE O/P EST MOD 30 MIN: CPT | Mod: PBBFAC | Performed by: INTERNAL MEDICINE

## 2022-12-29 RX ORDER — METOPROLOL SUCCINATE 50 MG/1
75 TABLET, EXTENDED RELEASE ORAL 2 TIMES DAILY
Qty: 90 TABLET | Refills: 3 | Status: SHIPPED | OUTPATIENT
Start: 2022-12-29 | End: 2023-02-22 | Stop reason: SDUPTHER

## 2022-12-29 NOTE — PROGRESS NOTES
Subjective:     CHIEF COMPLAINT:   Chief Complaint   Patient presents with    Hospital Follow Up     SVT    Shortness of Breath     Pt states she has bronchitis causing increase in SOB                           HPI:    Fadumo Lynch is a 72 y.o. female.   The patient comes for a follow-up appointment.  The patient has past medical history of HFrEF (TTE 9/15/20: EF 40%), HTN, HLD, SVT, NIDDM, COPD, and tobacco.  Patient completed an Echocardiogram on 10.7.22 which revealed an EF of 55%.  See report below.  She completed an Exercise Nuclear Stress Test on Florina 15, 2021 which revealed no ischemia and no scar.  She was referred to the smoking cessation program by her PCP in the past.  She completed a CT scan of the chest and was noted to have lung nodules in which she was referred to pulmonology.  Patient was seen by pulmonology in September 2022 who stated they did not feel the nodules were malignant.     CP:  The patient has no chest discomfort.      SOB:  The patient has shortness of breath and FRASER    EDEMA:  The patient denies edema.  No ascites.    ORTHOPNEA:  The patient denies orthopnea.  No PND.      DIZZY:  The patient denies near syncope.  No syncope.      PALPITATIONS:  The patient has palpitations.  Patient used to take metoprolol succinate 75 bid, but was told to take 75 daily, but then she is having more episodes of the SVT.      LEVEL OF EXERTION:  The patient does light house work and has symptoms with this level of exertion.  The patient's level of exertion is limited.    The patient had recent cardiac testing. See echo results below.      Patient Active Problem List   Diagnosis    Chronic obstructive pulmonary disease    Chronic kidney disease    Hyperlipidemia    Hypertension    Supraventricular tachycardia    Type 2 diabetes mellitus    Tobacco user    Tobacco use    FRASER (dyspnea on exertion)    Heart failure with reduced ejection fraction       Past Surgical History:   Procedure Laterality  Date    TONSILLECTOMY AND ADENOIDECTOMY  1964    VAGINAL HYSTERECTOMY  1986       Social History     Socioeconomic History    Marital status:     Number of children: 2   Occupational History    Occupation: Retired   Tobacco Use    Smoking status: Every Day     Packs/day: 0.50     Years: 50.00     Pack years: 25.00     Types: Cigarettes    Smokeless tobacco: Never   Substance and Sexual Activity    Alcohol use: Never    Drug use: Never       Family History   Problem Relation Age of Onset    Colon cancer Mother     Cancer Father        Review of patient's allergies indicates:   Allergen Reactions    Medrol [methylprednisolone] Itching    Naproxen Hives    Zinc sulfate      Other reaction(s): LATE EFFECT OF BURNS OF OTHER SPECIFIED SITES         Current Outpatient Medications:     albuterol (PROVENTIL) 2.5 mg /3 mL (0.083 %) nebulizer solution, Take 3 mLs (2.5 mg total) by nebulization 4 (four) times daily as needed for Wheezing or Shortness of Breath., Disp: 360 mL, Rfl: 2    albuterol (PROVENTIL/VENTOLIN HFA) 90 mcg/actuation inhaler, Inhale 1-2 puffs into the lungs every 4 (four) hours as needed for Wheezing or Shortness of Breath., Disp: 18 g, Rfl: 0    aspirin 81 MG Chew, Take 81 mg by mouth once daily., Disp: , Rfl:     azithromycin (ZITHROMAX Z-CUONG) 250 MG tablet, Take 2 tablets (500 mg) on  Day 1,  followed by 1 tablet (250 mg) once daily on Days 2 through 5., Disp: 6 tablet, Rfl: 0    diclofenac sodium (VOLTAREN) 1 % Gel, Apply 2 g topically 4 (four) times daily as needed (hand pain)., Disp: 100 g, Rfl: 2    lisinopriL (PRINIVIL,ZESTRIL) 2.5 MG tablet, Take 1 tablet (2.5 mg total) by mouth once daily., Disp: 90 tablet, Rfl: 3    metFORMIN (GLUCOPHAGE) 500 MG tablet, Take 1 tablet (500 mg total) by mouth once daily., Disp: 90 tablet, Rfl: 1    metoprolol succinate (TOPROL-XL) 50 MG 24 hr tablet, Take 3 tablets (150 mg total) by mouth 2 (two) times daily. (Patient taking differently: Take 75 mg by mouth 2  "(two) times daily.), Disp: 180 tablet, Rfl: 0    montelukast (SINGULAIR) 10 mg tablet, Take 1 tablet (10 mg total) by mouth every evening., Disp: 30 tablet, Rfl: 0    pravastatin (PRAVACHOL) 80 MG tablet, Take 1 tablet (80 mg total) by mouth every evening., Disp: 90 tablet, Rfl: 3    TRELEGY ELLIPTA 100-62.5-25 mcg DsDv, Inhale 1 puff into the lungs once daily., Disp: 1 each, Rfl: 6    vitamin D (VITAMIN D3) 1000 units Tab, Take 2,000 Units by mouth once daily., Disp: , Rfl:     nitroGLYCERIN (NITROSTAT) 0.4 MG SL tablet, Place 0.4 mg under the tongue., Disp: , Rfl:     ROS:                                                                                                                                                                             CONSTITUTIONAL:    + fever.  No night sweats.  RESPIRATORY:  + cough productive of clear or light yellow sputum.  No hemoptysis.  + wheezing.  SKIN:  No poor wound healing.  No rash.  CARDIOVASCULAR:  No claudication.  No cyanosis.     HEMATOLOGY:   No adenopathy.  No easy bruising.   MUSCULOSKELETAL:  No muscle cramp.  No muscle weakness.  GASTROENTEROLOGY:  No heart burn.  No melena.    NEUROLOGIC:  No dizziness.  + generalized weakness.     Objective:     PE:  Blood pressure (!) 88/54, pulse 62, temperature 99.1 °F (37.3 °C), temperature source Oral, resp. rate 18, height 5' 2" (1.575 m), weight 71.8 kg (158 lb 4.6 oz), SpO2 96 %.  GENERAL:  NAD  CONSTITUTIONAL:  No acute distress.  Not ill appearing.  NECK:  No cervical adenopathy.  No carotid bruit.  CARDIOVASCULAR:  Normal rate.  Regular rhythm.  No murmur.  PULMONARY:  No respiratory distress.  + wheezing.  Decreased air movement.   ABDOMINAL:  No distention.  No tenderness.  MUSCULOSKELETAL:  No deformity.  No edema.  SKIN:  No bruising.  No rash.  NEUROLOGICAL:  Oriented x 3.  No weakness.                                                                                                                                    "                                                                                                                                                                                                                                                                                                                                            CARDIAC TESTING:  Echocardiogram  Results for orders placed during the hospital encounter of 10/07/22    Echo    Interpretation Summary  · Normal systolic function.  · The estimated ejection fraction is 55%.  · Normal right ventricular size with normal right ventricular systolic function.  · Normal central venous pressure (3 mmHg).  · The estimated PA systolic pressure is 12 mmHg.  · IVC is normal.    Stress Test  No results found for this or any previous visit.     Coronary Angiogram  No results found for this or any previous visit.       Assessment:     1. Mixed hyperlipidemia    2. Primary hypertension    3. Supraventricular tachycardia    4. FRASER (dyspnea on exertion)    5. Heart failure with reduced ejection fraction    6. Tobacco user      Plan:     Medications: change metoprolol succinate to 75 mg bid    Tobacco:  encouraged to quit    Diet:  encouraged to avoid processed foods and drinks    Exercise:  increase activities as tolerated    Follow up:  3 months    Yung Townsend MD

## 2023-01-02 ENCOUNTER — OFFICE VISIT (OUTPATIENT)
Dept: URGENT CARE | Facility: CLINIC | Age: 73
End: 2023-01-02
Payer: COMMERCIAL

## 2023-01-02 ENCOUNTER — HOSPITAL ENCOUNTER (OUTPATIENT)
Dept: RADIOLOGY | Facility: HOSPITAL | Age: 73
Discharge: HOME | End: 2023-01-02
Attending: NURSE PRACTITIONER
Payer: COMMERCIAL

## 2023-01-02 VITALS
SYSTOLIC BLOOD PRESSURE: 100 MMHG | TEMPERATURE: 99 F | OXYGEN SATURATION: 97 % | HEART RATE: 69 BPM | WEIGHT: 156 LBS | BODY MASS INDEX: 28.71 KG/M2 | DIASTOLIC BLOOD PRESSURE: 57 MMHG | RESPIRATION RATE: 20 BRPM | HEIGHT: 62 IN

## 2023-01-02 DIAGNOSIS — R09.89 SYMPTOMS OF UPPER RESPIRATORY INFECTION (URI): ICD-10-CM

## 2023-01-02 DIAGNOSIS — R05.9 COUGH, UNSPECIFIED TYPE: ICD-10-CM

## 2023-01-02 DIAGNOSIS — J01.10 SUBACUTE FRONTAL SINUSITIS: Primary | ICD-10-CM

## 2023-01-02 PROBLEM — E04.1 THYROID NODULE: Status: ACTIVE | Noted: 2023-01-02

## 2023-01-02 PROBLEM — M19.90 ARTHRITIS: Status: ACTIVE | Noted: 2023-01-02

## 2023-01-02 PROBLEM — J34.89 LESION OF NOSE: Status: ACTIVE | Noted: 2023-01-02

## 2023-01-02 PROBLEM — M81.0 OSTEOPOROSIS: Status: ACTIVE | Noted: 2023-01-02

## 2023-01-02 PROBLEM — R73.02 IMPAIRED GLUCOSE TOLERANCE: Status: ACTIVE | Noted: 2023-01-02

## 2023-01-02 PROBLEM — R06.02 SHORTNESS OF BREATH: Status: ACTIVE | Noted: 2022-08-16

## 2023-01-02 LAB
CTP QC/QA: YES
CTP QC/QA: YES
FLUAV AG NPH QL: NEGATIVE
FLUBV AG NPH QL: NEGATIVE
SARS-COV-2 RDRP RESP QL NAA+PROBE: NEGATIVE

## 2023-01-02 PROCEDURE — 87635 SARS-COV-2 COVID-19 AMP PRB: CPT | Mod: PBBFAC | Performed by: NURSE PRACTITIONER

## 2023-01-02 PROCEDURE — 99215 OFFICE O/P EST HI 40 MIN: CPT | Mod: PBBFAC,25 | Performed by: NURSE PRACTITIONER

## 2023-01-02 PROCEDURE — 99213 OFFICE O/P EST LOW 20 MIN: CPT | Mod: S$PBB,,, | Performed by: NURSE PRACTITIONER

## 2023-01-02 PROCEDURE — 71046 X-RAY EXAM CHEST 2 VIEWS: CPT | Mod: TC

## 2023-01-02 PROCEDURE — 99213 PR OFFICE/OUTPT VISIT, EST, LEVL III, 20-29 MIN: ICD-10-PCS | Mod: S$PBB,,, | Performed by: NURSE PRACTITIONER

## 2023-01-02 PROCEDURE — 87804 INFLUENZA ASSAY W/OPTIC: CPT | Mod: PBBFAC | Performed by: NURSE PRACTITIONER

## 2023-01-02 RX ORDER — PREDNISONE 10 MG/1
TABLET ORAL
Qty: 10 TABLET | Refills: 0 | Status: SHIPPED | OUTPATIENT
Start: 2023-01-02 | End: 2023-04-11

## 2023-01-02 RX ORDER — AMOXICILLIN AND CLAVULANATE POTASSIUM 875; 125 MG/1; MG/1
1 TABLET, FILM COATED ORAL 2 TIMES DAILY
Qty: 20 TABLET | Refills: 0 | Status: SHIPPED | OUTPATIENT
Start: 2023-01-02 | End: 2023-01-12

## 2023-01-02 NOTE — PROGRESS NOTES
"Subjective:       Patient ID: Fadumo Lynch is a 72 y.o. female.    Vitals:  height is 5' 2" (1.575 m) and weight is 70.8 kg (156 lb). Her oral temperature is 98.8 °F (37.1 °C). Her blood pressure is 100/57 (abnormal) and her pulse is 69. Her respiration is 20 and oxygen saturation is 97%.     Chief Complaint: Fever (Cough, headache, swollen gland noted to left neck, congestion.  )    HPI was seen December 19th here in urgent care, tested neg for COVID/FLU, diagnosed with Bronchitis. Given azithromycin and steroids. Went to ER 7 days later in SVT, rate 180s, given adenosine 6mg x1 dose, resolved, D/C to home. Followed up with Cardiology. Here today with symptoms as stated in CC. Requesting prednisone. Completed medrol dose pack approx 5 days ago. ER doctor Rx'ed Azithromycin 5 day course, 7 days after first Rx was given in UC. Pt took both to completion. No improvement in symptoms. States mucous is yellow. Using albuterol inhaler 3-4x/day every day. States normally does not have wheezing due to COPD when not ill. Temps have been 99 at home.  ROS    Objective:      Physical Exam   Constitutional: She is oriented to person, place, and time.  Non-toxic appearance. She appears ill. No distress.   HENT:   Ears:   Right Ear: Tympanic membrane and external ear normal.   Left Ear: Tympanic membrane and external ear normal.   Nose: Congestion present. No rhinorrhea.   Mouth/Throat: No oropharyngeal exudate or posterior oropharyngeal erythema.   Eyes: Conjunctivae are normal.   Cardiovascular: Normal rate and normal heart sounds.   Pulmonary/Chest: No respiratory distress. She has wheezes.   Abdominal: Normal appearance.   Musculoskeletal:      Cervical back: She exhibits tenderness.   Lymphadenopathy:     She has cervical adenopathy.        Left cervical: Superficial cervical adenopathy present.   Neurological: She is alert and oriented to person, place, and time.   Skin: Skin is warm, dry and not diaphoretic. "   Psychiatric: Mood normal.   Vitals reviewed.      Assessment:       1. Subacute frontal sinusitis    2. Cough, unspecified type    3. Symptoms of upper respiratory infection (URI)          Results for orders placed or performed in visit on 01/02/23   POCT COVID-19 Rapid Screening   Result Value Ref Range    POC Rapid COVID Negative Negative     Acceptable Yes    POCT Influenza A/B   Result Value Ref Range    Rapid Influenza A Ag Negative Negative    Rapid Influenza B Ag Negative Negative     Acceptable Yes      XR CHEST PA AND LATERAL    Result Date: 1/2/2023  EXAMINATION: XR CHEST PA AND LATERAL   CLINICAL HISTORY: cough since 12/19; productive yellow mucous; wheezing, COPD; Cough, unspecified   TECHNIQUE: PA and lateral views of the chest were performed.   COMPARISON: 12/26/2022   FINDINGS: The lungs are clear, with normal appearance of pulmonary vasculature and no pleural effusion or pneumothorax. The cardiac silhouette is normal in size. The hilar and mediastinal contours are unremarkable. Bones are intact.     No acute abnormality.   Electronically signed by: Yung Lopez MD   Date:    01/02/2023   Time:    15:48      X-Ray Chest AP Portable    Result Date: 12/26/2022  EXAMINATION: XR CHEST AP PORTABLE   CLINICAL HISTORY: Chest Pain;   TECHNIQUE: One view   COMPARISON: July 11, 2022.   FINDINGS: Cardiopericardial silhouette is within normal limits. Lungs are without dense focal or segmental consolidation, congestive process, pleural effusions or pneumothorax.     NO ACUTE CARDIOPULMONARY PROCESS IDENTIFIED.   Electronically signed by: Russ Pedroza   Date:    12/26/2022   Time:    11:19        Plan:         Subacute frontal sinusitis  -     POCT Influenza A/B  -     XR CHEST PA AND LATERAL    Cough, unspecified type  -     POCT COVID-19 Rapid Screening  -     POCT Influenza A/B  -     XR CHEST PA AND LATERAL    Symptoms of upper respiratory infection (URI)  -     POCT COVID-19  Rapid Screening    Other orders  -     amoxicillin-clavulanate 875-125mg (AUGMENTIN) 875-125 mg per tablet; Take 1 tablet by mouth 2 (two) times daily. for 10 days  Dispense: 20 tablet; Refill: 0  -     predniSONE (DELTASONE) 10 MG tablet; 40mg (4 tabs) day 1. 30mg (3 tabs) day 2; 20mg (2 tabs) day 3. 10mg (1 tab) day 4. Take with food and water no later than 5pm.  Dispense: 10 tablet; Refill: 0            You had a chest xray to day in Urgent Care.  If you have pneumonia or any other abnormal finding, we will call you about it.    No matter what, start taking the antibiotic today. Augmentin.  Do not start Prednisone tabs until tomorrow morning, after you eat breakfast.

## 2023-01-02 NOTE — PATIENT INSTRUCTIONS
You had a chest xray to day in Urgent Care.  If you have pneumonia or any other abnormal finding, we will call you about it.    No matter what, start taking the antibiotic today. Augmentin.  Do not start Prednisone tabs until tomorrow morning, after you eat breakfast.

## 2023-02-22 DIAGNOSIS — I10 PRIMARY HYPERTENSION: ICD-10-CM

## 2023-02-22 DIAGNOSIS — I50.20 HEART FAILURE WITH REDUCED EJECTION FRACTION: ICD-10-CM

## 2023-02-22 DIAGNOSIS — I47.10 SUPRAVENTRICULAR TACHYCARDIA: ICD-10-CM

## 2023-02-22 RX ORDER — METOPROLOL SUCCINATE 50 MG/1
50 TABLET, EXTENDED RELEASE ORAL 2 TIMES DAILY
Qty: 180 TABLET | Refills: 2 | Status: SHIPPED | OUTPATIENT
Start: 2023-02-22 | End: 2023-03-24

## 2023-02-22 RX ORDER — METOPROLOL SUCCINATE 25 MG/1
25 TABLET, EXTENDED RELEASE ORAL 2 TIMES DAILY
Qty: 180 TABLET | Refills: 2 | Status: SHIPPED | OUTPATIENT
Start: 2023-02-22 | End: 2023-05-02 | Stop reason: SDUPTHER

## 2023-04-04 ENCOUNTER — LAB VISIT (OUTPATIENT)
Dept: LAB | Facility: HOSPITAL | Age: 73
End: 2023-04-04
Attending: NURSE PRACTITIONER
Payer: COMMERCIAL

## 2023-04-04 DIAGNOSIS — E11.9 TYPE 2 DIABETES MELLITUS WITHOUT COMPLICATION, WITHOUT LONG-TERM CURRENT USE OF INSULIN: ICD-10-CM

## 2023-04-04 DIAGNOSIS — I10 HYPERTENSION, UNSPECIFIED TYPE: ICD-10-CM

## 2023-04-04 DIAGNOSIS — E55.9 VITAMIN D DEFICIENCY: ICD-10-CM

## 2023-04-04 DIAGNOSIS — E78.5 HYPERLIPIDEMIA, UNSPECIFIED HYPERLIPIDEMIA TYPE: ICD-10-CM

## 2023-04-04 LAB
ALBUMIN SERPL-MCNC: 3.4 G/DL (ref 3.4–4.8)
ALBUMIN/GLOB SERPL: 0.9 RATIO (ref 1.1–2)
ALP SERPL-CCNC: 99 UNIT/L (ref 40–150)
ALT SERPL-CCNC: 14 UNIT/L (ref 0–55)
AST SERPL-CCNC: 15 UNIT/L (ref 5–34)
BASOPHILS # BLD AUTO: 0.06 X10(3)/MCL (ref 0–0.2)
BASOPHILS NFR BLD AUTO: 0.5 %
BILIRUBIN DIRECT+TOT PNL SERPL-MCNC: 0.5 MG/DL
BUN SERPL-MCNC: 6.3 MG/DL (ref 9.8–20.1)
CALCIUM SERPL-MCNC: 9.2 MG/DL (ref 8.4–10.2)
CHLORIDE SERPL-SCNC: 101 MMOL/L (ref 98–107)
CHOLEST SERPL-MCNC: 137 MG/DL
CHOLEST/HDLC SERPL: 3 {RATIO} (ref 0–5)
CO2 SERPL-SCNC: 31 MMOL/L (ref 23–31)
CREAT SERPL-MCNC: 0.86 MG/DL (ref 0.55–1.02)
DEPRECATED CALCIDIOL+CALCIFEROL SERPL-MC: 36.8 NG/ML (ref 30–80)
EOSINOPHIL # BLD AUTO: 0.44 X10(3)/MCL (ref 0–0.9)
EOSINOPHIL NFR BLD AUTO: 4 %
ERYTHROCYTE [DISTWIDTH] IN BLOOD BY AUTOMATED COUNT: 13.1 % (ref 11.5–17)
EST. AVERAGE GLUCOSE BLD GHB EST-MCNC: 119.8 MG/DL
GFR SERPLBLD CREATININE-BSD FMLA CKD-EPI: >60 MLS/MIN/1.73/M2
GLOBULIN SER-MCNC: 3.9 GM/DL (ref 2.4–3.5)
GLUCOSE SERPL-MCNC: 106 MG/DL (ref 82–115)
HBA1C MFR BLD: 5.8 %
HCT VFR BLD AUTO: 41.9 % (ref 37–47)
HDLC SERPL-MCNC: 40 MG/DL (ref 35–60)
HGB BLD-MCNC: 13.5 G/DL (ref 12–16)
IMM GRANULOCYTES # BLD AUTO: 0.05 X10(3)/MCL (ref 0–0.04)
IMM GRANULOCYTES NFR BLD AUTO: 0.5 %
LDLC SERPL CALC-MCNC: 67 MG/DL (ref 50–140)
LYMPHOCYTES # BLD AUTO: 3.34 X10(3)/MCL (ref 0.6–4.6)
LYMPHOCYTES NFR BLD AUTO: 30.4 %
MCH RBC QN AUTO: 30.3 PG (ref 27–31)
MCHC RBC AUTO-ENTMCNC: 32.2 G/DL (ref 33–36)
MCV RBC AUTO: 93.9 FL (ref 80–94)
MONOCYTES # BLD AUTO: 0.6 X10(3)/MCL (ref 0.1–1.3)
MONOCYTES NFR BLD AUTO: 5.5 %
NEUTROPHILS # BLD AUTO: 6.5 X10(3)/MCL (ref 2.1–9.2)
NEUTROPHILS NFR BLD AUTO: 59.1 %
NRBC BLD AUTO-RTO: 0 %
PLATELET # BLD AUTO: 320 X10(3)/MCL (ref 130–400)
PMV BLD AUTO: 9.1 FL (ref 7.4–10.4)
POTASSIUM SERPL-SCNC: 4.4 MMOL/L (ref 3.5–5.1)
PROT SERPL-MCNC: 7.3 GM/DL (ref 5.8–7.6)
RBC # BLD AUTO: 4.46 X10(6)/MCL (ref 4.2–5.4)
SODIUM SERPL-SCNC: 138 MMOL/L (ref 136–145)
TRIGL SERPL-MCNC: 149 MG/DL (ref 37–140)
VLDLC SERPL CALC-MCNC: 30 MG/DL
WBC # SPEC AUTO: 11 X10(3)/MCL (ref 4.5–11.5)

## 2023-04-04 PROCEDURE — 36415 COLL VENOUS BLD VENIPUNCTURE: CPT

## 2023-04-04 PROCEDURE — 80061 LIPID PANEL: CPT

## 2023-04-04 PROCEDURE — 82306 VITAMIN D 25 HYDROXY: CPT

## 2023-04-04 PROCEDURE — 83036 HEMOGLOBIN GLYCOSYLATED A1C: CPT

## 2023-04-04 PROCEDURE — 80053 COMPREHEN METABOLIC PANEL: CPT

## 2023-04-04 PROCEDURE — 85025 COMPLETE CBC W/AUTO DIFF WBC: CPT

## 2023-04-11 ENCOUNTER — OFFICE VISIT (OUTPATIENT)
Dept: INTERNAL MEDICINE | Facility: CLINIC | Age: 73
End: 2023-04-11
Payer: COMMERCIAL

## 2023-04-11 VITALS
BODY MASS INDEX: 28.93 KG/M2 | DIASTOLIC BLOOD PRESSURE: 60 MMHG | WEIGHT: 157.19 LBS | SYSTOLIC BLOOD PRESSURE: 104 MMHG | HEIGHT: 62 IN | HEART RATE: 76 BPM | TEMPERATURE: 98 F | RESPIRATION RATE: 16 BRPM

## 2023-04-11 DIAGNOSIS — E55.9 VITAMIN D DEFICIENCY: ICD-10-CM

## 2023-04-11 DIAGNOSIS — E04.2 MULTINODULAR THYROID: ICD-10-CM

## 2023-04-11 DIAGNOSIS — E11.9 TYPE 2 DIABETES MELLITUS WITHOUT COMPLICATION, WITHOUT LONG-TERM CURRENT USE OF INSULIN: ICD-10-CM

## 2023-04-11 DIAGNOSIS — J44.9 CHRONIC OBSTRUCTIVE PULMONARY DISEASE, UNSPECIFIED COPD TYPE: ICD-10-CM

## 2023-04-11 DIAGNOSIS — F17.210 CIGARETTE NICOTINE DEPENDENCE WITHOUT COMPLICATION: ICD-10-CM

## 2023-04-11 DIAGNOSIS — I10 HYPERTENSION, UNSPECIFIED TYPE: ICD-10-CM

## 2023-04-11 PROCEDURE — 3288F PR FALLS RISK ASSESSMENT DOCUMENTED: ICD-10-PCS | Mod: CPTII,,, | Performed by: NURSE PRACTITIONER

## 2023-04-11 PROCEDURE — 4010F ACE/ARB THERAPY RXD/TAKEN: CPT | Mod: CPTII,,, | Performed by: NURSE PRACTITIONER

## 2023-04-11 PROCEDURE — 3078F DIAST BP <80 MM HG: CPT | Mod: CPTII,,, | Performed by: NURSE PRACTITIONER

## 2023-04-11 PROCEDURE — 99406 PR TOBACCO USE CESSATION INTERMEDIATE 3-10 MINUTES: ICD-10-PCS | Mod: S$PBB,,, | Performed by: NURSE PRACTITIONER

## 2023-04-11 PROCEDURE — 3008F BODY MASS INDEX DOCD: CPT | Mod: CPTII,,, | Performed by: NURSE PRACTITIONER

## 2023-04-11 PROCEDURE — 99214 OFFICE O/P EST MOD 30 MIN: CPT | Mod: PBBFAC | Performed by: NURSE PRACTITIONER

## 2023-04-11 PROCEDURE — 99406 BEHAV CHNG SMOKING 3-10 MIN: CPT | Mod: S$PBB,,, | Performed by: NURSE PRACTITIONER

## 2023-04-11 PROCEDURE — 1160F PR REVIEW ALL MEDS BY PRESCRIBER/CLIN PHARMACIST DOCUMENTED: ICD-10-PCS | Mod: CPTII,,, | Performed by: NURSE PRACTITIONER

## 2023-04-11 PROCEDURE — 4010F PR ACE/ARB THEARPY RXD/TAKEN: ICD-10-PCS | Mod: CPTII,,, | Performed by: NURSE PRACTITIONER

## 2023-04-11 PROCEDURE — 1159F MED LIST DOCD IN RCRD: CPT | Mod: CPTII,,, | Performed by: NURSE PRACTITIONER

## 2023-04-11 PROCEDURE — 3008F PR BODY MASS INDEX (BMI) DOCUMENTED: ICD-10-PCS | Mod: CPTII,,, | Performed by: NURSE PRACTITIONER

## 2023-04-11 PROCEDURE — 1101F PT FALLS ASSESS-DOCD LE1/YR: CPT | Mod: CPTII,,, | Performed by: NURSE PRACTITIONER

## 2023-04-11 PROCEDURE — 1159F PR MEDICATION LIST DOCUMENTED IN MEDICAL RECORD: ICD-10-PCS | Mod: CPTII,,, | Performed by: NURSE PRACTITIONER

## 2023-04-11 PROCEDURE — 99214 PR OFFICE/OUTPT VISIT, EST, LEVL IV, 30-39 MIN: ICD-10-PCS | Mod: S$PBB,25,, | Performed by: NURSE PRACTITIONER

## 2023-04-11 PROCEDURE — 99214 OFFICE O/P EST MOD 30 MIN: CPT | Mod: S$PBB,25,, | Performed by: NURSE PRACTITIONER

## 2023-04-11 PROCEDURE — 3074F PR MOST RECENT SYSTOLIC BLOOD PRESSURE < 130 MM HG: ICD-10-PCS | Mod: CPTII,,, | Performed by: NURSE PRACTITIONER

## 2023-04-11 PROCEDURE — 1101F PR PT FALLS ASSESS DOC 0-1 FALLS W/OUT INJ PAST YR: ICD-10-PCS | Mod: CPTII,,, | Performed by: NURSE PRACTITIONER

## 2023-04-11 PROCEDURE — 3078F PR MOST RECENT DIASTOLIC BLOOD PRESSURE < 80 MM HG: ICD-10-PCS | Mod: CPTII,,, | Performed by: NURSE PRACTITIONER

## 2023-04-11 PROCEDURE — 3288F FALL RISK ASSESSMENT DOCD: CPT | Mod: CPTII,,, | Performed by: NURSE PRACTITIONER

## 2023-04-11 PROCEDURE — 3074F SYST BP LT 130 MM HG: CPT | Mod: CPTII,,, | Performed by: NURSE PRACTITIONER

## 2023-04-11 PROCEDURE — 1160F RVW MEDS BY RX/DR IN RCRD: CPT | Mod: CPTII,,, | Performed by: NURSE PRACTITIONER

## 2023-04-11 RX ORDER — ALBUTEROL SULFATE 0.83 MG/ML
2.5 SOLUTION RESPIRATORY (INHALATION) 4 TIMES DAILY PRN
Qty: 120 EACH | Refills: 6 | Status: SHIPPED | OUTPATIENT
Start: 2023-04-11 | End: 2023-10-10 | Stop reason: SDUPTHER

## 2023-04-11 RX ORDER — FLUTICASONE FUROATE, UMECLIDINIUM BROMIDE AND VILANTEROL TRIFENATATE 100; 62.5; 25 UG/1; UG/1; UG/1
1 POWDER RESPIRATORY (INHALATION) DAILY
Qty: 1 EACH | Refills: 6 | Status: SHIPPED | OUTPATIENT
Start: 2023-04-11 | End: 2023-10-10 | Stop reason: SDUPTHER

## 2023-04-11 RX ORDER — METFORMIN HYDROCHLORIDE 500 MG/1
500 TABLET ORAL DAILY
Qty: 90 TABLET | Refills: 1 | Status: SHIPPED | OUTPATIENT
Start: 2023-04-11 | End: 2023-10-10 | Stop reason: SDUPTHER

## 2023-04-11 RX ORDER — ALBUTEROL SULFATE 90 UG/1
1-2 AEROSOL, METERED RESPIRATORY (INHALATION) EVERY 4 HOURS PRN
Qty: 18 G | Refills: 4 | Status: SHIPPED | OUTPATIENT
Start: 2023-04-11 | End: 2023-10-10 | Stop reason: SDUPTHER

## 2023-04-11 NOTE — PROGRESS NOTES
Internal Medicine Clinic  ANGELITO Soto     Patient Name: Fadumo Lynch   : 1950  MRN:[id number]     Chief Complaint     Chief Complaint   Patient presents with    Follow-up     Lab review        History of Present Illness     73 year old white female, presents in clinic for lab results. Request refill on nebulizer supplies and albuterol.   PMH SVT, HLD, HTN, CKD, COPD, T2DM, Osteopenia, multinodular thyroid, basal cell carcinoma, tobacco user. Pt states she is not ready to quit smoking; 10 cig/d. Pt is followed by Renal clinic for stage 3 CKD, cardiology clinic for SVT and medications, and ENT for basal cell carcinoma (nasal lesion) and thyroid nodules. Reports significant improvement in her palpitations with the metoprolol succinate 75 mg twice daily dose as per Cardio. Pulmonary clinic is following for pulmonary nodule. Denies chest pain, shortness of breath, cough, fever, headache, dizziness, weakness, abdominal pain, nausea, vomiting, diarrhea, constipation, dysuria, depression, anxiety.     Thyroid US FINDINGS:2022  Examination reveals the right hemithyroid to measure 4.4 cm x 1.9 x 1.6 cm, left earnest thyroid measures 3.7 x 1.7 x 1.5 cm isthmus measures 4.3 mm in thickness  On the right multiple anechoic/hypoechoic nodules are identified within the right lobe of the thyroid the largest measuring 1.2 x 1.4 x 0.6 cm mesh least stable to minimally enlarged as compared with the previous exam other subcentimeter nodules are identified largest measuring 6 mm x 5 mm x 3 mm.  On the left multiple subcentimeter nodules are identified a larger subcentimeter nodule measures 1.1 by 1 x 8 mm corresponding to a TI-RADS type 4 nodule follow-up is recommended if greater than 1 cm at 1 year, 2 years, 3 years and 5 years.  No other abnormalities  Impression:  Nodules as above with recommendations as above.  The subcentimeter nodules do not meet criteria for biopsy or surveillance.  No significant change  "since June 2019       Mammogram 12/13/2022; BIRADS 1  DEXA 12/13/2022; Normal bone mineral density of the lumbar vertebrae. Improved osteopenia of the femurs.       Exercise nuclear stress test on Florina 15, 2021 which revealed no ischemia and no scar.   ECHO 9/2020; EF 40 %  Cologuard neg 10/19/2022  Following ENT; LOV 3/22/2021  Diabetic fundus; 1/2022 No retinopathy        Review of Systems     Review of Systems   Constitutional: Negative.    HENT: Negative.     Eyes: Negative.    Respiratory: Negative.     Cardiovascular: Negative.    Gastrointestinal: Negative.    Endocrine: Negative.    Genitourinary: Negative.    Musculoskeletal: Negative.    Integumentary:  Negative.   Allergic/Immunologic: Negative.    Neurological: Negative.    Hematological: Negative.    Psychiatric/Behavioral: Negative.     All other systems reviewed and are negative.     Physical Examination     Visit Vitals  /60 (BP Location: Right arm, Patient Position: Sitting, BP Method: Medium (Manual))   Pulse 76   Temp 98.4 °F (36.9 °C) (Oral)   Resp 16   Ht 5' 2" (1.575 m)   Wt 71.3 kg (157 lb 3.2 oz)   BMI 28.75 kg/m²          Wt Readings from Last 3 Encounters:   04/11/23 0823 71.3 kg (157 lb 3.2 oz)   01/02/23 1437 70.8 kg (156 lb)   12/29/22 1028 71.8 kg (158 lb 4.6 oz)      Physical Exam  Vitals and nursing note reviewed.   Constitutional:       Appearance: Normal appearance.   HENT:      Head: Normocephalic and atraumatic.      Right Ear: Tympanic membrane, ear canal and external ear normal.      Left Ear: Tympanic membrane, ear canal and external ear normal.      Nose: Nose normal.      Mouth/Throat:      Mouth: Mucous membranes are moist.      Pharynx: Oropharynx is clear.   Eyes:      Extraocular Movements: Extraocular movements intact.      Conjunctiva/sclera: Conjunctivae normal.      Pupils: Pupils are equal, round, and reactive to light.   Cardiovascular:      Rate and Rhythm: Normal rate and regular rhythm.      Pulses: Normal " pulses.      Heart sounds: Normal heart sounds.   Pulmonary:      Effort: Pulmonary effort is normal.      Breath sounds: Normal breath sounds.   Abdominal:      General: Abdomen is flat. Bowel sounds are normal.      Palpations: Abdomen is soft.   Musculoskeletal:         General: Normal range of motion.      Cervical back: Normal range of motion and neck supple.   Skin:     General: Skin is warm and dry.      Capillary Refill: Capillary refill takes less than 2 seconds.   Neurological:      General: No focal deficit present.      Mental Status: She is alert and oriented to person, place, and time. Mental status is at baseline.   Psychiatric:         Mood and Affect: Mood normal.         Behavior: Behavior normal.         Thought Content: Thought content normal.         Judgment: Judgment normal.        Labs / Imaging     Chemistry:  Lab Results   Component Value Date     04/04/2023    K 4.4 04/04/2023    CHLORIDE 101 04/04/2023    BUN 6.3 (L) 04/04/2023    CREATININE 0.86 04/04/2023    EGFRNORACEVR >60 04/04/2023    GLUCOSE 106 04/04/2023    CALCIUM 9.2 04/04/2023    ALKPHOS 99 04/04/2023    LABPROT 7.3 04/04/2023    ALBUMIN 3.4 04/04/2023    BILIDIR 0.2 08/31/2021    IBILI 0.20 08/31/2021    AST 15 04/04/2023    ALT 14 04/04/2023    MG 2.10 12/26/2022    PHOS 3.3 12/26/2022    [id number] 36.8 04/04/2023        Lab Results   Component Value Date    HGBA1C 5.8 04/04/2023        Hematology:  Lab Results   Component Value Date    WBC 11.0 04/04/2023    RBC 4.46 04/04/2023    HGB 13.5 04/04/2023    HCT 41.9 04/04/2023    MCV 93.9 04/04/2023    MCH 30.3 04/04/2023    MCHC 32.2 (L) 04/04/2023    RDW 13.1 04/04/2023     04/04/2023    MPV 9.1 04/04/2023        Lipid Panel:  Lab Results   Component Value Date    CHOL 137 04/04/2023    HDL 40 04/04/2023    LDL 67.00 04/04/2023    TRIG 149 (H) 04/04/2023    TOTALCHOLEST 3 04/04/2023        Urine:  Lab Results   Component Value Date    COLORUA Yellow 10/04/2022     APPEARANCEUA Clear 10/04/2022    SGUA 1.026 10/04/2022    PHUA 5.5 10/04/2022    PROTEINUA Negative 10/04/2022    GLUCOSEUA Normal 10/04/2022    KETONESUA Negative 10/04/2022    BLOODUA Negative 10/04/2022    NITRITESUA Negative 10/04/2022    LEUKOCYTESUR Negative 10/04/2022    RBCUA 0-5 10/04/2022    WBCUA 0-5 10/04/2022    BACTERIA None Seen 10/04/2022    SQEPUA Moderate (A) 10/04/2022    HYALINECASTS None Seen 10/04/2022    CREATRANDUR 205.8 (H) 10/04/2022    PROTEINURINE <6.8 05/12/2021          Assessment       ICD-10-CM ICD-9-CM   1. Chronic obstructive pulmonary disease, unspecified COPD type  J44.9 496   2. Type 2 diabetes mellitus without complication, without long-term current use of insulin  E11.9 250.00   3. Hypertension, unspecified type  I10 401.9   4. Multinodular thyroid  E04.2 241.1   5. BMI 28.0-28.9,adult  Z68.28 V85.24   6. Cigarette nicotine dependence without complication  F17.210 305.1   7. Vitamin D deficiency  E55.9 268.9        Plan     1. Chronic obstructive pulmonary disease, unspecified COPD type  Smoking cessation encouraged, declines TCI  Refills on Inhalers and nebulized meds/supplies  - TRELEGY ELLIPTA 100-62.5-25 mcg DsDv; Inhale 1 puff into the lungs once daily.  Dispense: 1 each; Refill: 6  - albuterol (PROVENTIL/VENTOLIN HFA) 90 mcg/actuation inhaler; Inhale 1-2 puffs into the lungs every 4 (four) hours as needed for Wheezing or Shortness of Breath.  Dispense: 18 g; Refill: 4  - albuterol (PROVENTIL) 2.5 mg /3 mL (0.083 %) nebulizer solution; Take 3 mLs (2.5 mg total) by nebulization 4 (four) times daily as needed for Wheezing or Shortness of Breath.  Dispense: 120 each; Refill: 6  - NEBULIZER KIT (SUPPLIES) FOR HOME USE    2. Type 2 diabetes mellitus without complication, without long-term current use of insulin  A1C 5.8 stable. Med refills.  ADA diet; more fruits and vegetables, lean meats, plant based sources of protein, less added sugar, less processed foods.   Medication  compliance, and strict home glucose monitoring advised.  Goal A1C <7 %  Diabetic foot exam annually:  Diabetic eye exam annually:  Urine Microalbumin every 6 months:   - Diabetic Eye Screening Photo; Future  - CBC Auto Differential; Future  - Comprehensive Metabolic Panel; Future  - Lipid Panel; Future  - Hemoglobin A1C; Future  - Urinalysis; Future  - Microalbumin/Creatinine Ratio, Urine; Future  - Urinalysis    3. Hypertension, unspecified type  BP and HR stable. Meds continued. DASH diet: Eat more fruits, vegetables, and low fat dairy foods.  (Less than 2 grams of sodium per day).  Maintain healthy weight with goal BMI <30.   Exercise 30 minutes per day 5 days per week.  Home medications refilled and continued.   Home BP monitoring encouraged with BP parameters given.    - CBC Auto Differential; Future  - Comprehensive Metabolic Panel; Future  - Lipid Panel; Future  - Hemoglobin A1C; Future  - Urinalysis; Future  - Microalbumin/Creatinine Ratio, Urine; Future  - Urinalysis    4. Multinodular thyroid  US reviewed with pt and stable.  Continue to monitor TFT annually    5. BMI 28.0-28.9,adult  Goal BMI <30.  Exercise 5 times a week for 30 minutes per day.  Avoid soda, simple sugars, excessive rice, potatoes or bread. Limit fast foods and fried foods.  Choose complex carbs in moderation (example: green vegetables, beans, oatmeal). Eat plenty of fresh fruits and vegetables with lean meats daily.  Do not skip meals. Eat a balanced portion size.  Avoid fad diets. Consider permanent healthy life style changes.      6. Cigarette nicotine dependence without complication  Smoking cessation advised. Instructed on smoking cessation program through [facility] and pharmacological interventions to aid in cessation.  >5 minutes allotted to educate patient on the harms of smoking, the urgency to quit, and the development of a plan for smoking cessation.     7. Vitamin D deficiency  Vitamin D level reviewed and is currently at goal,  between 30-80 ng/mL. Continue OTC Vitamin D3 2000 IU daily.   - Vitamin D; Future    Orders Placed This Encounter   Procedures    NEBULIZER KIT (SUPPLIES) FOR HOME USE    CBC Auto Differential    Comprehensive Metabolic Panel    Lipid Panel    Hemoglobin A1C    Urinalysis    Vitamin D    Microalbumin/Creatinine Ratio, Urine    Diabetic Eye Screening Photo         Current Outpatient Medications   Medication Instructions    albuterol (PROVENTIL) 2.5 mg, Nebulization, 4 times daily PRN    albuterol (PROVENTIL/VENTOLIN HFA) 90 mcg/actuation inhaler 1-2 puffs, Inhalation, Every 4 hours PRN    aspirin 81 mg, Oral, Daily    diclofenac sodium (VOLTAREN) 2 g, Topical (Top), 4 times daily PRN    lisinopriL (PRINIVIL,ZESTRIL) 2.5 mg, Oral, Daily    metFORMIN (GLUCOPHAGE) 500 mg, Oral, Daily    metoprolol succinate (TOPROL-XL) 25 mg, Oral, 2 times daily    nitroGLYCERIN (NITROSTAT) 0.4 mg, Sublingual    pravastatin (PRAVACHOL) 80 mg, Oral, Nightly    TRELEGY ELLIPTA 100-62.5-25 mcg DsDv 1 puff, Inhalation, Daily    vitamin D (VITAMIN D3) 2,000 Units, Oral, Daily       Future Appointments   Date Time Provider Department Center   5/2/2023  8:30 AM Cat Morin MD [facility] [place][note 3]   9/18/2023  9:30 AM PROVIDER, [facility]   10/10/2023  8:00 AM ANGELITO Soto [facility]        Follow up in about 6 months (around 10/11/2023) for lab review.    Labs thoroughly reviewed with patient. Medication refills addressed today.  RTC prn and 6 months, with labs 1 week prior to the apt.  COVID 19 precautions given to patient.  Patient voices understanding of all discharge instructions.      ANGELITO Soto

## 2023-05-02 ENCOUNTER — OFFICE VISIT (OUTPATIENT)
Dept: CARDIOLOGY | Facility: CLINIC | Age: 73
End: 2023-05-02
Payer: COMMERCIAL

## 2023-05-02 VITALS
WEIGHT: 151.38 LBS | SYSTOLIC BLOOD PRESSURE: 80 MMHG | RESPIRATION RATE: 20 BRPM | HEIGHT: 62 IN | TEMPERATURE: 98 F | BODY MASS INDEX: 27.86 KG/M2 | HEART RATE: 69 BPM | OXYGEN SATURATION: 97 % | DIASTOLIC BLOOD PRESSURE: 47 MMHG

## 2023-05-02 DIAGNOSIS — I10 PRIMARY HYPERTENSION: ICD-10-CM

## 2023-05-02 DIAGNOSIS — E78.2 MIXED HYPERLIPIDEMIA: Primary | ICD-10-CM

## 2023-05-02 DIAGNOSIS — N18.9 CHRONIC KIDNEY DISEASE, UNSPECIFIED CKD STAGE: ICD-10-CM

## 2023-05-02 DIAGNOSIS — E11.9 TYPE 2 DIABETES MELLITUS WITHOUT COMPLICATION, WITHOUT LONG-TERM CURRENT USE OF INSULIN: ICD-10-CM

## 2023-05-02 DIAGNOSIS — I47.10 SUPRAVENTRICULAR TACHYCARDIA: ICD-10-CM

## 2023-05-02 DIAGNOSIS — I50.20 HEART FAILURE WITH REDUCED EJECTION FRACTION: ICD-10-CM

## 2023-05-02 DIAGNOSIS — Z72.0 TOBACCO USER: ICD-10-CM

## 2023-05-02 PROCEDURE — 99214 OFFICE O/P EST MOD 30 MIN: CPT | Mod: PBBFAC | Performed by: STUDENT IN AN ORGANIZED HEALTH CARE EDUCATION/TRAINING PROGRAM

## 2023-05-02 RX ORDER — METOPROLOL SUCCINATE 25 MG/1
25 TABLET, EXTENDED RELEASE ORAL 2 TIMES DAILY
Qty: 180 TABLET | Refills: 3 | Status: SHIPPED | OUTPATIENT
Start: 2023-05-02 | End: 2023-12-11 | Stop reason: SDUPTHER

## 2023-05-02 RX ORDER — PRAVASTATIN SODIUM 80 MG/1
80 TABLET ORAL NIGHTLY
Qty: 90 TABLET | Refills: 3 | Status: SHIPPED | OUTPATIENT
Start: 2023-05-02 | End: 2024-03-07 | Stop reason: SDUPTHER

## 2023-05-02 RX ORDER — METOPROLOL SUCCINATE 50 MG/1
50 TABLET, EXTENDED RELEASE ORAL 2 TIMES DAILY
COMMUNITY
End: 2023-05-02 | Stop reason: SDUPTHER

## 2023-05-02 RX ORDER — NITROGLYCERIN 0.4 MG/1
0.4 TABLET SUBLINGUAL EVERY 5 MIN PRN
Qty: 25 TABLET | Refills: 3 | Status: SHIPPED | OUTPATIENT
Start: 2023-05-02 | End: 2024-03-07 | Stop reason: SDUPTHER

## 2023-05-02 RX ORDER — METOPROLOL SUCCINATE 50 MG/1
50 TABLET, EXTENDED RELEASE ORAL 2 TIMES DAILY
Qty: 180 TABLET | Refills: 3 | Status: SHIPPED | OUTPATIENT
Start: 2023-05-02 | End: 2023-12-11 | Stop reason: SDUPTHER

## 2023-05-02 NOTE — PATIENT INSTRUCTIONS
6 month follow up   Stop Lisinopril   Start Jardiance   Call clinic at [phone] with questions or concerns

## 2023-05-02 NOTE — PROGRESS NOTES
[facility]   Cardiology Clinic - Follow Up     Date of Visit: 5/2/2023  Reason for Visit/Chief Complaint:   Chief Complaint    f/u denies chest pain has COPD/SOB          I have explained to Ms. Fadumo Lynch that I am not a cardiologist. She understands that I am an internal medicine physician seeing patients in the cardiology clinic. Ms. Fadumo Lynch has expressed understanding of this fact and is willing to proceed with this visit.     The patient was discussed with the cardiologist at the time of the appointment.     History of Present Illness:      Fadumo Lynch is a 73 y.o. female with a PMH significant for HFimpEF, HTN, HLD, SVT, NIDDM, COPD, and tobacco use who presents to cardiology clinic for follow up.  Patient completed an Echocardiogram on 10.7.22 which revealed an EF of 55%.  See report below.  She completed an Exercise Nuclear Stress Test on Florina 15, 2021 which revealed no ischemia and no scar. Patient reports she is doing very well today. She reports taking Toprol 75mg every day and that this has resolved her SVT. She had one singular episode of chest tightness about a month ago that resolved after a few minutes (without SL Nitro). This was the only episode she has had since her last appointment. She reports she was sitting on her couch watching TV at the time of the episode. She reports her FRASER is at baseline. She is otherwise doing well and requesting q6 month follow-ups.     CP:  The patient has chest discomfort.      SOB:  The patient denies shortness of breath. Has FRASER    EDEMA:  The patient denies edema.      ORTHOPNEA:  The patient denies orthopnea.  No PND.      SYNCOPE:  The patient denies near syncope.  No syncope.   No dizziness.    PALPITATIONS:  The patient has no palpitations.    LEVEL OF EXERTION:  The patient does house work and does not have symptoms with this level of exertion.  The patient's level of exertion is adequate.    Past Medical  History:        Past Medical History:   Diagnosis Date    CKD (chronic kidney disease)     COPD (chronic obstructive pulmonary disease)     Diabetes mellitus     Hyperlipidemia     Hypertension     Supraventricular tachycardia        Surgical History:        Past Surgical History:   Procedure Laterality Date    TONSILLECTOMY AND ADENOIDECTOMY  1964    VAGINAL HYSTERECTOMY  1986       Family History:        Family History   Problem Relation Age of Onset    Colon cancer Mother     Cancer Father        Social History:        Social History     Tobacco Use    Smoking status: Every Day     Packs/day: 0.50     Years: 50.00     Pack years: 25.00     Types: Cigarettes    Smokeless tobacco: Never   Substance Use Topics    Alcohol use: Never    Drug use: Never       Allergies:         Review of patient's allergies indicates:   Allergen Reactions    Medrol [methylprednisolone] Itching    Naproxen Hives    Zinc sulfate      Other reaction(s): LATE EFFECT OF BURNS OF OTHER SPECIFIED SITES       Medications:        Current Outpatient Medications   Medication Sig Dispense Refill    albuterol (PROVENTIL) 2.5 mg /3 mL (0.083 %) nebulizer solution Take 3 mLs (2.5 mg total) by nebulization 4 (four) times daily as needed for Wheezing or Shortness of Breath. 120 each 6    albuterol (PROVENTIL/VENTOLIN HFA) 90 mcg/actuation inhaler Inhale 1-2 puffs into the lungs every 4 (four) hours as needed for Wheezing or Shortness of Breath. 18 g 4    aspirin 81 MG Chew Take 81 mg by mouth once daily.      diclofenac sodium (VOLTAREN) 1 % Gel Apply 2 g topically 4 (four) times daily as needed (hand pain). 100 g 2    lisinopriL (PRINIVIL,ZESTRIL) 2.5 MG tablet Take 1 tablet (2.5 mg total) by mouth once daily. 90 tablet 3    metFORMIN (GLUCOPHAGE) 500 MG tablet Take 1 tablet (500 mg total) by mouth once daily. 90 tablet 1    metoprolol succinate (TOPROL-XL) 25 MG 24 hr tablet Take 1 tablet (25 mg total) by mouth 2 (two) times daily. 180 tablet 2     metoprolol succinate (TOPROL-XL) 50 MG 24 hr tablet Take 50 mg by mouth 2 (two) times daily. ALONG WITH A 25MG TAB =75MG BID      nitroGLYCERIN (NITROSTAT) 0.4 MG SL tablet Place 0.4 mg under the tongue.      pravastatin (PRAVACHOL) 80 MG tablet Take 1 tablet (80 mg total) by mouth every evening. 90 tablet 3    TRELEGY ELLIPTA 100-62.5-25 mcg DsDv Inhale 1 puff into the lungs once daily. 1 each 6    vitamin D (VITAMIN D3) 1000 units Tab Take 2,000 Units by mouth once daily.       No current facility-administered medications for this visit.       I have reviewed and updated the patient's medications, allergies, past medical history, surgical history, social history and family history as needed.    Review of Systems:      Review of Systems   Constitutional:  Negative for chills, diaphoresis and fever.   HENT:  Negative for hearing loss and nosebleeds.    Eyes:  Negative for blurred vision.   Respiratory:  Negative for shortness of breath.    Cardiovascular:  Negative for chest pain, palpitations, orthopnea, claudication and PND.   Gastrointestinal:  Negative for nausea and vomiting.   Genitourinary:  Negative for dysuria.   Musculoskeletal:  Negative for myalgias.   Skin:  Negative for rash.   Neurological:  Negative for dizziness and headaches.     Objective:        Vitals:    05/02/23 0828   BP: (!) 80/47   Pulse: 69   Resp: 20   Temp: 98.4 °F (36.9 °C)     Wt Readings from Last 3 Encounters:   05/02/23 68.7 kg (151 lb 6.4 oz)   04/11/23 71.3 kg (157 lb 3.2 oz)   01/02/23 70.8 kg (156 lb)     Temp Readings from Last 3 Encounters:   05/02/23 98.4 °F (36.9 °C) (Oral)   04/11/23 98.4 °F (36.9 °C) (Oral)   01/02/23 98.8 °F (37.1 °C) (Oral)     BP Readings from Last 3 Encounters:   05/02/23 (!) 80/47   04/11/23 104/60   01/02/23 (!) 100/57     Pulse Readings from Last 3 Encounters:   05/02/23 69   04/11/23 76   01/02/23 69       Vitals:    05/02/23 0828   BP: (!) 80/47   BP Location: Left arm   Patient Position: Sitting  "  BP Method: Medium (Automatic)   Pulse: 69   Resp: 20   Temp: 98.4 °F (36.9 °C)   TempSrc: Oral   SpO2: 97%   Weight: 68.7 kg (151 lb 6.4 oz)   Height: 5' 2" (1.575 m)     Body mass index is 27.69 kg/m².    Physical Exam  Constitutional:       Appearance: She is well-developed.   HENT:      Head: Normocephalic and atraumatic.   Eyes:      Conjunctiva/sclera: Conjunctivae normal.   Neck:      Vascular: No carotid bruit or JVD.   Cardiovascular:      Rate and Rhythm: Normal rate and regular rhythm.      Chest Wall: PMI is not displaced.      Pulses: Normal pulses and intact distal pulses.      Heart sounds: No midsystolic click. No murmur heard.    No friction rub. No gallop.   Pulmonary:      Effort: Pulmonary effort is normal.      Breath sounds: Normal breath sounds.   Abdominal:      General: Abdomen is flat. Bowel sounds are normal.   Musculoskeletal:      Right lower leg: No edema.      Left lower leg: No edema.   Skin:     General: Skin is warm and dry.   Neurological:      Mental Status: She is alert. Mental status is at baseline.   Psychiatric:         Mood and Affect: Mood normal.         Behavior: Behavior normal. Behavior is cooperative.         Judgment: Judgment normal.        Labs:      I have reviewed the following labs below:      CBC:  Lab Results   Component Value Date    WBC 11.0 04/04/2023    HGB 13.5 04/04/2023    HCT 41.9 04/04/2023     04/04/2023    MCV 93.9 04/04/2023    RDW 13.1 04/04/2023     BMP:  Lab Results   Component Value Date     04/04/2023    K 4.4 04/04/2023    CO2 31 04/04/2023    BUN 6.3 (L) 04/04/2023    CALCIUM 9.2 04/04/2023    MG 2.10 12/26/2022    PHOS 3.3 12/26/2022     LFTs:  Lab Results   Component Value Date    ALBUMIN 3.4 04/04/2023    BILITOT 0.5 04/04/2023    AST 15 04/04/2023    ALKPHOS 99 04/04/2023    ALT 14 04/04/2023     FLP:  Cholesterol Total   Date Value Ref Range Status   04/04/2023 137 <=200 mg/dL Final     HDL Cholesterol   Date Value Ref Range " Status   04/04/2023 40 35 - 60 mg/dL Final     LDL Cholesterol   Date Value Ref Range Status   04/04/2023 67.00 50.00 - 140.00 mg/dL Final     Triglyceride   Date Value Ref Range Status   04/04/2023 149 (H) 37 - 140 mg/dL Final     DM:  Lab Results   Component Value Date    HGBA1C 5.8 04/04/2023    HGBA1C 5.8 10/04/2022    HGBA1C 5.8 07/06/2022    CREATININE 0.86 04/04/2023     Thyroid:  Lab Results   Component Value Date    TSH 0.733 12/26/2022     Anemia:No results found for: IRON, TIBC, FERRITIN, [id number], FOLATE  Coags:  Lab Results   Component Value Date    INR 0.93 06/03/2021    PROTIME 12.3 06/03/2021      Cardiac:  Lab Results   Component Value Date    TROPONINI <0.010 06/03/2021    TROPONINI <0.010 12/27/2020    BNP 30.4 06/03/2021    BNP 23.1 12/27/2020       Cardiac Studies/Imaging:        I have reviewed the following studies below:      Echocardiogram  Results for orders placed during the hospital encounter of 10/07/22  Echo  Interpretation Summary  · Normal systolic function.  · The estimated ejection fraction is 55%.  · Normal right ventricular size with normal right ventricular systolic function.  · Normal central venous pressure (3 mmHg).  · The estimated PA systolic pressure is 12 mmHg.  · IVC is normal.      Exercise nuclear stress test Florina 15, 2021:  No ischemia  No scar      Assessment & Plan:      73 y.o. female with the following medical problems:    Heart Failure with Reduced Ejection Fraction   - HF Status: NYHA Class II, ACC/AHA Stage C  - LVEF: 55% (TTE on 10/7/2022) - previously 40% on 9/15/2020  - Current GDMT:  Lisinopril 2.5mg , Metoprolol Succinate 75mg  - given hypotension, will STOP lisinopril and start  Jardiance 10mg  - Continue Low Na Diet   - Patient instructed to take BP if she becomes symptomatic   - Strict Is/Os and daily weights    Chest Pain- resolved  - Exercise Nuclear Stress Test 6.15.21: No ischemia   - Continue Aspirin 81mg daily and SL Nitro prn CP    Hx of HTN  (hypertension)  - BP at goal - 80/47  - Continue Toprol XL   - will stop Lisinopril given hypotension  - Counseled on low salt diet    SVT (supraventricular tachycardia)   - Continue Toprol XL 75mg daily    DM2 (type 2 diabetes mellitus)  - Management per PCP    Hyperlipidemia   - LDL 67 - at goal   - Continue Pravastatin 80mg daily    COPD  - Management per PCP  - Strongly encouraged smoking cessation    Tobacco Use  - Counseled on smoking cessation       Return to clinic in 6 months.      Future Appointments   Date Time Provider Department Center   9/18/2023  9:30 AM PROVIDER, [facility] PULMONOLOGY [facility] [place]    10/10/2023  8:00 AM [facility] [place] [facility]         Danielle Krishnan DO  Internal Medicine Physician   Department of Medicine   [facility]    05/02/2023 9:02 AM

## 2023-05-05 ENCOUNTER — HOSPITAL ENCOUNTER (EMERGENCY)
Facility: HOSPITAL | Age: 73
Discharge: HOME | End: 2023-05-05
Attending: FAMILY MEDICINE
Payer: COMMERCIAL

## 2023-05-05 VITALS
DIASTOLIC BLOOD PRESSURE: 71 MMHG | HEART RATE: 66 BPM | SYSTOLIC BLOOD PRESSURE: 132 MMHG | TEMPERATURE: 98 F | OXYGEN SATURATION: 96 % | RESPIRATION RATE: 18 BRPM

## 2023-05-05 DIAGNOSIS — E16.2 HYPOGLYCEMIA: Primary | ICD-10-CM

## 2023-05-05 DIAGNOSIS — R07.9 CHEST PAIN: ICD-10-CM

## 2023-05-05 LAB
ALBUMIN SERPL-MCNC: 3.7 G/DL (ref 3.4–4.8)
ALBUMIN/GLOB SERPL: 0.9 RATIO (ref 1.1–2)
ALP SERPL-CCNC: 112 UNIT/L (ref 40–150)
ALT SERPL-CCNC: 18 UNIT/L (ref 0–55)
APPEARANCE UR: CLEAR
AST SERPL-CCNC: 18 UNIT/L (ref 5–34)
BACTERIA #/AREA URNS AUTO: ABNORMAL /HPF
BASOPHILS # BLD AUTO: 0.07 X10(3)/MCL
BASOPHILS NFR BLD AUTO: 0.6 %
BILIRUB UR QL STRIP.AUTO: NEGATIVE MG/DL
BILIRUBIN DIRECT+TOT PNL SERPL-MCNC: 0.4 MG/DL
BUN SERPL-MCNC: 9.3 MG/DL (ref 9.8–20.1)
CALCIUM SERPL-MCNC: 9.7 MG/DL (ref 8.4–10.2)
CASTS URNS MICRO: ABNORMAL /LPF
CHLORIDE SERPL-SCNC: 103 MMOL/L (ref 98–107)
CO2 SERPL-SCNC: 25 MMOL/L (ref 23–31)
COLOR UR AUTO: ABNORMAL
CREAT SERPL-MCNC: 0.91 MG/DL (ref 0.55–1.02)
EOSINOPHIL # BLD AUTO: 0.36 X10(3)/MCL (ref 0–0.9)
EOSINOPHIL NFR BLD AUTO: 3.1 %
ERYTHROCYTE [DISTWIDTH] IN BLOOD BY AUTOMATED COUNT: 12.8 % (ref 11.5–17)
GFR SERPLBLD CREATININE-BSD FMLA CKD-EPI: >60 MLS/MIN/1.73/M2
GLOBULIN SER-MCNC: 4.2 GM/DL (ref 2.4–3.5)
GLUCOSE SERPL-MCNC: 75 MG/DL (ref 82–115)
GLUCOSE UR QL STRIP.AUTO: ABNORMAL MG/DL
HCT VFR BLD AUTO: 43.4 % (ref 37–47)
HGB BLD-MCNC: 14.1 G/DL (ref 12–16)
HYALINE CASTS #/AREA URNS LPF: ABNORMAL /LPF
IMM GRANULOCYTES # BLD AUTO: 0.03 X10(3)/MCL (ref 0–0.04)
IMM GRANULOCYTES NFR BLD AUTO: 0.3 %
KETONES UR QL STRIP.AUTO: NEGATIVE MG/DL
LEUKOCYTE ESTERASE UR QL STRIP.AUTO: NEGATIVE UNIT/L
LYMPHOCYTES # BLD AUTO: 3.59 X10(3)/MCL (ref 0.6–4.6)
LYMPHOCYTES NFR BLD AUTO: 30.7 %
MCH RBC QN AUTO: 30.1 PG (ref 27–31)
MCHC RBC AUTO-ENTMCNC: 32.5 G/DL (ref 33–36)
MCV RBC AUTO: 92.5 FL (ref 80–94)
MONOCYTES # BLD AUTO: 0.75 X10(3)/MCL (ref 0.1–1.3)
MONOCYTES NFR BLD AUTO: 6.4 %
MUCOUS THREADS URNS QL MICRO: ABNORMAL /LPF
NEUTROPHILS # BLD AUTO: 6.89 X10(3)/MCL (ref 2.1–9.2)
NEUTROPHILS NFR BLD AUTO: 58.9 %
NITRITE UR QL STRIP.AUTO: NEGATIVE
NRBC BLD AUTO-RTO: 0 %
PH UR STRIP.AUTO: 6.5 [PH]
PLATELET # BLD AUTO: 338 X10(3)/MCL (ref 130–400)
PMV BLD AUTO: 9.3 FL (ref 7.4–10.4)
POCT GLUCOSE: 66 MG/DL (ref 70–110)
POCT GLUCOSE: 95 MG/DL (ref 70–110)
POTASSIUM SERPL-SCNC: 4.4 MMOL/L (ref 3.5–5.1)
PROT SERPL-MCNC: 7.9 GM/DL (ref 5.8–7.6)
PROT UR QL STRIP.AUTO: ABNORMAL MG/DL
RBC # BLD AUTO: 4.69 X10(6)/MCL (ref 4.2–5.4)
RBC #/AREA URNS AUTO: ABNORMAL /HPF
RBC UR QL AUTO: NEGATIVE UNIT/L
SODIUM SERPL-SCNC: 138 MMOL/L (ref 136–145)
SP GR UR STRIP.AUTO: 1.03
SQUAMOUS #/AREA URNS LPF: ABNORMAL /HPF
TROPONIN I SERPL-MCNC: <0.01 NG/ML (ref 0–0.04)
UROBILINOGEN UR STRIP-ACNC: ABNORMAL MG/DL
WBC # SPEC AUTO: 11.69 X10(3)/MCL (ref 4.5–11.5)
WBC #/AREA URNS AUTO: ABNORMAL /HPF

## 2023-05-05 PROCEDURE — 93005 ELECTROCARDIOGRAM TRACING: CPT

## 2023-05-05 PROCEDURE — 84484 ASSAY OF TROPONIN QUANT: CPT | Performed by: NURSE PRACTITIONER

## 2023-05-05 PROCEDURE — 85025 COMPLETE CBC W/AUTO DIFF WBC: CPT | Performed by: NURSE PRACTITIONER

## 2023-05-05 PROCEDURE — 80053 COMPREHEN METABOLIC PANEL: CPT | Performed by: NURSE PRACTITIONER

## 2023-05-05 PROCEDURE — 82962 GLUCOSE BLOOD TEST: CPT | Mod: 91

## 2023-05-05 PROCEDURE — 81001 URINALYSIS AUTO W/SCOPE: CPT | Performed by: NURSE PRACTITIONER

## 2023-05-05 PROCEDURE — 99285 EMERGENCY DEPT VISIT HI MDM: CPT | Mod: 25

## 2023-05-05 RX ORDER — LANCETS
EACH MISCELLANEOUS
Qty: 100 EACH | Refills: 0 | Status: SHIPPED | OUTPATIENT
Start: 2023-05-05

## 2023-05-05 RX ORDER — INSULIN PUMP SYRINGE, 3 ML
EACH MISCELLANEOUS
Qty: 1 EACH | Refills: 0 | Status: SHIPPED | OUTPATIENT
Start: 2023-05-05

## 2023-05-05 NOTE — ED PROVIDER NOTES
"Name: Fadumo Lynch   Age: 73 y.o.  Sex: female    Chief complaint:   Chief Complaint   Patient presents with    Chest Pain    Shortness of Breath     CO CP AND SOB UPON EXERTION WORSE SINCE WED.  DID NOT TAKE ANY NITRO, STATES PAIN WAS NOT BAD ENOUGH. NO PAIN AT PRESENT, BUT STATES "FEELS FUNNY"  EKG OBTAINED.  CBG 66.  PT GIVEN OJ.     Hypoglycemia      Patient arrived with: Private  History obtained from: Patient    Subjective:   73-year-old female with a past medical history of hypertension, hyperlipidemia, diabetes, CKD, SVT that presents to emergency department for chest pain and shortness of breath.  Patient said she was recently seen a new physician and they started her on Jardiance.  She was unsure why.  She started this approximately 4 days ago and has been having symptoms since that time p.o. will have intermittent chest pain that is a dull soreness like sensation that waxes and wanes and is nonradiating.  Intermittent shortness of breath.  Has not had any syncopal episodes.  Denies fever, chills, sweats, cough, sore throat, runny nose, abdominal pain, nausea, vomiting, diarrhea, dysuria, hematuria.    Past Medical History:   Diagnosis Date    CKD (chronic kidney disease)     COPD (chronic obstructive pulmonary disease)     Diabetes mellitus     Hyperlipidemia     Hypertension     Supraventricular tachycardia      Past Surgical History:   Procedure Laterality Date    TONSILLECTOMY AND ADENOIDECTOMY  1964    VAGINAL HYSTERECTOMY  1986     Social History     Socioeconomic History    Marital status:     Number of children: 2   Occupational History    Occupation: Retired   Tobacco Use    Smoking status: Every Day     Packs/day: 0.50     Years: 50.00     Pack years: 25.00     Types: Cigarettes    Smokeless tobacco: Never   Substance and Sexual Activity    Alcohol use: Never    Drug use: Never     Review of patient's allergies indicates:   Allergen Reactions    Medrol [methylprednisolone] Itching    " Naproxen Hives    Zinc sulfate      Other reaction(s): LATE EFFECT OF BURNS OF OTHER SPECIFIED SITES        Review of Systems   Constitutional:  Negative for diaphoresis and fever.   HENT:  Negative for congestion and sore throat.    Eyes:  Negative for pain and discharge.   Respiratory:  Positive for shortness of breath (resolved). Negative for cough.    Cardiovascular:  Positive for chest pain (resolved). Negative for palpitations.   Gastrointestinal:  Negative for diarrhea and vomiting.   Genitourinary:  Negative for dysuria and hematuria.   Musculoskeletal:  Negative for back pain and myalgias.   Skin:  Negative for itching and rash.   Neurological:  Negative for weakness and headaches.        Objective:     Vitals:    05/05/23 1738   BP: 132/71   Pulse:    Resp:    Temp:         Physical Exam  Constitutional:       Appearance: She is not toxic-appearing.   HENT:      Head: Normocephalic and atraumatic.   Cardiovascular:      Rate and Rhythm: Regular rhythm.      Pulses: Normal pulses.   Pulmonary:      Effort: Pulmonary effort is normal.      Breath sounds: Normal breath sounds.   Abdominal:      General: Abdomen is flat. Bowel sounds are normal.      Palpations: Abdomen is soft.   Musculoskeletal:         General: No deformity. Normal range of motion.      Cervical back: Normal range of motion and neck supple.   Skin:     General: Skin is warm and dry.   Neurological:      General: No focal deficit present.      Mental Status: She is alert and oriented to person, place, and time.   Psychiatric:         Mood and Affect: Mood normal.         Behavior: Behavior normal.        Records:  Nursing records and triage records reviewed  Prior records reviewed    Medical decision making:   Presents to emergency department for chest pain and shortness of breath that is now resolved.  When patient was seen by EMS she was hyperglycemic with a blood sugar in the 60s.  She received dextrose fluids and orange juice per EMS.   With the time patient arrived inside the emergency department she said all her symptoms are now resolved.  She believes that starting the Jardiance is been all her symptoms started.  Physical exam and vital signs within normal limits.  Low suspicion for ACS but will get labs for further evaluation.    ED Course as of 05/05/23 1738   Fri May 05, 2023   1643 EKG is nonischemic [RK]   1719 No leukocytosis, anemia, thrombocytopenia.  No severe electrolyte abnormality.  Glucose 75, patient has eaten after this will get a repeat Accu-Chek.  No YUSUF.  Liver enzymes are within normal limits.  UA shows no signs of urinary tract infection.  My interpretation of chest x-ray shows no acute findings [RK]   1733 Troponin negative. [RK]   1735 Patient's symptoms have only started since sees started the Jardiance, I requested that the patient stop taking this medicine.  Continue taking her metformin.  Otherwise she will not regularly check her blood sugar.  I ordered supplies for.      Can follow-up with primary care doctor for re-evaluation.  We discussed return precautions and I listed them of the discharge instructions.  Patient understands the plan, no further questions, felt safe for discharge. [RK]      ED Course User Index  [RK] Rom Rodríguez MD          EKG:  ECG Results              EKG 12-lead (Chest Pain) Age >30 (Preliminary result)  Result time 05/05/23 16:43:05      Wet Read by Rom Rodríguez MD (05/05/23 16:43:05, [facility] Emergency Dept, Emergency Medicine)    Normal sinus rhythm at a rate of 72, no signs of ST elevation or depression, normal axis, normal intervals with a QTC of 442                      In process by Interface, Lab In [facility] (05/05/23 16:34:56)                   Narrative:    Test Reason : R07.9,    Vent. Rate : 072 BPM     Atrial Rate : 072 BPM     P-R Int : 108 ms          QRS Dur : 056 ms      QT Int : 404 ms       P-R-T Axes : 070 073 066 degrees     QTc Int : 442  ms    Sinus rhythm with short SD  Low voltage QRS  Borderline Abnormal ECG  When compared with ECG of 26-DEC-2022 10:30,  Non-specific change in ST segment in Lateral leads    Referred By:             Confirmed By:                                      Procedures       Diagnosis:  Final diagnoses:  [R07.9] Chest pain  [E16.2] Hypoglycemia (Primary)     ED Prescriptions       Medication Sig Dispense Start Date End Date Auth. Provider    blood-glucose meter kit To check BG 2 times daily, to use with insurance preferred meter 1 each 5/5/2023 -- Rom Rodríguez MD    lancets Misc To check BG 2 times daily, to use with insurance preferred meter 100 each 5/5/2023 -- Rom Rodríguez MD    blood sugar diagnostic Strp To check BG 2 times daily, to use with insurance preferred meter 100 each 5/5/2023 -- Rom Rodríguez MD          Follow-up Information       Follow up With Specialties Details Why Contact Info    ANGELITO Soto Nurse Practitioner Call in 1 week For re-evaluation [address] [facility] [id number] [phone]              Rom Rodríguez M.D.  Emergency Medicine Physician     (Please note that this chart was completed via voice to text dictation. There may be typographical errors or substitutions that are unintentional, or uncorrected. Every attempt was made to proofread the chart prior to completion. If there are any questions, please contact the physician for final clarification).         Rom Rodríguez MD  05/05/23 2027

## 2023-05-05 NOTE — FIRST PROVIDER EVALUATION
Medical screening examination initiated.  I have conducted a focused provider triage encounter, findings are as follows:    Brief history of present illness:  chest pain    Vitals:    05/05/23 1625   BP: 127/65   BP Location: Left arm   Patient Position: Sitting   Pulse: 81   Resp: 18   Temp: 97.9 °F (36.6 °C)   TempSrc: Tympanic   SpO2: 96%       Pertinent physical exam:  deferred    Brief workup plan:  xray, ekg, labwork    Preliminary workup initiated; this workup will be continued and followed by the physician or advanced practice provider that is assigned to the patient when roomed.

## 2023-05-05 NOTE — DISCHARGE INSTRUCTIONS
You came into emergency department for chest pain and shortness of breath related to your low blood sugar.  Your EKG, labs, chest x-ray were normal.  Please stop taking your Jardiance.  You can continue taking her metformin.  I otherwise ordered diabetes supplies for you, please start checking your blood sugar daily, I recommend at least 2 times per day.      Please follow-up with your primary care doctor for re-evaluation.  Return to the emergency department for chest pain, trouble breathing, feel lightheaded or dizzy like you may pass out.

## 2023-05-09 ENCOUNTER — TELEPHONE (OUTPATIENT)
Dept: CARDIOLOGY | Facility: CLINIC | Age: 73
End: 2023-05-09
Payer: COMMERCIAL

## 2023-05-09 NOTE — TELEPHONE ENCOUNTER
Patient walked to clinic with complaints that when she started a new medication of Jardiance 10mg and stopped Lisinopril 2.5mg she ended up in the ED with shakiness, lightheadedness, chest pain, low blood glucose. Patient is requesting to stop Jardiance due to side effects, recent vitals have been 80/47, 104/60,100/67.   Spoke with Dr. Townsend.  New orders noted, instructed patient to discontinue Jardiance and do not restart previous medication of lisinopril.  Instructed to continue to monitor her BP, and keep all appointments. Patient verbalized understanding

## 2023-05-25 ENCOUNTER — OFFICE VISIT (OUTPATIENT)
Dept: URGENT CARE | Facility: CLINIC | Age: 73
End: 2023-05-25
Payer: COMMERCIAL

## 2023-05-25 VITALS
OXYGEN SATURATION: 95 % | HEIGHT: 61 IN | HEART RATE: 65 BPM | SYSTOLIC BLOOD PRESSURE: 144 MMHG | DIASTOLIC BLOOD PRESSURE: 73 MMHG | TEMPERATURE: 99 F | RESPIRATION RATE: 22 BRPM | BODY MASS INDEX: 29.84 KG/M2 | WEIGHT: 158.06 LBS

## 2023-05-25 DIAGNOSIS — R21 RASH: Primary | ICD-10-CM

## 2023-05-25 PROCEDURE — 99213 OFFICE O/P EST LOW 20 MIN: CPT | Mod: S$PBB,,, | Performed by: FAMILY MEDICINE

## 2023-05-25 PROCEDURE — 99213 PR OFFICE/OUTPT VISIT, EST, LEVL III, 20-29 MIN: ICD-10-PCS | Mod: S$PBB,,, | Performed by: FAMILY MEDICINE

## 2023-05-25 PROCEDURE — 99215 OFFICE O/P EST HI 40 MIN: CPT | Mod: PBBFAC | Performed by: FAMILY MEDICINE

## 2023-05-25 RX ORDER — TRIAMCINOLONE ACETONIDE 1 MG/G
CREAM TOPICAL 2 TIMES DAILY
Qty: 30 G | Refills: 1 | Status: SHIPPED | OUTPATIENT
Start: 2023-05-25

## 2023-05-25 RX ORDER — LANCETS 30 GAUGE
EACH MISCELLANEOUS
COMMUNITY
Start: 2023-05-06

## 2023-05-25 NOTE — PROGRESS NOTES
"Subjective:      Patient ID: Fadumo Lynch is a 73 y.o. female.    Vitals:  height is 5' 1.42" (1.56 m) and weight is 71.7 kg (158 lb 1.1 oz). Her temperature is 99 °F (37.2 °C). Her blood pressure is 144/73 (abnormal) and her pulse is 65. Her respiration is 22 (abnormal) and oxygen saturation is 95%.     Chief Complaint: Rash (Rt upper abd/rib area x 2wks)    Patient with 2 weeks of pruritic rash under right breast, no pain, no vesicles.  States she got into poison ivy and thinks that is what it is.  No systemic symptoms.    Rash      Skin:  Positive for rash.    Objective:     Physical Exam   Constitutional: She appears well-developed.  Non-toxic appearance. She does not appear ill. No distress.   HENT:   Head: Atraumatic.   Cardiovascular: Normal rate.   Pulmonary/Chest: No respiratory distress.   Abdominal: Soft. There is no abdominal tenderness.   Skin: Skin is warm, dry and not diaphoretic. Capillary refill takes less than 2 seconds.         Comments: Scaly plaque under right lateral breast, no central clearing, no vesicles or ulceration.  No surrounding erythema.     Assessment:     1. Rash        Plan:   Eczematous type changes right breast, could be contact.  Triamcinolone cream as directed.  Return for recheck if not improving in 1 week, immediately if any worsening    Rash  -     triamcinolone acetonide 0.1% (KENALOG) 0.1 % cream; Apply topically 2 (two) times daily. Use as directed  Dispense: 30 g; Refill: 1                    "

## 2023-07-25 ENCOUNTER — TELEPHONE (OUTPATIENT)
Dept: INTERNAL MEDICINE | Facility: CLINIC | Age: 73
End: 2023-07-25
Payer: COMMERCIAL

## 2023-07-25 NOTE — TELEPHONE ENCOUNTER
----- Message from ANGELITO Soto sent at 4/11/2023  8:59 AM CDT -----  Please contact; [facility], Sangita mooney, please request diabetic eye exam and scan to chart to meet metric. thanks

## 2023-08-31 ENCOUNTER — OFFICE VISIT (OUTPATIENT)
Dept: URGENT CARE | Facility: CLINIC | Age: 73
End: 2023-08-31
Payer: MEDICARE

## 2023-08-31 VITALS
HEIGHT: 61 IN | DIASTOLIC BLOOD PRESSURE: 66 MMHG | TEMPERATURE: 98 F | BODY MASS INDEX: 29.86 KG/M2 | HEART RATE: 69 BPM | WEIGHT: 158.13 LBS | RESPIRATION RATE: 20 BRPM | OXYGEN SATURATION: 93 % | SYSTOLIC BLOOD PRESSURE: 146 MMHG

## 2023-08-31 DIAGNOSIS — H66.92 LEFT OTITIS MEDIA, UNSPECIFIED OTITIS MEDIA TYPE: Primary | ICD-10-CM

## 2023-08-31 PROCEDURE — 99213 PR OFFICE/OUTPT VISIT, EST, LEVL III, 20-29 MIN: ICD-10-PCS | Mod: S$PBB,,, | Performed by: FAMILY MEDICINE

## 2023-08-31 PROCEDURE — 99214 OFFICE O/P EST MOD 30 MIN: CPT | Mod: PBBFAC | Performed by: FAMILY MEDICINE

## 2023-08-31 PROCEDURE — 99213 OFFICE O/P EST LOW 20 MIN: CPT | Mod: S$PBB,,, | Performed by: FAMILY MEDICINE

## 2023-08-31 RX ORDER — AMOXICILLIN AND CLAVULANATE POTASSIUM 875; 125 MG/1; MG/1
1 TABLET, FILM COATED ORAL 2 TIMES DAILY
Qty: 20 TABLET | Refills: 0 | Status: SHIPPED | OUTPATIENT
Start: 2023-08-31 | End: 2023-09-07

## 2023-08-31 NOTE — PROGRESS NOTES
"Subjective:       Patient ID: Fadumo Lynch is a 73 y.o. female.    Vitals:  height is 5' 1.42" (1.56 m) and weight is 71.7 kg (158 lb 1.6 oz). Her temperature is 98.2 °F (36.8 °C). Her blood pressure is 146/66 (abnormal) and her pulse is 69. Her respiration is 20 and oxygen saturation is 93% (abnormal).     Chief Complaint: Sore Throat (Sore throat, Lt ear pain x 1 week. Declines testing until post assessment.)    Patient with several days of left ear pain, waxes and wanes, scratchy throat.  No cough, no fever.  States sore throat is mild.  Declines testing at this point.        ROS  All other systems are negative    Objective:   Physical Exam   Constitutional: She appears well-developed.  Non-toxic appearance. She does not appear ill. No distress.   HENT:   Head: Atraumatic.   Ears:   Right Ear: Tympanic membrane normal.   Left Ear: Ear canal normal.   Nose: No purulent discharge. Right sinus exhibits no maxillary sinus tenderness and no frontal sinus tenderness. Left sinus exhibits no maxillary sinus tenderness and no frontal sinus tenderness.   Mouth/Throat: No oropharyngeal exudate or posterior oropharyngeal erythema.   Left TM is dull, faintly injected.  EAC is clear.  Right ear is within normal limits      Comments: Left TM is dull, faintly injected.  EAC is clear.  Right ear is within normal limits  Eyes: Right eye exhibits no discharge. Left eye exhibits no discharge. Extraocular movement intact   Neck: Neck supple.   Cardiovascular: Regular rhythm.   Pulmonary/Chest: Effort normal and breath sounds normal. No respiratory distress. She has no wheezes. She has no rales.   Lymphadenopathy:     She has no cervical adenopathy.   Neurological: She is alert.   Skin: Skin is warm, dry and not diaphoretic.   Psychiatric: Her behavior is normal.   Nursing note and vitals reviewed.        Assessment:     1. Left otitis media, unspecified otitis media type            Plan:   Will treat as left otitis media.  " Augmentin.  Encouraged patient to monitor symptoms, use Tylenol for discomfort.  Please follow instructions on patient education material.  Return to urgent care in 2 to 3 days if symptoms are not improving. Seek care immediately if new or worsening symptoms develop.      Left otitis media, unspecified otitis media type  -     amoxicillin-clavulanate 875-125mg (AUGMENTIN) 875-125 mg per tablet; Take 1 tablet by mouth 2 (two) times daily. for 10 days  Dispense: 20 tablet; Refill: 0        Please note: This chart was completed via voice to text dictation. It may contain typographical/word recognition errors. If there are any questions, please contact the provider for final clarification.

## 2023-09-05 ENCOUNTER — HOSPITAL ENCOUNTER (OUTPATIENT)
Dept: RADIOLOGY | Facility: HOSPITAL | Age: 73
Discharge: HOME | End: 2023-09-05
Attending: INTERNAL MEDICINE
Payer: MEDICARE

## 2023-09-05 DIAGNOSIS — J44.9 CHRONIC OBSTRUCTIVE PULMONARY DISEASE, UNSPECIFIED COPD TYPE: ICD-10-CM

## 2023-09-05 DIAGNOSIS — R91.8 LUNG MASS: ICD-10-CM

## 2023-09-05 LAB
CREAT SERPL-MCNC: 0.82 MG/DL (ref 0.55–1.02)
GFR SERPLBLD CREATININE-BSD FMLA CKD-EPI: >60 MLS/MIN/1.73/M2

## 2023-09-05 PROCEDURE — 71260 CT THORAX DX C+: CPT | Mod: TC

## 2023-09-05 PROCEDURE — 82565 ASSAY OF CREATININE: CPT | Performed by: INTERNAL MEDICINE

## 2023-09-05 PROCEDURE — 25500020 PHARM REV CODE 255: Performed by: INTERNAL MEDICINE

## 2023-09-05 RX ADMIN — IOHEXOL 100 ML: 350 INJECTION, SOLUTION INTRAVENOUS at 12:09

## 2023-09-06 ENCOUNTER — OFFICE VISIT (OUTPATIENT)
Dept: URGENT CARE | Facility: CLINIC | Age: 73
End: 2023-09-06
Payer: MEDICARE

## 2023-09-06 VITALS
SYSTOLIC BLOOD PRESSURE: 109 MMHG | RESPIRATION RATE: 20 BRPM | HEART RATE: 75 BPM | OXYGEN SATURATION: 92 % | DIASTOLIC BLOOD PRESSURE: 67 MMHG | TEMPERATURE: 99 F | HEIGHT: 61 IN | WEIGHT: 158 LBS | BODY MASS INDEX: 29.83 KG/M2

## 2023-09-06 DIAGNOSIS — H92.02 OTALGIA OF LEFT EAR: Primary | ICD-10-CM

## 2023-09-06 PROCEDURE — 99213 OFFICE O/P EST LOW 20 MIN: CPT | Mod: ,,,

## 2023-09-06 PROCEDURE — 99213 PR OFFICE/OUTPT VISIT, EST, LEVL III, 20-29 MIN: ICD-10-PCS | Mod: ,,,

## 2023-09-06 RX ORDER — PREDNISONE 20 MG/1
20 TABLET ORAL DAILY
Qty: 5 TABLET | Refills: 0 | Status: SHIPPED | OUTPATIENT
Start: 2023-09-06 | End: 2023-09-11

## 2023-09-06 NOTE — PROGRESS NOTES
"Subjective:      Patient ID: Fadumo Lynch is a 73 y.o. female.    Vitals:  height is 5' 1" (1.549 m) and weight is 71.7 kg (158 lb). Her temperature is 99.2 °F (37.3 °C). Her blood pressure is 109/67 and her pulse is 75. Her respiration is 20 and oxygen saturation is 92% (abnormal).     Chief Complaint: Sore Throat ( Patient is a 73 y.o. female who presents to urgent care with complaints of sore throat and ear ache x7 days. Alleviating factors include amox-clav 875-125MG with no relief. Patient started taking 7 days ago and is now having diarrhea with belly cramps and no relief from ear pain and sore throat. )    Patient is a 73-year-old female complaining of sore throat, left otalgia for the last 2 weeks.  Patient was seen at another urgent care clinic 1 week ago and prescribed Augmentin.  Patient reports no relief wit Augmentin however she is now having diarrhea and nausea due to antibiotic.  Patient denies fever, cough, neck stiffness, rash, vomiting, abdominal pain.  Patient denies hematochezia hematemesis.    Sore Throat   Associated symptoms include ear pain.       HENT:  Positive for ear pain and sore throat.       Objective:     Physical Exam   Constitutional: She is oriented to person, place, and time. She appears well-developed. She is cooperative.  Non-toxic appearance. She does not appear ill. No distress.   HENT:   Head: Normocephalic and atraumatic.   Ears:   Right Ear: Hearing, tympanic membrane, external ear and ear canal normal.   Left Ear: Hearing, external ear and ear canal normal.      Comments: Left TM:  Dull, tympanosclerosis, no erythema, bulging or purulence noted.  Nose: Nose normal. No mucosal edema, rhinorrhea or nasal deformity. No epistaxis. Right sinus exhibits no maxillary sinus tenderness and no frontal sinus tenderness. Left sinus exhibits no maxillary sinus tenderness and no frontal sinus tenderness.   Mouth/Throat: Uvula is midline, oropharynx is clear and moist and mucous " membranes are normal. Mucous membranes are moist. No trismus in the jaw. Normal dentition. No uvula swelling. No oropharyngeal exudate, posterior oropharyngeal edema or posterior oropharyngeal erythema.   Eyes: Conjunctivae and lids are normal. No scleral icterus.   Neck: Trachea normal and phonation normal. Neck supple. No edema present. No erythema present. No neck rigidity present.   Cardiovascular: Normal rate, regular rhythm, normal heart sounds and normal pulses.   Pulmonary/Chest: Effort normal. No respiratory distress. She has no decreased breath sounds. She has wheezes. She has no rhonchi.   Abdominal: Normal appearance.   Musculoskeletal: Normal range of motion.         General: No deformity. Normal range of motion.   Neurological: She is alert and oriented to person, place, and time. She exhibits normal muscle tone. Coordination normal.   Skin: Skin is warm, dry, intact, not diaphoretic and not pale.   Psychiatric: Her speech is normal and behavior is normal. Judgment and thought content normal.   Nursing note and vitals reviewed.      Assessment:     1. Otalgia of left ear        Plan:       Otalgia of left ear  -     predniSONE (DELTASONE) 20 MG tablet; Take 1 tablet (20 mg total) by mouth once daily. for 5 days  Dispense: 5 tablet; Refill: 0        Patient's oxygen 92% in clinic, patient has history of COPD and wheezing at baseline.  Patient reports she is in no distress and this is her normal baseline.      Discussed as she has taken 7 days of oral antibiotics she may discontinue Augmentin and not complete the full 10 day course at this time.  As there is no true AOM on exam.  Discussed treatment with antihistamines as well as steroids.  Patient is diabetic however last A1c less than 6.  Patient reports she takes metformin daily however she did get a chest CT scan with contrast and is unable to take metformin until 2 days from now.  Patient requesting steroid.    Discussed holding off on oral  prednisone and still she is able to begin her metformin again.  Patient verbalized understanding.  Discussed following with ENT if symptoms fail to improve despite steroid therapy and antihistamines.  Patient is to call back for referral if needed.    Also discussed returning to clinic if diarrhea persists despite discontinuation of Augmentin for stool studies.  Patient reports after longer than 3-5 days use of Solu-Medrol she develops itching.  However she is able to take oral prednisone.    Discontinue the Augmentin antibiotic.  Begin oral steroids after you resume your diabetes medication metformin.  When she taking oral prednisone monitor blood sugars and monitor your diet, Ensure your eating low sugar and low carbs.  Taking steroids with food.  For and return if diarrhea persists for greater than 5 days for stool studies.  Drink plenty of fluids. Get plenty of rest.   Over-the-counter antihistamine such as Claritin or Zyrtec to assist with drainage of any fluid from behind the ear.    Tylenol as needed for pain.    Go to the ER with any significant change or worsening of symptoms.   Follow up with your primary care doctor.    Smoking cessation strongly encouraged.  Assistance offered, information will be provided.  If not ready to quit now, patient will contact this clinic in the future if I can be of any specific health related to the discontinuation of smoking/tobacco use.

## 2023-09-06 NOTE — PATIENT INSTRUCTIONS
Discontinue the Augmentin antibiotic.    Begin oral steroids after you resume your diabetes medication metformin.  When she taking oral prednisone monitor blood sugars and monitor your diet, Ensure your eating low sugar and low carbs.  Taking steroids with food.  For and return if diarrhea persists for greater than 5 days for stool studies.  Drink plenty of fluids. Get plenty of rest.   Over-the-counter antihistamine such as Claritin or Zyrtec to assist with drainage of any fluid from behind the ear.    Tylenol as needed for pain.    Go to the ER with any significant change or worsening of symptoms.   Follow up with your primary care doctor.      Smoking cessation strongly encouraged.  Assistance offered, information will be provided.  If not ready to quit now, patient will contact this clinic in the future if I can be of any specific health related to the discontinuation of smoking/tobacco use.

## 2023-09-06 NOTE — PROGRESS NOTES
CHIEF COMPLAINT:   Chief Complaint   Patient presents with    Follow-up     6 mos f/u needs refills on all meds denies cardiac targets                                                  HPI:  Fadumo Lynch 73 y.o. female female with a PMH significant for HFimpEF, HTN, HLD, SVT, NIDDM, COPD, and tobacco use who presents to cardiology clinic for follow up and ongoing care.  Patient completed an Echocardiogram on 10.7.22 which revealed an EF of 55%.  See report below.  She completed an Exercise Nuclear Stress Test on Florina 15, 2021 which revealed no ischemia and no scar.  At last appointment patient stated that she was doing very well.  She stated that she was taking Toprol 75mg every day and that it had resolved her SVT.  She also endorsed baseline FRASER., otherwise denied any cardiac complaints.    Today the patient reports that she is feeling fairly well overall.  She reports ongoing baseline FRASER/SOB that has not progressed.  She denies any cardiac complaints of chest pain, palpitations, PND, orthopnea, claudication symptoms, lightheadedness, dizziness, or syncope.  She states that she is compliant with her Toprol 75 mg daily and has not had any SVT episodes recently.  She states that she is able to complete her ADLs without any issues or ischemic symptoms, her FRASER/SOB from her COPD is her main limiting factor.  That she is compliant with all her medications.  She continues to smoke 1/2 PPD and is on her own.                                                                                                                                                                                                                                                                                                                                                                                                                                                                                       CARDIAC TESTING:  Results for orders placed  during the hospital encounter of 10/07/22    Echo    Interpretation Summary  · Normal systolic function.  · The estimated ejection fraction is 55%.  · Normal right ventricular size with normal right ventricular systolic function.  · Normal central venous pressure (3 mmHg).  · The estimated PA systolic pressure is 12 mmHg.  · IVC is normal.    No results found for this or any previous visit.     No results found for this or any previous visit.       Patient Active Problem List   Diagnosis    Chronic obstructive pulmonary disease    Chronic kidney disease    Hyperlipidemia    Hypertension    Supraventricular tachycardia    Type 2 diabetes mellitus    Tobacco user    Tobacco use    Shortness of breath    Heart failure with reduced ejection fraction    Arthritis    Impaired glucose tolerance    Lesion of nose    Osteoporosis    Thyroid nodule     Past Surgical History:   Procedure Laterality Date    TONSILLECTOMY AND ADENOIDECTOMY  1964    VAGINAL HYSTERECTOMY  1986     Social History     Socioeconomic History    Marital status:     Number of children: 2   Occupational History    Occupation: Retired   Tobacco Use    Smoking status: Every Day     Current packs/day: 0.50     Average packs/day: 0.5 packs/day for 50.0 years (25.0 ttl pk-yrs)     Types: Cigarettes    Smokeless tobacco: Never   Substance and Sexual Activity    Alcohol use: Never    Drug use: Never        Family History   Problem Relation Age of Onset    Colon cancer Mother     Cancer Father      Review of patient's allergies indicates:   Allergen Reactions    Medrol [methylprednisolone] Itching    Naproxen Hives    Zinc sulfate      Other reaction(s): LATE EFFECT OF BURNS OF OTHER SPECIFIED SITES         ROS:  Review of Systems   Constitutional: Negative.    HENT: Negative.     Eyes: Negative.    Respiratory:  Positive for cough, shortness of breath and wheezing.    Cardiovascular: Negative.  Negative for chest pain, palpitations, orthopnea,  "claudication, leg swelling and PND.   Gastrointestinal: Negative.    Genitourinary: Negative.    Musculoskeletal: Negative.    Skin: Negative.    Neurological: Negative.    Endo/Heme/Allergies: Negative.    Psychiatric/Behavioral: Negative.                                                                                                                                                                                  Negative except as stated in the history of present illness. See HPI for details.    PHYSICAL EXAM:  Visit Vitals  /70 (BP Location: Right arm, Patient Position: Sitting, BP Method: Medium (Automatic))   Pulse 77   Temp 98.8 °F (37.1 °C)   Resp 18   Ht 5' 2" (1.575 m)   Wt 69.8 kg (153 lb 14.1 oz)   SpO2 95%   BMI 28.15 kg/m²       Physical Exam  Constitutional:       Appearance: Normal appearance.   HENT:      Head: Normocephalic.      Nose: Nose normal.      Mouth/Throat:      Mouth: Mucous membranes are moist.   Eyes:      Extraocular Movements: Extraocular movements intact.   Neck:      Vascular: No carotid bruit.   Cardiovascular:      Rate and Rhythm: Normal rate and regular rhythm.      Pulses: Normal pulses.      Heart sounds: Normal heart sounds. No murmur heard.  Pulmonary:      Effort: Pulmonary effort is normal. No respiratory distress.      Breath sounds: Wheezing present.   Abdominal:      General: There is no distension.   Musculoskeletal:         General: Normal range of motion.      Cervical back: Normal range of motion.      Right lower leg: No edema.      Left lower leg: No edema.   Skin:     General: Skin is warm.   Neurological:      General: No focal deficit present.      Mental Status: She is alert.   Psychiatric:         Mood and Affect: Mood normal.         Current Outpatient Medications   Medication Instructions    albuterol (PROVENTIL) 2.5 mg, Nebulization, 4 times daily PRN    albuterol (PROVENTIL/VENTOLIN HFA) 90 mcg/actuation inhaler 1-2 puffs, Inhalation, Every 4 hours " PRN    aspirin 81 mg, Oral, Daily    blood sugar diagnostic Strp To check BG 2 times daily, to use with insurance preferred meter    blood-glucose meter kit To check BG 2 times daily, to use with insurance preferred meter    diclofenac sodium (VOLTAREN) 2 g, Topical (Top), 4 times daily PRN    lancets Misc To check BG 2 times daily, to use with insurance preferred meter    metFORMIN (GLUCOPHAGE) 500 mg, Oral, Daily    metoprolol succinate (TOPROL-XL) 25 mg, Oral, 2 times daily    metoprolol succinate (TOPROL-XL) 50 mg, Oral, 2 times daily, ALONG WITH A 25MG TAB =75MG BID    nitroGLYCERIN (NITROSTAT) 0.4 mg, Sublingual, Every 5 min PRN    ONETOUCH ULTRA2 METER Misc SMARTSI Via Meter Daily    pravastatin (PRAVACHOL) 80 mg, Oral, Nightly    predniSONE (DELTASONE) 20 mg, Oral, Daily    TRELEGY ELLIPTA 100-62.5-25 mcg DsDv 1 puff, Inhalation, Daily    triamcinolone acetonide 0.1% (KENALOG) 0.1 % cream Topical (Top), 2 times daily, Use as directed    vitamin D (VITAMIN D3) 2,000 Units, Oral, Daily        All medications, laboratory studies, cardiac diagnostic imaging reviewed.     Lab Results   Component Value Date    LDL 67.00 2023    LDL 81.00 10/04/2022    TRIG 149 (H) 2023    TRIG 151 (H) 10/04/2022    CREATININE 0.82 2023    MG 2.10 2022    K 4.4 2023        ASSESSMENT/PLAN:  73 y.o. female with the following medical problems:     Heart Failure with Reduced Ejection Fraction   - Reports baseline SOB/FRASER that she associates with COPD, denies any other cardiac complaints (see HPI)  - HF Status: NYHA Class II, ACC/AHA Stage C  - LVEF: 55% (TTE on 10/7/2022) - previously 40% on 9/15/2020  - Current GDMT:  Metoprolol Succinate 75mg  - Given hypotension, Lisinopril was stopped at last office visit  - Patient was started on Jardiance at that time, but has since discontinued due to hypoglycemia  - Continue Low Na Diet   - Strict Is/Os and daily weights    Chest Pain- resolved  - Denies any  recent episodes of chest pain, denies any recent SL Nitro use  - Exercise Nuclear Stress Test 6.15.21: No ischemia   - Continue Aspirin 81mg daily and SL Nitro prn CP    History of Hypertension  - BP at goal - 131/70  - Continue Toprol XL   - Lisinopril starts at previous office visit due to hypotension  - Counseled on low salt diet and exercise as tolerated    SVT (supraventricular tachycardia)   - Continue Toprol XL 75mg daily  - Denies any recent SVT episodes or any palpitations    DM2 (type 2 diabetes mellitus)  - Management per PCP    Hyperlipidemia   - LDL 67 - at goal   - Continue Pravastatin 80mg daily  - Counseled on low-cholesterol, heart healthy diet and exercise as tolerated    COPD  - Management per PCP  - Strongly encouraged smoking cessation    Tobacco Use  - Continues to smoke 1/2 PPD  - Counseled on smoking cessation        Follow up in cardiology clinic in 6 months or sooner if needed   Follow up with PCP as directed

## 2023-09-07 ENCOUNTER — OFFICE VISIT (OUTPATIENT)
Dept: CARDIOLOGY | Facility: CLINIC | Age: 73
End: 2023-09-07
Payer: MEDICARE

## 2023-09-07 VITALS
HEIGHT: 62 IN | OXYGEN SATURATION: 95 % | SYSTOLIC BLOOD PRESSURE: 131 MMHG | TEMPERATURE: 99 F | RESPIRATION RATE: 18 BRPM | DIASTOLIC BLOOD PRESSURE: 70 MMHG | BODY MASS INDEX: 28.32 KG/M2 | WEIGHT: 153.88 LBS | HEART RATE: 77 BPM

## 2023-09-07 DIAGNOSIS — I47.10 SUPRAVENTRICULAR TACHYCARDIA: ICD-10-CM

## 2023-09-07 DIAGNOSIS — Z72.0 TOBACCO USER: ICD-10-CM

## 2023-09-07 DIAGNOSIS — I10 PRIMARY HYPERTENSION: ICD-10-CM

## 2023-09-07 DIAGNOSIS — I50.20 HEART FAILURE WITH REDUCED EJECTION FRACTION: ICD-10-CM

## 2023-09-07 DIAGNOSIS — E78.2 MIXED HYPERLIPIDEMIA: Primary | ICD-10-CM

## 2023-09-07 PROCEDURE — 99215 OFFICE O/P EST HI 40 MIN: CPT | Mod: PBBFAC

## 2023-09-07 NOTE — PATIENT INSTRUCTIONS
Call clinic for refills if needed  Follow up in cardiology clinic in 6 months or sooner if needed   Follow up with PCP as directed

## 2023-09-18 ENCOUNTER — OFFICE VISIT (OUTPATIENT)
Dept: PULMONOLOGY | Facility: CLINIC | Age: 73
End: 2023-09-18
Payer: MEDICARE

## 2023-09-18 VITALS
RESPIRATION RATE: 14 BRPM | TEMPERATURE: 98 F | BODY MASS INDEX: 28.46 KG/M2 | WEIGHT: 154.63 LBS | OXYGEN SATURATION: 96 % | SYSTOLIC BLOOD PRESSURE: 103 MMHG | HEIGHT: 62 IN | DIASTOLIC BLOOD PRESSURE: 70 MMHG | HEART RATE: 63 BPM

## 2023-09-18 DIAGNOSIS — Z72.0 TOBACCO ABUSE: Primary | ICD-10-CM

## 2023-09-18 DIAGNOSIS — R91.8 MULTIPLE LUNG NODULES: ICD-10-CM

## 2023-09-18 DIAGNOSIS — Z71.6 TOBACCO ABUSE COUNSELING: ICD-10-CM

## 2023-09-18 PROCEDURE — 3008F PR BODY MASS INDEX (BMI) DOCUMENTED: ICD-10-PCS | Mod: CPTII,,, | Performed by: INTERNAL MEDICINE

## 2023-09-18 PROCEDURE — 99214 OFFICE O/P EST MOD 30 MIN: CPT | Mod: PBBFAC

## 2023-09-18 PROCEDURE — 1159F PR MEDICATION LIST DOCUMENTED IN MEDICAL RECORD: ICD-10-PCS | Mod: CPTII,,, | Performed by: INTERNAL MEDICINE

## 2023-09-18 PROCEDURE — 4010F PR ACE/ARB THEARPY RXD/TAKEN: ICD-10-PCS | Mod: CPTII,,, | Performed by: INTERNAL MEDICINE

## 2023-09-18 PROCEDURE — 3044F HG A1C LEVEL LT 7.0%: CPT | Mod: CPTII,,, | Performed by: INTERNAL MEDICINE

## 2023-09-18 PROCEDURE — 4010F ACE/ARB THERAPY RXD/TAKEN: CPT | Mod: CPTII,,, | Performed by: INTERNAL MEDICINE

## 2023-09-18 PROCEDURE — 3074F SYST BP LT 130 MM HG: CPT | Mod: CPTII,,, | Performed by: INTERNAL MEDICINE

## 2023-09-18 PROCEDURE — 3078F PR MOST RECENT DIASTOLIC BLOOD PRESSURE < 80 MM HG: ICD-10-PCS | Mod: CPTII,,, | Performed by: INTERNAL MEDICINE

## 2023-09-18 PROCEDURE — 3008F BODY MASS INDEX DOCD: CPT | Mod: CPTII,,, | Performed by: INTERNAL MEDICINE

## 2023-09-18 PROCEDURE — 3074F PR MOST RECENT SYSTOLIC BLOOD PRESSURE < 130 MM HG: ICD-10-PCS | Mod: CPTII,,, | Performed by: INTERNAL MEDICINE

## 2023-09-18 PROCEDURE — 99214 OFFICE O/P EST MOD 30 MIN: CPT | Mod: S$PBB,,, | Performed by: INTERNAL MEDICINE

## 2023-09-18 PROCEDURE — 1159F MED LIST DOCD IN RCRD: CPT | Mod: CPTII,,, | Performed by: INTERNAL MEDICINE

## 2023-09-18 PROCEDURE — 3044F PR MOST RECENT HEMOGLOBIN A1C LEVEL <7.0%: ICD-10-PCS | Mod: CPTII,,, | Performed by: INTERNAL MEDICINE

## 2023-09-18 PROCEDURE — 3078F DIAST BP <80 MM HG: CPT | Mod: CPTII,,, | Performed by: INTERNAL MEDICINE

## 2023-09-18 PROCEDURE — 99214 PR OFFICE/OUTPT VISIT, EST, LEVL IV, 30-39 MIN: ICD-10-PCS | Mod: S$PBB,,, | Performed by: INTERNAL MEDICINE

## 2023-09-18 RX ORDER — LANCETS 33 GAUGE
EACH MISCELLANEOUS
COMMUNITY
Start: 2023-05-06

## 2023-09-18 NOTE — PROGRESS NOTES
Subjective:     Chief Complaint: Yearly follow up for pulmolary nodule-had CT 9/5/2023; no c      HPI: Fadumo Lynch is a 73 y.o. female who presents for follow-up of pulmonary nodules.      She initially had a medial subpleural right middle lobe nodular opacity detected on 2017 CT chest, with follow-up imaging 2022 demonstrating some very slight enlargement.  PET/CT at that time demonstrated no significant avidity, max SUV 1.9.  Repeat CT chest 09/2023 demonstrates largely stable lesions, with further details below.      Patient denies any fevers, chills, cough or significant exercise limiting shortness of breath.  States she has occasional wheezing.    She continues to smoke cigarettes, and has not actively considered quitting at this time.  She has rescue albuterol nebulizers which she uses approximately twice daily, states she uses rescue albuterol MDI proximally once weekly.  She uses no long acting maintenance inhalers.        Past Medical History:   Diagnosis Date    CKD (chronic kidney disease)     COPD (chronic obstructive pulmonary disease)     Diabetes mellitus     Hyperlipidemia     Hypertension     Supraventricular tachycardia        14 point ROS reviewed and negative unless documented in the history of present illness.       Social History     Socioeconomic History    Marital status:     Number of children: 2   Occupational History    Occupation: Retired   Tobacco Use    Smoking status: Every Day     Current packs/day: 0.50     Average packs/day: 0.5 packs/day for 50.0 years (25.0 ttl pk-yrs)     Types: Cigarettes    Smokeless tobacco: Never   Substance and Sexual Activity    Alcohol use: Never    Drug use: Never     Current Outpatient Medications   Medication Instructions    albuterol (PROVENTIL) 2.5 mg, Nebulization, 4 times daily PRN    albuterol (PROVENTIL/VENTOLIN HFA) 90 mcg/actuation inhaler 1-2 puffs, Inhalation, Every 4 hours PRN    aspirin 81 mg, Oral, Daily    blood sugar  "diagnostic Strp To check BG 2 times daily, to use with insurance preferred meter    blood-glucose meter kit To check BG 2 times daily, to use with insurance preferred meter    diclofenac sodium (VOLTAREN) 2 g, Topical (Top), 4 times daily PRN    lancets Misc To check BG 2 times daily, to use with insurance preferred meter    metFORMIN (GLUCOPHAGE) 500 mg, Oral, Daily    metoprolol succinate (TOPROL-XL) 25 mg, Oral, 2 times daily    metoprolol succinate (TOPROL-XL) 50 mg, Oral, 2 times daily, ALONG WITH A 25MG TAB =75MG BID    nitroGLYCERIN (NITROSTAT) 0.4 mg, Sublingual, Every 5 min PRN    ONETOUCH DELICA PLUS LANCET 33 gauge Misc Topical (Top)    ONETOUCH ULTRA2 METER Misc SMARTSI Via Meter Daily    pravastatin (PRAVACHOL) 80 mg, Oral, Nightly    TRELEGY ELLIPTA 100-62.5-25 mcg DsDv 1 puff, Inhalation, Daily    triamcinolone acetonide 0.1% (KENALOG) 0.1 % cream Topical (Top), 2 times daily, Use as directed    vitamin D (VITAMIN D3) 2,000 Units, Oral, Daily       Objective:   /70 (BP Location: Left arm, Patient Position: Sitting)   Pulse 63   Temp 98 °F (36.7 °C) (Oral)   Resp 14   Ht 5' 2" (1.575 m)   Wt 70.1 kg (154 lb 9.6 oz)   SpO2 96%   BMI 28.28 kg/m²       Gen- A/O, NAD  CV- RRR  Resp- CTAB  MSK- WWP, no LE edema  Neuro- A/Ox3, no gross deficits  Psych- appropriate mood and affect        Imaging:  CT chest 2023 personally reviewed:  Right middle lobe medial subpleural nodular opacity measuring approximately 16 mm in largest diameter (series 3, image 76), stable from 2022 imaging; 5 mm right lower lobe ground-glass nodule (3/85), stable from prior; 3 mm right middle lobe nodule (3/61), stable from prior        Assessment/Plan:   Ms. Lynch is a 74yo female who presents to clinic for follow-up of multiple pulmonary nodules.      As documented above, her right middle lobe medial subpleural nodular opacity has been largely stable with only slight interval enlargement since its initial " detection in 2017.  Prior PET/CT scan 09/2022 demonstrated no significant PET avidity of the lesion, extremely reassuring that it is not an underlying malignancy.  Should this lesion show interval growth or suspicious behavior, it will be extremely difficult to biopsy given its location.  Patient warrants ongoing yearly lung cancer screening CTs given ongoing tobacco abuse.    Strongly encourage complete tobacco cessation, patient currently in the pre contemplative phase.        Return to clinic in 1 year with CT chest.        Sanket Castro MD

## 2023-09-20 DIAGNOSIS — Z72.0 TOBACCO ABUSE: ICD-10-CM

## 2023-09-20 DIAGNOSIS — R91.1 SOLITARY PULMONARY NODULE: ICD-10-CM

## 2023-09-20 DIAGNOSIS — R91.8 LUNG NODULES: Primary | ICD-10-CM

## 2023-10-02 ENCOUNTER — LAB VISIT (OUTPATIENT)
Dept: LAB | Facility: HOSPITAL | Age: 73
End: 2023-10-02
Attending: NURSE PRACTITIONER
Payer: MEDICARE

## 2023-10-02 DIAGNOSIS — E11.9 TYPE 2 DIABETES MELLITUS WITHOUT COMPLICATION, WITHOUT LONG-TERM CURRENT USE OF INSULIN: ICD-10-CM

## 2023-10-02 DIAGNOSIS — E55.9 VITAMIN D DEFICIENCY: ICD-10-CM

## 2023-10-02 DIAGNOSIS — I10 HYPERTENSION, UNSPECIFIED TYPE: ICD-10-CM

## 2023-10-02 LAB
ALBUMIN SERPL-MCNC: 3.4 G/DL (ref 3.4–4.8)
ALBUMIN/GLOB SERPL: 0.8 RATIO (ref 1.1–2)
ALP SERPL-CCNC: 117 UNIT/L (ref 40–150)
ALT SERPL-CCNC: 17 UNIT/L (ref 0–55)
AST SERPL-CCNC: 17 UNIT/L (ref 5–34)
BASOPHILS # BLD AUTO: 0.06 X10(3)/MCL
BASOPHILS NFR BLD AUTO: 0.6 %
BILIRUB SERPL-MCNC: 0.5 MG/DL
BUN SERPL-MCNC: 9.5 MG/DL (ref 9.8–20.1)
CALCIUM SERPL-MCNC: 9.4 MG/DL (ref 8.4–10.2)
CHLORIDE SERPL-SCNC: 100 MMOL/L (ref 98–107)
CHOLEST SERPL-MCNC: 137 MG/DL
CHOLEST/HDLC SERPL: 4 {RATIO} (ref 0–5)
CO2 SERPL-SCNC: 31 MMOL/L (ref 23–31)
CREAT SERPL-MCNC: 0.85 MG/DL (ref 0.55–1.02)
CREAT UR-MCNC: 290.2 MG/DL (ref 45–106)
DEPRECATED CALCIDIOL+CALCIFEROL SERPL-MC: 33.4 NG/ML (ref 30–80)
EOSINOPHIL # BLD AUTO: 0.62 X10(3)/MCL (ref 0–0.9)
EOSINOPHIL NFR BLD AUTO: 6.1 %
ERYTHROCYTE [DISTWIDTH] IN BLOOD BY AUTOMATED COUNT: 13 % (ref 11.5–17)
EST. AVERAGE GLUCOSE BLD GHB EST-MCNC: 119.8 MG/DL
GFR SERPLBLD CREATININE-BSD FMLA CKD-EPI: >60 MLS/MIN/1.73/M2
GLOBULIN SER-MCNC: 4.3 GM/DL (ref 2.4–3.5)
GLUCOSE SERPL-MCNC: 108 MG/DL (ref 82–115)
HBA1C MFR BLD: 5.8 %
HCT VFR BLD AUTO: 44.7 % (ref 37–47)
HDLC SERPL-MCNC: 37 MG/DL (ref 35–60)
HGB BLD-MCNC: 14.4 G/DL (ref 12–16)
IMM GRANULOCYTES # BLD AUTO: 0.03 X10(3)/MCL (ref 0–0.04)
IMM GRANULOCYTES NFR BLD AUTO: 0.3 %
LDLC SERPL CALC-MCNC: 64 MG/DL (ref 50–140)
LYMPHOCYTES # BLD AUTO: 3.44 X10(3)/MCL (ref 0.6–4.6)
LYMPHOCYTES NFR BLD AUTO: 33.8 %
MCH RBC QN AUTO: 29.8 PG (ref 27–31)
MCHC RBC AUTO-ENTMCNC: 32.2 G/DL (ref 33–36)
MCV RBC AUTO: 92.5 FL (ref 80–94)
MICROALBUMIN UR-MCNC: 10.3 UG/ML
MICROALBUMIN/CREAT RATIO PNL UR: 3.5 MG/GM CR (ref 0–30)
MONOCYTES # BLD AUTO: 0.66 X10(3)/MCL (ref 0.1–1.3)
MONOCYTES NFR BLD AUTO: 6.5 %
NEUTROPHILS # BLD AUTO: 5.37 X10(3)/MCL (ref 2.1–9.2)
NEUTROPHILS NFR BLD AUTO: 52.7 %
NRBC BLD AUTO-RTO: 0 %
PLATELET # BLD AUTO: 336 X10(3)/MCL (ref 130–400)
PMV BLD AUTO: 9.1 FL (ref 7.4–10.4)
POTASSIUM SERPL-SCNC: 4.5 MMOL/L (ref 3.5–5.1)
PROT SERPL-MCNC: 7.7 GM/DL (ref 5.8–7.6)
RBC # BLD AUTO: 4.83 X10(6)/MCL (ref 4.2–5.4)
SODIUM SERPL-SCNC: 138 MMOL/L (ref 136–145)
TRIGL SERPL-MCNC: 179 MG/DL (ref 37–140)
VLDLC SERPL CALC-MCNC: 36 MG/DL
WBC # SPEC AUTO: 10.18 X10(3)/MCL (ref 4.5–11.5)

## 2023-10-02 PROCEDURE — 83036 HEMOGLOBIN GLYCOSYLATED A1C: CPT

## 2023-10-02 PROCEDURE — 80061 LIPID PANEL: CPT

## 2023-10-02 PROCEDURE — 82306 VITAMIN D 25 HYDROXY: CPT

## 2023-10-02 PROCEDURE — 85025 COMPLETE CBC W/AUTO DIFF WBC: CPT

## 2023-10-02 PROCEDURE — 82043 UR ALBUMIN QUANTITATIVE: CPT

## 2023-10-02 PROCEDURE — 36415 COLL VENOUS BLD VENIPUNCTURE: CPT

## 2023-10-02 PROCEDURE — 80053 COMPREHEN METABOLIC PANEL: CPT

## 2023-10-10 ENCOUNTER — OFFICE VISIT (OUTPATIENT)
Dept: INTERNAL MEDICINE | Facility: CLINIC | Age: 73
End: 2023-10-10
Payer: MEDICARE

## 2023-10-10 VITALS
WEIGHT: 153 LBS | BODY MASS INDEX: 28.16 KG/M2 | SYSTOLIC BLOOD PRESSURE: 112 MMHG | TEMPERATURE: 98 F | HEART RATE: 72 BPM | RESPIRATION RATE: 18 BRPM | HEIGHT: 62 IN | OXYGEN SATURATION: 92 % | DIASTOLIC BLOOD PRESSURE: 60 MMHG

## 2023-10-10 DIAGNOSIS — J44.9 CHRONIC OBSTRUCTIVE PULMONARY DISEASE, UNSPECIFIED COPD TYPE: ICD-10-CM

## 2023-10-10 DIAGNOSIS — E55.9 VITAMIN D DEFICIENCY: ICD-10-CM

## 2023-10-10 DIAGNOSIS — E11.9 TYPE 2 DIABETES MELLITUS WITHOUT COMPLICATION, WITHOUT LONG-TERM CURRENT USE OF INSULIN: ICD-10-CM

## 2023-10-10 DIAGNOSIS — E78.5 HYPERLIPIDEMIA, UNSPECIFIED HYPERLIPIDEMIA TYPE: ICD-10-CM

## 2023-10-10 DIAGNOSIS — I10 HYPERTENSION, UNSPECIFIED TYPE: ICD-10-CM

## 2023-10-10 DIAGNOSIS — Z12.31 BREAST CANCER SCREENING BY MAMMOGRAM: ICD-10-CM

## 2023-10-10 DIAGNOSIS — Z23 NEED FOR INFLUENZA VACCINATION: ICD-10-CM

## 2023-10-10 DIAGNOSIS — F17.210 CIGARETTE NICOTINE DEPENDENCE WITHOUT COMPLICATION: ICD-10-CM

## 2023-10-10 PROCEDURE — 99214 PR OFFICE/OUTPT VISIT, EST, LEVL IV, 30-39 MIN: ICD-10-PCS | Mod: S$PBB,,, | Performed by: NURSE PRACTITIONER

## 2023-10-10 PROCEDURE — 3078F DIAST BP <80 MM HG: CPT | Mod: CPTII,,, | Performed by: NURSE PRACTITIONER

## 2023-10-10 PROCEDURE — 1101F PR PT FALLS ASSESS DOC 0-1 FALLS W/OUT INJ PAST YR: ICD-10-PCS | Mod: CPTII,,, | Performed by: NURSE PRACTITIONER

## 2023-10-10 PROCEDURE — 3288F PR FALLS RISK ASSESSMENT DOCUMENTED: ICD-10-PCS | Mod: CPTII,,, | Performed by: NURSE PRACTITIONER

## 2023-10-10 PROCEDURE — 99214 OFFICE O/P EST MOD 30 MIN: CPT | Mod: S$PBB,,, | Performed by: NURSE PRACTITIONER

## 2023-10-10 PROCEDURE — 3074F PR MOST RECENT SYSTOLIC BLOOD PRESSURE < 130 MM HG: ICD-10-PCS | Mod: CPTII,,, | Performed by: NURSE PRACTITIONER

## 2023-10-10 PROCEDURE — 3066F PR DOCUMENTATION OF TREATMENT FOR NEPHROPATHY: ICD-10-PCS | Mod: CPTII,,, | Performed by: NURSE PRACTITIONER

## 2023-10-10 PROCEDURE — 4010F PR ACE/ARB THEARPY RXD/TAKEN: ICD-10-PCS | Mod: CPTII,,, | Performed by: NURSE PRACTITIONER

## 2023-10-10 PROCEDURE — 1126F PR PAIN SEVERITY QUANTIFIED, NO PAIN PRESENT: ICD-10-PCS | Mod: CPTII,,, | Performed by: NURSE PRACTITIONER

## 2023-10-10 PROCEDURE — 3008F PR BODY MASS INDEX (BMI) DOCUMENTED: ICD-10-PCS | Mod: CPTII,,, | Performed by: NURSE PRACTITIONER

## 2023-10-10 PROCEDURE — 1160F RVW MEDS BY RX/DR IN RCRD: CPT | Mod: CPTII,,, | Performed by: NURSE PRACTITIONER

## 2023-10-10 PROCEDURE — 3078F PR MOST RECENT DIASTOLIC BLOOD PRESSURE < 80 MM HG: ICD-10-PCS | Mod: CPTII,,, | Performed by: NURSE PRACTITIONER

## 2023-10-10 PROCEDURE — 3008F BODY MASS INDEX DOCD: CPT | Mod: CPTII,,, | Performed by: NURSE PRACTITIONER

## 2023-10-10 PROCEDURE — 1160F PR REVIEW ALL MEDS BY PRESCRIBER/CLIN PHARMACIST DOCUMENTED: ICD-10-PCS | Mod: CPTII,,, | Performed by: NURSE PRACTITIONER

## 2023-10-10 PROCEDURE — 3288F FALL RISK ASSESSMENT DOCD: CPT | Mod: CPTII,,, | Performed by: NURSE PRACTITIONER

## 2023-10-10 PROCEDURE — 1159F PR MEDICATION LIST DOCUMENTED IN MEDICAL RECORD: ICD-10-PCS | Mod: CPTII,,, | Performed by: NURSE PRACTITIONER

## 2023-10-10 PROCEDURE — 90694 VACC AIIV4 NO PRSRV 0.5ML IM: CPT | Mod: PBBFAC

## 2023-10-10 PROCEDURE — 4010F ACE/ARB THERAPY RXD/TAKEN: CPT | Mod: CPTII,,, | Performed by: NURSE PRACTITIONER

## 2023-10-10 PROCEDURE — 3061F PR NEG MICROALBUMINURIA RESULT DOCUMENTED/REVIEW: ICD-10-PCS | Mod: CPTII,,, | Performed by: NURSE PRACTITIONER

## 2023-10-10 PROCEDURE — 99215 OFFICE O/P EST HI 40 MIN: CPT | Mod: PBBFAC,25 | Performed by: NURSE PRACTITIONER

## 2023-10-10 PROCEDURE — 1159F MED LIST DOCD IN RCRD: CPT | Mod: CPTII,,, | Performed by: NURSE PRACTITIONER

## 2023-10-10 PROCEDURE — 99406 BEHAV CHNG SMOKING 3-10 MIN: CPT | Mod: S$PBB,,, | Performed by: NURSE PRACTITIONER

## 2023-10-10 PROCEDURE — 3044F PR MOST RECENT HEMOGLOBIN A1C LEVEL <7.0%: ICD-10-PCS | Mod: CPTII,,, | Performed by: NURSE PRACTITIONER

## 2023-10-10 PROCEDURE — G0008 ADMIN INFLUENZA VIRUS VAC: HCPCS | Mod: PBBFAC

## 2023-10-10 PROCEDURE — 3074F SYST BP LT 130 MM HG: CPT | Mod: CPTII,,, | Performed by: NURSE PRACTITIONER

## 2023-10-10 PROCEDURE — 1126F AMNT PAIN NOTED NONE PRSNT: CPT | Mod: CPTII,,, | Performed by: NURSE PRACTITIONER

## 2023-10-10 PROCEDURE — 3044F HG A1C LEVEL LT 7.0%: CPT | Mod: CPTII,,, | Performed by: NURSE PRACTITIONER

## 2023-10-10 PROCEDURE — 3061F NEG MICROALBUMINURIA REV: CPT | Mod: CPTII,,, | Performed by: NURSE PRACTITIONER

## 2023-10-10 PROCEDURE — 3066F NEPHROPATHY DOC TX: CPT | Mod: CPTII,,, | Performed by: NURSE PRACTITIONER

## 2023-10-10 PROCEDURE — 99406 PR TOBACCO USE CESSATION INTERMEDIATE 3-10 MINUTES: ICD-10-PCS | Mod: S$PBB,,, | Performed by: NURSE PRACTITIONER

## 2023-10-10 PROCEDURE — 1101F PT FALLS ASSESS-DOCD LE1/YR: CPT | Mod: CPTII,,, | Performed by: NURSE PRACTITIONER

## 2023-10-10 RX ORDER — ALBUTEROL SULFATE 0.83 MG/ML
2.5 SOLUTION RESPIRATORY (INHALATION) 4 TIMES DAILY PRN
Qty: 120 EACH | Refills: 6 | Status: SHIPPED | OUTPATIENT
Start: 2023-10-10

## 2023-10-10 RX ORDER — FLUTICASONE FUROATE, UMECLIDINIUM BROMIDE AND VILANTEROL TRIFENATATE 100; 62.5; 25 UG/1; UG/1; UG/1
1 POWDER RESPIRATORY (INHALATION) DAILY
Qty: 1 EACH | Refills: 6 | Status: SHIPPED | OUTPATIENT
Start: 2023-10-10

## 2023-10-10 RX ORDER — ALBUTEROL SULFATE 90 UG/1
1-2 AEROSOL, METERED RESPIRATORY (INHALATION) EVERY 4 HOURS PRN
Qty: 18 G | Refills: 4 | Status: SHIPPED | OUTPATIENT
Start: 2023-10-10

## 2023-10-10 RX ORDER — METFORMIN HYDROCHLORIDE 500 MG/1
500 TABLET ORAL DAILY
Qty: 90 TABLET | Refills: 1 | Status: SHIPPED | OUTPATIENT
Start: 2023-10-10

## 2023-10-10 RX ADMIN — INFLUENZA A VIRUS A/VICTORIA/4897/2022 IVR-238 (H1N1) ANTIGEN (FORMALDEHYDE INACTIVATED), INFLUENZA A VIRUS A/DARWIN/6/2021 IVR-227 (H3N2) ANTIGEN (FORMALDEHYDE INACTIVATED), INFLUENZA B VIRUS B/AUSTRIA/1359417/2021 BVR-26 ANTIGEN (FORMALDEHYDE INACTIVATED), INFLUENZA B VIRUS B/PHUKET/3073/2013 BVR-1B ANTIGEN (FORMALDEHYDE INACTIVATED) 0.5 ML: 15; 15; 15; 15 INJECTION, SUSPENSION INTRAMUSCULAR at 08:10

## 2023-10-10 NOTE — PROGRESS NOTES
[facility]  ANGELITO Soto     Patient Name: Fadumo Lynch   : 1950  MRN:[id number]     Chief Complaint     Chief Complaint   Patient presents with    Follow-up     Lab results        History of Present Illness     73 year old white female, presents in clinic for lab results.   PMH SVT, HLD, HTN, CKD, COPD, T2DM, Osteopenia, multinodular thyroid, basal cell carcinoma, tobacco user. Pt states she is not ready to quit smoking; 10 cig/d. Pt is followed by Renal clinic for stage 3 CKD, cardiology clinic for SVT and medications, and ENT for basal cell carcinoma (nasal lesion) and thyroid nodules. Reports significant improvement in her palpitations with the metoprolol succinate 75 mg twice daily dose as per Cardio. Pulmonary clinic is following for pulmonary nodule. Denies chest pain, shortness of breath, cough, fever, headache, dizziness, weakness, abdominal pain, nausea, vomiting, diarrhea, constipation, dysuria, depression, anxiety.     Thyroid US FINDINGS:2022  Examination reveals the right hemithyroid to measure 4.4 cm x 1.9 x 1.6 cm, left earnest thyroid measures 3.7 x 1.7 x 1.5 cm isthmus measures 4.3 mm in thickness  On the right multiple anechoic/hypoechoic nodules are identified within the right lobe of the thyroid the largest measuring 1.2 x 1.4 x 0.6 cm mesh least stable to minimally enlarged as compared with the previous exam other subcentimeter nodules are identified largest measuring 6 mm x 5 mm x 3 mm.  On the left multiple subcentimeter nodules are identified a larger subcentimeter nodule measures 1.1 by 1 x 8 mm corresponding to a TI-RADS type 4 nodule follow-up is recommended if greater than 1 cm at 1 year, 2 years, 3 years and 5 years.  No other abnormalities  Impression:  Nodules as above with recommendations as above.  The subcentimeter nodules do not meet criteria for biopsy or surveillance.  No significant change since 2019        Mammogram 2022; BIRADS  "1  DEXA 12/13/2022; Normal bone mineral density of the lumbar vertebrae. Improved osteopenia of the femurs.        Exercise nuclear stress test on Florina 15, 2021 which revealed no ischemia and no scar.   ECHO 9/2020; EF 40 %  Cologuard neg 10/19/2022  Following ENT; LOV 3/22/2021  Diabetic fundus; 1/2022 No retinopathy          Review of Systems     Review of Systems   Constitutional: Negative.    HENT: Negative.     Eyes: Negative.    Respiratory: Negative.     Cardiovascular: Negative.    Gastrointestinal: Negative.    Endocrine: Negative.    Genitourinary: Negative.    Musculoskeletal: Negative.    Integumentary:  Negative.   Allergic/Immunologic: Negative.    Neurological: Negative.    Hematological: Negative.    Psychiatric/Behavioral: Negative.     All other systems reviewed and are negative.       Physical Examination     Visit Vitals  /60 (BP Location: Left arm, Patient Position: Sitting, BP Method: Medium (Automatic))   Pulse 72   Temp 98.2 °F (36.8 °C) (Oral)   Resp 18   Ht 5' 2" (1.575 m)   Wt 69.4 kg (153 lb)   SpO2 (!) 92%   BMI 27.98 kg/m²        BP Readings from Last 6 Encounters:   10/10/23 112/60   09/18/23 103/70   09/07/23 131/70   09/06/23 109/67   08/31/23 (!) 146/66   05/25/23 (!) 144/73   ]    Wt Readings from Last 6 Encounters:   10/10/23 69.4 kg (153 lb)   09/18/23 70.1 kg (154 lb 9.6 oz)   09/07/23 69.8 kg (153 lb 14.1 oz)   09/06/23 71.7 kg (158 lb)   08/31/23 71.7 kg (158 lb 1.6 oz)   05/25/23 71.7 kg (158 lb 1.1 oz)   ]      Physical Exam  Vitals and nursing note reviewed.   Constitutional:       Appearance: Normal appearance.   HENT:      Head: Normocephalic and atraumatic.      Right Ear: Tympanic membrane, ear canal and external ear normal.      Left Ear: Tympanic membrane, ear canal and external ear normal.      Nose: Nose normal.      Mouth/Throat:      Mouth: Mucous membranes are moist.      Pharynx: Oropharynx is clear.   Eyes:      Extraocular Movements: Extraocular " movements intact.      Conjunctiva/sclera: Conjunctivae normal.      Pupils: Pupils are equal, round, and reactive to light.   Cardiovascular:      Rate and Rhythm: Normal rate and regular rhythm.      Pulses: Normal pulses.      Heart sounds: Normal heart sounds.   Pulmonary:      Effort: Pulmonary effort is normal.      Breath sounds: Normal breath sounds.   Abdominal:      General: Abdomen is flat. Bowel sounds are normal.      Palpations: Abdomen is soft.   Musculoskeletal:         General: Normal range of motion.      Cervical back: Normal range of motion and neck supple.   Skin:     General: Skin is warm and dry.      Capillary Refill: Capillary refill takes less than 2 seconds.   Neurological:      General: No focal deficit present.      Mental Status: She is alert and oriented to person, place, and time. Mental status is at baseline.   Psychiatric:         Mood and Affect: Mood normal.         Behavior: Behavior normal.         Thought Content: Thought content normal.         Judgment: Judgment normal.          Labs / Imaging     Chemistry:  Lab Results   Component Value Date     10/02/2023    K 4.5 10/02/2023    CHLORIDE 100 10/02/2023    BUN 9.5 (L) 10/02/2023    CREATININE 0.85 10/02/2023    EGFRNORACEVR >60 10/02/2023    GLUCOSE 108 10/02/2023    CALCIUM 9.4 10/02/2023    ALKPHOS 117 10/02/2023    LABPROT 7.7 (H) 10/02/2023    ALBUMIN 3.4 10/02/2023    BILIDIR 0.2 08/31/2021    IBILI 0.20 08/31/2021    AST 17 10/02/2023    ALT 17 10/02/2023    MG 2.10 12/26/2022    PHOS 3.3 12/26/2022    OXKYVIVS61KA 33.4 10/02/2023        Lab Results   Component Value Date    HGBA1C 5.8 10/02/2023        Hematology:  Lab Results   Component Value Date    WBC 10.18 10/02/2023    RBC 4.83 10/02/2023    HGB 14.4 10/02/2023    HCT 44.7 10/02/2023    MCV 92.5 10/02/2023    MCH 29.8 10/02/2023    MCHC 32.2 (L) 10/02/2023    RDW 13.0 10/02/2023     10/02/2023    MPV 9.1 10/02/2023        Lipid Panel:  Lab Results    Component Value Date    CHOL 137 10/02/2023    HDL 37 10/02/2023    LDL 64.00 10/02/2023    TRIG 179 (H) 10/02/2023    TOTALCHOLEST 4 10/02/2023        Urine:  Lab Results   Component Value Date    COLORUA Light-Yellow 05/05/2023    APPEARANCEUA Clear 05/05/2023    SGUA 1.029 05/05/2023    PHUA 6.5 05/05/2023    PROTEINUA Trace (A) 05/05/2023    GLUCOSEUA 4+ (A) 05/05/2023    KETONESUA Negative 05/05/2023    BLOODUA Negative 05/05/2023    NITRITESUA Negative 05/05/2023    LEUKOCYTESUR Negative 05/05/2023    RBCUA 0-5 05/05/2023    WBCUA 6-10 (A) 05/05/2023    BACTERIA None Seen 05/05/2023    SQEPUA Few (A) 05/05/2023    HYALINECASTS None Seen 05/05/2023    CREATRANDUR 290.2 (H) 10/02/2023    PROTEINURINE <6.8 05/12/2021          Assessment       ICD-10-CM ICD-9-CM   1. Type 2 diabetes mellitus without complication, without long-term current use of insulin  E11.9 250.00   2. Chronic obstructive pulmonary disease, unspecified COPD type  J44.9 496   3. Hypertension, unspecified type  I10 401.9   4. Hyperlipidemia, unspecified hyperlipidemia type  E78.5 272.4   5. Cigarette nicotine dependence without complication [F17.210]  F17.210 305.1   6. Need for influenza vaccination  Z23 V04.81   7. BMI 27.0-27.9,adult  Z68.27 V85.23   8. Vitamin D deficiency  E55.9 268.9   9. Breast cancer screening by mammogram  Z12.31 V76.12        Plan     1. Type 2 diabetes mellitus without complication, without long-term current use of insulin    A1C 5.8  Meds continued and refilled.  ADA diet; more fruits and vegetables, lean meats, plant based sources of protein, less added sugar, less processed foods.   Medication compliance, and strict home glucose monitoring advised.  Goal A1C <7 %  Diabetic foot exam annually:  Diabetic eye exam annually:  Urine Microalbumin every 6 months:   - CBC Auto Differential; Future  - Comprehensive Metabolic Panel; Future  - Lipid Panel; Future  - TSH; Future  - Hemoglobin A1C; Future    2. Chronic  obstructive pulmonary disease, unspecified COPD type  CT lung stable 9/2023  Use inhalers as prescribed (rinse mouth after use of steroid inhalers).    Use long term inhalers daily and rescue inhaler as needed.    Avoid triggers (high humidity, strong odors, chemical fumes).    Report signs of upper respiratory infection as soon as possible for early treatment.    Practice/encouraged abdominal breathing.   Eat smaller, more frequent meals.   Flu shot recommended yearly.    Smoking cessation encouraged   - albuterol (PROVENTIL) 2.5 mg /3 mL (0.083 %) nebulizer solution; Take 3 mLs (2.5 mg total) by nebulization 4 (four) times daily as needed for Wheezing or Shortness of Breath.  Dispense: 120 each; Refill: 6  - albuterol (PROVENTIL/VENTOLIN HFA) 90 mcg/actuation inhaler; Inhale 1-2 puffs into the lungs every 4 (four) hours as needed for Wheezing or Shortness of Breath.  Dispense: 18 g; Refill: 4  - TRELEGY ELLIPTA 100-62.5-25 mcg DsDv; Inhale 1 puff into the lungs once daily.  Dispense: 1 each; Refill: 6    3. Hypertension, unspecified type  BP and HR stable. Med refills. DASH diet: Eat more fruits, vegetables, and low fat dairy foods.  (Less than 2 grams of sodium per day).  Maintain healthy weight with goal BMI <30.   Exercise 30 minutes per day 5 days per week.  Home medications refilled and continued.   Home BP monitoring encouraged with BP parameters given.    - CBC Auto Differential; Future  - Comprehensive Metabolic Panel; Future  - Lipid Panel; Future  - TSH; Future  - Hemoglobin A1C; Future    4. Hyperlipidemia, unspecified hyperlipidemia type  Lab Results   Component Value Date    LDL 64.00 10/02/2023    CHOL 137 10/02/2023    HDL 37 10/02/2023    TRIG 179 (H) 10/02/2023       Cont RX daily; refills given. Take Omega 3 daily.   Stressed importance of dietary modifications. Follow a low cholesterol, low saturated fat diet with less that 200mg of cholesterol a day.  Avoid fried foods and high saturated fats  (high saturated fats less than 7% of calories).  Add Flax Seed/Fish Oil supplements to diet. Increase dietary fiber.  Regular exercise can reduce LDL and raise HDL. Stressed importance of physical activity 5 times per week for 30 minutes per day.    - CBC Auto Differential; Future  - Comprehensive Metabolic Panel; Future  - Lipid Panel; Future  - TSH; Future  - Hemoglobin A1C; Future    5. Cigarette nicotine dependence without complication [F17.210]  Smoking cessation advised. Instructed on smoking cessation program through [facility] and pharmacological interventions to aid in cessation.  >5 minutes allotted to educate patient on the harms of smoking, the urgency to quit, and the development of a plan for smoking cessation.     6. Need for influenza vaccination  Admin today  - influenza 65up-adj (QUADRIVALENT ADJUVANTED PF) vaccine 0.5 mL    7. BMI 27.0-27.9,adult  Goal BMI <30.  Exercise 5 times a week for 30 minutes per day.  Avoid soda, simple sugars, excessive rice, potatoes or bread. Limit fast foods and fried foods.  Choose complex carbs in moderation (example: green vegetables, beans, oatmeal). Eat plenty of fresh fruits and vegetables with lean meats daily.  Do not skip meals. Eat a balanced portion size.  Avoid fad diets. Consider permanent healthy life style changes.       8. Vitamin D deficiency  Vitamin D level reviewed and is currently at goal, between 30-80 ng/mL. Continue OTC Vitamin D3 2000 IU daily.   - Vitamin D; Future    9. Breast cancer screening by mammogram    - Mammo Digital Screening Bilat w/ [facility]; Future        Current Outpatient Medications   Medication Instructions    albuterol (PROVENTIL) 2.5 mg, Nebulization, 4 times daily PRN    albuterol (PROVENTIL/VENTOLIN HFA) 90 mcg/actuation inhaler 1-2 puffs, Inhalation, Every 4 hours PRN    aspirin 81 mg, Oral, Daily    blood sugar diagnostic Strp To check BG 2 times daily, to use with insurance preferred meter    blood-glucose meter kit To check BG 2  times daily, to use with insurance preferred meter    diclofenac sodium (VOLTAREN) 2 g, Topical (Top), 4 times daily PRN    lancets Misc To check BG 2 times daily, to use with insurance preferred meter    metFORMIN (GLUCOPHAGE) 500 mg, Oral, Daily    metoprolol succinate (TOPROL-XL) 25 mg, Oral, 2 times daily    metoprolol succinate (TOPROL-XL) 50 mg, Oral, 2 times daily, ALONG WITH A 25MG TAB =75MG BID    nitroGLYCERIN (NITROSTAT) 0.4 mg, Sublingual, Every 5 min PRN    ONETOUCH DELICA PLUS LANCET 33 gauge Misc Topical (Top)    ONETOUCH ULTRA2 METER Misc SMARTSI Via Meter Daily    pravastatin (PRAVACHOL) 80 mg, Oral, Nightly    TRELEGY ELLIPTA 100-62.5-25 mcg DsDv 1 puff, Inhalation, Daily    triamcinolone acetonide 0.1% (KENALOG) 0.1 % cream Topical (Top), 2 times daily, Use as directed    vitamin D (VITAMIN D3) 2,000 Units, Oral, Daily       Orders Placed This Encounter   Procedures    Mammo Digital Screening Bilat w/ [facility]    CBC Auto Differential    Comprehensive Metabolic Panel    Lipid Panel    TSH    Hemoglobin A1C    Vitamin D         Future Appointments   Date Time Provider Department Center   3/7/2024 10:15 AM Patrica Genao PA-C [facility]   2024  8:00 AM [facility]    2024  9:30 AM [facility], [facility]         Follow up in about 6 months (around 4/10/2024) for lab review.    Labs thoroughly reviewed with patient. Medication refills addressed today.  RTC prn and 6 months, with labs 1 week prior to the apt.  COVID 19 precautions given to patient.  Patient voices understanding of all discharge instructions.      ANGELITO Soto

## 2023-10-10 NOTE — LETTER
I certify that (Name) Fadumo Lynch meets the requirements as outlined in #  (shown on reverse side) and qualifies for a mobility impaired license plate/hang-tag. I further understand that willful and false certification shall subject me to fines/imprisonment as outlined in R.S. 47:463.4 (G) (4). The applicant's information is as follows:    YOB: 1950            Race:White        Gender:Female    Address:  [address] [place] [address]    City:[place]                               State:[place]     Zip Code:[address]     []Permanently Impaired - Applicant has a total or lifelong condition of mobility impairment from which little or no improvement or recovery can reasonably be expected. A medical examiners certification is required on initial application only.      [] Temporarily Impaired - Applicant has a temporary condition of mobility impairment of which improvement or recovery can reasonably be expected. Applicant is entitled to a hangtag, which will be valid for one (1) year. A medical examiners certification is required for the renewal of the hangtag      [] Unable to appear in person at the Office of Motor Vehicles - Applicant must bring facial photo        Medical Examiner's Signature________________________________________ Date:10/10/23_________________________    Printed Name:___________________________________________________    State License #_____________________________    Address: [address] [place] [address]___                                     Phone Number: [phone]                                                                                                                                                                                                                  City: [place]__________________________________ State: [place] __Zip Code: [address] _______________    TO BE COMPLETED BY MOTOR VEHICLE ANALYST ONLY    MERLY   Lic. Plate #      Hangtag Control #   Hangtag ID #      Date Issued:     #:   Office #:

## 2023-10-31 ENCOUNTER — OFFICE VISIT (OUTPATIENT)
Dept: URGENT CARE | Facility: CLINIC | Age: 73
End: 2023-10-31
Payer: MEDICARE

## 2023-10-31 VITALS
DIASTOLIC BLOOD PRESSURE: 63 MMHG | WEIGHT: 157 LBS | OXYGEN SATURATION: 95 % | TEMPERATURE: 99 F | RESPIRATION RATE: 20 BRPM | HEART RATE: 77 BPM | HEIGHT: 62 IN | BODY MASS INDEX: 28.89 KG/M2 | SYSTOLIC BLOOD PRESSURE: 121 MMHG

## 2023-10-31 DIAGNOSIS — M79.641 RIGHT HAND PAIN: ICD-10-CM

## 2023-10-31 PROCEDURE — 99213 OFFICE O/P EST LOW 20 MIN: CPT | Mod: ,,, | Performed by: NURSE PRACTITIONER

## 2023-10-31 PROCEDURE — 99213 PR OFFICE/OUTPT VISIT, EST, LEVL III, 20-29 MIN: ICD-10-PCS | Mod: ,,, | Performed by: NURSE PRACTITIONER

## 2023-10-31 NOTE — PATIENT INSTRUCTIONS
Modify activity, wear splint  Tylenol or ibuprofen OTC as directed for pain  Follow-up with your primary care provider if symptoms worsen or persist as instructed   Will notify you of the final radiology x-ray report results

## 2023-10-31 NOTE — PROGRESS NOTES
"Subjective:      Patient ID: Fadumo Lynch is a 73 y.o. female.    Vitals:  height is 5' 2" (1.575 m) and weight is 71.2 kg (157 lb). Her oral temperature is 98.7 °F (37.1 °C). Her blood pressure is 121/63 and her pulse is 77. Her respiration is 20 and oxygen saturation is 95%.     Chief Complaint: Fall (Right hand pain after falling on cement steps within the hour. )    73-year-old female presents with right hand pain.   1 hour ago accidentally fell on concrete steps.  No decreased level of consciousness    Fall      Musculoskeletal:  Positive for pain (right hand).      Objective:     Physical Exam   Abdominal: Normal appearance.   Musculoskeletal:         General: Swelling and tenderness present. Injury: right hand swelling and pain.       Hands:    Neurological: She is alert.   Skin: Skin is warm and dry.   Nursing note and vitals reviewed.      Assessment:     1. Right hand pain        Plan:   Modify activity, wear splint  Tylenol or ibuprofen OTC as directed for pain  Follow-up with your primary care provider if symptoms worsen or persist as instructed   Will notify you of the final radiology x-ray report results  Right hand pain  -     XR HAND COMPLETE 3 VIEW RIGHT; Future; Expected date: 10/31/2023  -     SPLINT FOR HOME USE                      "

## 2023-11-26 ENCOUNTER — OFFICE VISIT (OUTPATIENT)
Dept: URGENT CARE | Facility: CLINIC | Age: 73
End: 2023-11-26
Payer: MEDICARE

## 2023-11-26 VITALS
SYSTOLIC BLOOD PRESSURE: 137 MMHG | OXYGEN SATURATION: 94 % | HEIGHT: 62 IN | BODY MASS INDEX: 28.34 KG/M2 | DIASTOLIC BLOOD PRESSURE: 76 MMHG | WEIGHT: 154 LBS | RESPIRATION RATE: 18 BRPM | TEMPERATURE: 98 F | HEART RATE: 56 BPM

## 2023-11-26 DIAGNOSIS — M79.641 RIGHT HAND PAIN: ICD-10-CM

## 2023-11-26 DIAGNOSIS — S62.352D CLOSED NONDISPLACED FRACTURE OF SHAFT OF THIRD METACARPAL BONE OF RIGHT HAND WITH ROUTINE HEALING, SUBSEQUENT ENCOUNTER: Primary | ICD-10-CM

## 2023-11-26 PROCEDURE — 99213 PR OFFICE/OUTPT VISIT, EST, LEVL III, 20-29 MIN: ICD-10-PCS | Mod: ,,, | Performed by: FAMILY MEDICINE

## 2023-11-26 PROCEDURE — 99213 OFFICE O/P EST LOW 20 MIN: CPT | Mod: ,,, | Performed by: FAMILY MEDICINE

## 2023-11-26 NOTE — PATIENT INSTRUCTIONS
He will be called for an appointment with orthopedics.  If you have not received this call in the next 2 days please call us back and let us know    Please continue using icy hot and Tylenol for your pain    Please go the emergency room if you have pain that becomes unbearable

## 2023-11-26 NOTE — PROGRESS NOTES
Patient ID: [id number]     Chief Complaint:  Right hand pain    History of Present Illness:     Fadumo Lynch is a 73 y.o. female  with a history of COPD, hypertension, SVTs, HFrEF, ?CKD, type 2 diabetes, osteoporosis, arthritis, current smoker, who presents today for symptoms of Wrist Injury (Pain in right arm and wrist x one month. Seen in clinic on 10/31/2023.)    Patient fell on her right hand on October 31, 2023, came to the he sent home in an arm splint.  She reports that she is been having pain in her hand worsening since that time.  She is been taking 3 Tylenol at a time and the pain gets worse as the day goes on.  She thinks it is fractured and she is coming in to have it re-evaluated.  Also reports minor right shoulder pain and minor right elbow pain, but she believes this is from how she holds her arm in the splint.      Past Medical History:     ----------------------------  CKD (chronic kidney disease)  COPD (chronic obstructive pulmonary disease)  Diabetes mellitus  Hyperlipidemia  Hypertension  Supraventricular tachycardia     Past Surgical History:   Procedure Laterality Date    TONSILLECTOMY AND ADENOIDECTOMY  1964    VAGINAL HYSTERECTOMY  1986       Review of patient's allergies indicates:   Allergen Reactions    Medrol [methylprednisolone] Itching    Naproxen Hives    Zinc sulfate      Other reaction(s): LATE EFFECT OF BURNS OF OTHER SPECIFIED SITES       Outpatient Medications Marked as Taking for the 11/26/23 encounter (Office Visit) with Lalit Johnson MD   Medication Sig Dispense Refill    albuterol (PROVENTIL) 2.5 mg /3 mL (0.083 %) nebulizer solution Take 3 mLs (2.5 mg total) by nebulization 4 (four) times daily as needed for Wheezing or Shortness of Breath. 120 each 6    aspirin 81 MG Chew Take 81 mg by mouth once daily.      metFORMIN (GLUCOPHAGE) 500 MG tablet Take 1 tablet (500 mg total) by mouth once daily. 90 tablet 1    metoprolol succinate (TOPROL-XL) 25 MG 24 hr tablet  "Take 1 tablet (25 mg total) by mouth 2 (two) times daily. 180 tablet 3    metoprolol succinate (TOPROL-XL) 50 MG 24 hr tablet Take 1 tablet (50 mg total) by mouth 2 (two) times daily. ALONG WITH A 25MG TAB =75MG  tablet 3    nitroGLYCERIN (NITROSTAT) 0.4 MG SL tablet Place 1 tablet (0.4 mg total) under the tongue every 5 (five) minutes as needed for Chest pain. 25 tablet 3    ONETOUCH DELICA PLUS LANCET 33 gauge Misc Apply topically.      ONETOUCH ULTRA2 METER Misc SMARTSI Via Meter Daily      pravastatin (PRAVACHOL) 80 MG tablet Take 1 tablet (80 mg total) by mouth every evening. 90 tablet 3    TRELEGY ELLIPTA 100-62.5-25 mcg DsDv Inhale 1 puff into the lungs once daily. 1 each 6    vitamin D (VITAMIN D3) 1000 units Tab Take 2,000 Units by mouth once daily.         Social History     Socioeconomic History    Marital status:     Number of children: 2   Occupational History    Occupation: Retired   Tobacco Use    Smoking status: Every Day     Current packs/day: 0.50     Average packs/day: 0.5 packs/day for 56.9 years (28.5 ttl pk-yrs)     Types: Cigarettes     Start date: 1967     Passive exposure: Never    Smokeless tobacco: Never    Tobacco comments:     Offered cessation; pt refused.   Substance and Sexual Activity    Alcohol use: Never    Drug use: Never        Family History   Problem Relation Age of Onset    Colon cancer Mother     Cancer Father         Subjective:     Review of Systems   Constitutional:  Negative for chills, fever and malaise/fatigue.   Musculoskeletal:  Positive for joint pain. Negative for back pain and falls.   Neurological:  Negative for dizziness, tingling, sensory change, speech change, focal weakness, seizures and loss of consciousness.       Objective:     /76   Pulse (!) 56   Temp 98 °F (36.7 °C) (Oral)   Resp 18   Ht 5' 2" (1.575 m)   Wt 69.9 kg (154 lb)   SpO2 (!) 94% Comment: Hx of COPD  BMI 28.17 kg/m²     Physical Exam  Constitutional:       " General: She is not in acute distress.     Appearance: Normal appearance. She is not ill-appearing or toxic-appearing.   HENT:      Head: Normocephalic and atraumatic.   Cardiovascular:      Rate and Rhythm: Normal rate and regular rhythm.   Pulmonary:      Effort: Pulmonary effort is normal.      Breath sounds: Normal breath sounds.   Musculoskeletal:         General: Tenderness present. No swelling, deformity or signs of injury.      Cervical back: No rigidity or tenderness.      Right lower leg: No edema.      Left lower leg: No edema.      Comments:     Right upper extremity:  Obvious deformity over the dorsal aspect of the distal 2nd and 3rd metacarpals  No skin changes  Mild tenderness over the deltoid/supraspinatus near the lateral aspect of the acromion  No elbow tenderness, no tenderness over the radius or ulna  Tenderness over the TFCC  Motor and sensory is normal in the entirety of the right upper extremity at all joints  Pain elicited during resisted extension of the 2nd and 3rd MCPs  Cap refill is normal       Lymphadenopathy:      Cervical: No cervical adenopathy.   Neurological:      General: No focal deficit present.      Mental Status: She is alert and oriented to person, place, and time.      Cranial Nerves: No cranial nerve deficit.      Sensory: No sensory deficit.      Motor: No weakness.      Coordination: Coordination normal.      Gait: Gait normal.      Deep Tendon Reflexes: Reflexes normal.   Psychiatric:         Mood and Affect: Mood normal.         Behavior: Behavior normal.         Thought Content: Thought content normal.           Assessment & Plan:       ICD-10-CM ICD-9-CM   1. Closed nondisplaced fracture of shaft of third metacarpal bone of right hand with routine healing, subsequent encounter  S62.352D V54.19   2. Right hand pain  M79.641 729.5        1. Closed nondisplaced fracture of shaft of third metacarpal bone of right hand with routine healing, subsequent encounter  -      Ambulatory referral/consult to Orthopedics    2. Right hand pain  -     X-Ray Hand 2 View Right; Future; Expected date: 11/26/2023      X-ray read by Radiology, both 1 month ago and today, reports no fractures.  I see a nondisplaced fracture of the shaft of the 3rd metacarpal.  I will send her to Orthopedics.      Can not give steroids because of diabetes and she reports she has prednisone at home and it makes her itch anyway.  Can not give NSAIDs because she says that makes her itch as well.  She does not want opioids of any sort.  She does not want to use topical Voltaren because she says it does not work.  She says icy hot works so I recommend that she keep using that.

## 2023-11-27 ENCOUNTER — OFFICE VISIT (OUTPATIENT)
Dept: ORTHOPEDICS | Facility: CLINIC | Age: 73
End: 2023-11-27
Payer: MEDICARE

## 2023-11-27 VITALS
WEIGHT: 153 LBS | BODY MASS INDEX: 28.16 KG/M2 | HEART RATE: 71 BPM | SYSTOLIC BLOOD PRESSURE: 118 MMHG | DIASTOLIC BLOOD PRESSURE: 70 MMHG | HEIGHT: 62 IN

## 2023-11-27 DIAGNOSIS — S62.352A NONDISPLACED FRACTURE OF SHAFT OF THIRD METACARPAL BONE, RIGHT HAND, INITIAL ENCOUNTER FOR CLOSED FRACTURE: Primary | ICD-10-CM

## 2023-11-27 PROCEDURE — 3061F PR NEG MICROALBUMINURIA RESULT DOCUMENTED/REVIEW: ICD-10-PCS | Mod: CPTII,,, | Performed by: STUDENT IN AN ORGANIZED HEALTH CARE EDUCATION/TRAINING PROGRAM

## 2023-11-27 PROCEDURE — 3078F DIAST BP <80 MM HG: CPT | Mod: CPTII,,, | Performed by: STUDENT IN AN ORGANIZED HEALTH CARE EDUCATION/TRAINING PROGRAM

## 2023-11-27 PROCEDURE — 3066F NEPHROPATHY DOC TX: CPT | Mod: CPTII,,, | Performed by: STUDENT IN AN ORGANIZED HEALTH CARE EDUCATION/TRAINING PROGRAM

## 2023-11-27 PROCEDURE — 1159F MED LIST DOCD IN RCRD: CPT | Mod: CPTII,,, | Performed by: STUDENT IN AN ORGANIZED HEALTH CARE EDUCATION/TRAINING PROGRAM

## 2023-11-27 PROCEDURE — 3078F PR MOST RECENT DIASTOLIC BLOOD PRESSURE < 80 MM HG: ICD-10-PCS | Mod: CPTII,,, | Performed by: STUDENT IN AN ORGANIZED HEALTH CARE EDUCATION/TRAINING PROGRAM

## 2023-11-27 PROCEDURE — 99203 OFFICE O/P NEW LOW 30 MIN: CPT | Mod: 57,,, | Performed by: STUDENT IN AN ORGANIZED HEALTH CARE EDUCATION/TRAINING PROGRAM

## 2023-11-27 PROCEDURE — 3044F PR MOST RECENT HEMOGLOBIN A1C LEVEL <7.0%: ICD-10-PCS | Mod: CPTII,,, | Performed by: STUDENT IN AN ORGANIZED HEALTH CARE EDUCATION/TRAINING PROGRAM

## 2023-11-27 PROCEDURE — 3066F PR DOCUMENTATION OF TREATMENT FOR NEPHROPATHY: ICD-10-PCS | Mod: CPTII,,, | Performed by: STUDENT IN AN ORGANIZED HEALTH CARE EDUCATION/TRAINING PROGRAM

## 2023-11-27 PROCEDURE — 4010F ACE/ARB THERAPY RXD/TAKEN: CPT | Mod: CPTII,,, | Performed by: STUDENT IN AN ORGANIZED HEALTH CARE EDUCATION/TRAINING PROGRAM

## 2023-11-27 PROCEDURE — 3044F HG A1C LEVEL LT 7.0%: CPT | Mod: CPTII,,, | Performed by: STUDENT IN AN ORGANIZED HEALTH CARE EDUCATION/TRAINING PROGRAM

## 2023-11-27 PROCEDURE — 3074F SYST BP LT 130 MM HG: CPT | Mod: CPTII,,, | Performed by: STUDENT IN AN ORGANIZED HEALTH CARE EDUCATION/TRAINING PROGRAM

## 2023-11-27 PROCEDURE — 3061F NEG MICROALBUMINURIA REV: CPT | Mod: CPTII,,, | Performed by: STUDENT IN AN ORGANIZED HEALTH CARE EDUCATION/TRAINING PROGRAM

## 2023-11-27 PROCEDURE — 3074F PR MOST RECENT SYSTOLIC BLOOD PRESSURE < 130 MM HG: ICD-10-PCS | Mod: CPTII,,, | Performed by: STUDENT IN AN ORGANIZED HEALTH CARE EDUCATION/TRAINING PROGRAM

## 2023-11-27 PROCEDURE — 4010F PR ACE/ARB THEARPY RXD/TAKEN: ICD-10-PCS | Mod: CPTII,,, | Performed by: STUDENT IN AN ORGANIZED HEALTH CARE EDUCATION/TRAINING PROGRAM

## 2023-11-27 PROCEDURE — 26600 TREAT METACARPAL FRACTURE: CPT | Mod: RT,,, | Performed by: STUDENT IN AN ORGANIZED HEALTH CARE EDUCATION/TRAINING PROGRAM

## 2023-11-27 PROCEDURE — 26600 PR CLOSED RX METACARPAL FX: ICD-10-PCS | Mod: RT,,, | Performed by: STUDENT IN AN ORGANIZED HEALTH CARE EDUCATION/TRAINING PROGRAM

## 2023-11-27 PROCEDURE — 3008F PR BODY MASS INDEX (BMI) DOCUMENTED: ICD-10-PCS | Mod: CPTII,,, | Performed by: STUDENT IN AN ORGANIZED HEALTH CARE EDUCATION/TRAINING PROGRAM

## 2023-11-27 PROCEDURE — 99203 PR OFFICE/OUTPT VISIT, NEW, LEVL III, 30-44 MIN: ICD-10-PCS | Mod: 57,,, | Performed by: STUDENT IN AN ORGANIZED HEALTH CARE EDUCATION/TRAINING PROGRAM

## 2023-11-27 PROCEDURE — 1159F PR MEDICATION LIST DOCUMENTED IN MEDICAL RECORD: ICD-10-PCS | Mod: CPTII,,, | Performed by: STUDENT IN AN ORGANIZED HEALTH CARE EDUCATION/TRAINING PROGRAM

## 2023-11-27 PROCEDURE — 3008F BODY MASS INDEX DOCD: CPT | Mod: CPTII,,, | Performed by: STUDENT IN AN ORGANIZED HEALTH CARE EDUCATION/TRAINING PROGRAM

## 2023-11-28 NOTE — PROGRESS NOTES
Chief Complaint:  Right hand injury    Consulting Physician: Lalit Johnson MD    History of present illness:    Patient is a 73-year-old right-hand dominant retired female who presents for initial evaluation of a right hand injury she sustained from a fall on 10/31/2023.  She states that she was trying to brace herself and fell on her right hand.  She is had pain in the hand ever since.  She presented to the emergency department where she got an x-ray which was read as negative at the time.  She continued to have pain so she went back to the emergency department where another x-ray showed that she could have a nondisplaced 3rd metacarpal fracture.  She complains of pain localized to the 3rd metacarpal.  It does not radiate.  It is a sharp pain worse with activity.  She is been wearing a Velcro wrist brace but she states that it is uncomfortable.  She states that her hand is swollen.  No numbness or tingling into the fingertips.  She had no issues with the hand prior to her fall    Past Medical History:   Diagnosis Date    CKD (chronic kidney disease)     COPD (chronic obstructive pulmonary disease)     Diabetes mellitus     Hyperlipidemia     Hypertension     Supraventricular tachycardia        Past Surgical History:   Procedure Laterality Date    TONSILLECTOMY AND ADENOIDECTOMY  1964    VAGINAL HYSTERECTOMY  1986       Current Outpatient Medications   Medication Sig    albuterol (PROVENTIL) 2.5 mg /3 mL (0.083 %) nebulizer solution Take 3 mLs (2.5 mg total) by nebulization 4 (four) times daily as needed for Wheezing or Shortness of Breath.    aspirin 81 MG Chew Take 81 mg by mouth once daily.    metFORMIN (GLUCOPHAGE) 500 MG tablet Take 1 tablet (500 mg total) by mouth once daily.    metoprolol succinate (TOPROL-XL) 25 MG 24 hr tablet Take 1 tablet (25 mg total) by mouth 2 (two) times daily.    metoprolol succinate (TOPROL-XL) 50 MG 24 hr tablet Take 1 tablet (50 mg total) by mouth 2 (two) times daily. ALONG WITH  A 25MG TAB =75MG BID    nitroGLYCERIN (NITROSTAT) 0.4 MG SL tablet Place 1 tablet (0.4 mg total) under the tongue every 5 (five) minutes as needed for Chest pain.    ONETOUCH DELICA PLUS LANCET 33 gauge Misc Apply topically.    ONETOUCH ULTRA2 METER Misc SMARTSI Via Meter Daily    pravastatin (PRAVACHOL) 80 MG tablet Take 1 tablet (80 mg total) by mouth every evening.    TRELEGY ELLIPTA 100-62.5-25 mcg DsDv Inhale 1 puff into the lungs once daily.    vitamin D (VITAMIN D3) 1000 units Tab Take 2,000 Units by mouth once daily.    albuterol (PROVENTIL/VENTOLIN HFA) 90 mcg/actuation inhaler Inhale 1-2 puffs into the lungs every 4 (four) hours as needed for Wheezing or Shortness of Breath. (Patient not taking: Reported on 2023)    blood sugar diagnostic Strp To check BG 2 times daily, to use with insurance preferred meter (Patient not taking: Reported on 10/10/2023)    blood-glucose meter kit To check BG 2 times daily, to use with insurance preferred meter (Patient not taking: Reported on 10/10/2023)    diclofenac sodium (VOLTAREN) 1 % Gel Apply 2 g topically 4 (four) times daily as needed (hand pain). (Patient not taking: Reported on 10/10/2023)    lancets Misc To check BG 2 times daily, to use with insurance preferred meter (Patient not taking: Reported on 10/10/2023)    triamcinolone acetonide 0.1% (KENALOG) 0.1 % cream Apply topically 2 (two) times daily. Use as directed (Patient not taking: Reported on 10/10/2023)     No current facility-administered medications for this visit.       Review of patient's allergies indicates:   Allergen Reactions    Medrol [methylprednisolone] Itching    Naproxen Hives    Zinc sulfate      Other reaction(s): LATE EFFECT OF BURNS OF OTHER SPECIFIED SITES       Family History   Problem Relation Age of Onset    Colon cancer Mother     Cancer Father        Social History     Socioeconomic History    Marital status:     Number of children: 2   Occupational History     "Occupation: Retired   Tobacco Use    Smoking status: Every Day     Current packs/day: 0.50     Average packs/day: 0.5 packs/day for 56.9 years (28.5 ttl pk-yrs)     Types: Cigarettes     Start date: 1/1/1967     Passive exposure: Never    Smokeless tobacco: Never    Tobacco comments:     Offered cessation; pt refused.   Substance and Sexual Activity    Alcohol use: Never    Drug use: Never       Review of Systems:    Constitution:   Denies chills, fever, and sweats.  HENT:   Denies headaches or blurry vision.  Cardiovascular:  Denies chest pain or irregular heart beat.  Respiratory:   Denies cough or shortness of breath.  Gastrointestinal:  Denies abdominal pain, nausea, or vomiting.  Musculoskeletal:   Denies muscle cramps.  Neurological:   Denies dizziness or focal weakness.  Psychiatric/Behavior: Normal mental status.  Hematology/Lymph:  Denies bleeding problem or easy bruising/bleeding.  Skin:    Denies rash or suspicious lesions.    Examination:    Vital Signs:    Vitals:    11/27/23 1540 11/27/23 1543   BP: (!) 143/69 118/70   Pulse: 82 71   Weight: 69.4 kg (153 lb)    Height: 5' 2" (1.575 m)        Body mass index is 27.98 kg/m².    Constitution:   Well-developed, well nourished patient in no acute distress.  Neurological:   Alert and oriented x 3 and cooperative to examination.     Psychiatric/Behavior: Normal mental status.  Respiratory:   No shortness of breath.  Eyes:    Extraoccular muscles intact  Skin:    No scars, rash or suspicious lesions.    MSK:   Right hand:  No open wounds or rashes.  Tenderness to palpation over the 3rd metacarpal shaft.  She is able to make a fist and extend her digits fully.  There is no extensor lag or malrotation of the digits.  There is some swelling over the dorsal aspect of the hand.  Sensation light touch intact in median ulnar radial distribution.  Radial pulse 2 +hand is warm well perfused    Imaging:   X-ray of the right hand shows nondisplaced 3rd metacarpal " fracture     Assessment:  Nondisplaced 3rd metacarpal fracture    Plan:  We can treat this non operatively.  I will place her into an Exos cast today.  I explained that I would like her to come out of her cast several times per day to work on digit range of motion so that she can continue making a fist.  No lifting pushing pulling with the right hand greater than 5 lb.  I can see her back in 4-6 weeks to make sure she is healed    Follow Up:  4-6 weeks  Xray at next visit:  Right hand

## 2023-12-11 ENCOUNTER — OFFICE VISIT (OUTPATIENT)
Dept: INTERNAL MEDICINE | Facility: CLINIC | Age: 73
End: 2023-12-11
Payer: MEDICARE

## 2023-12-11 ENCOUNTER — CLINICAL SUPPORT (OUTPATIENT)
Dept: INTERNAL MEDICINE | Facility: CLINIC | Age: 73
End: 2023-12-11
Attending: NURSE PRACTITIONER
Payer: MEDICARE

## 2023-12-11 VITALS
RESPIRATION RATE: 18 BRPM | HEIGHT: 62 IN | WEIGHT: 154 LBS | SYSTOLIC BLOOD PRESSURE: 117 MMHG | TEMPERATURE: 98 F | DIASTOLIC BLOOD PRESSURE: 63 MMHG | BODY MASS INDEX: 28.34 KG/M2 | HEART RATE: 66 BPM

## 2023-12-11 DIAGNOSIS — I50.20 HEART FAILURE WITH REDUCED EJECTION FRACTION: ICD-10-CM

## 2023-12-11 DIAGNOSIS — Z85.828 HISTORY OF BASAL CELL CARCINOMA (BCC) OF SKIN: ICD-10-CM

## 2023-12-11 DIAGNOSIS — E11.9 TYPE 2 DIABETES MELLITUS WITHOUT COMPLICATION, WITHOUT LONG-TERM CURRENT USE OF INSULIN: ICD-10-CM

## 2023-12-11 DIAGNOSIS — F17.210 CIGARETTE NICOTINE DEPENDENCE WITHOUT COMPLICATION: ICD-10-CM

## 2023-12-11 DIAGNOSIS — I47.10 SUPRAVENTRICULAR TACHYCARDIA: ICD-10-CM

## 2023-12-11 DIAGNOSIS — L98.9 EXTERNAL NASAL LESION: ICD-10-CM

## 2023-12-11 LAB
LEFT EYE DM RETINOPATHY: NEGATIVE
RIGHT EYE DM RETINOPATHY: NEGATIVE

## 2023-12-11 PROCEDURE — 4010F ACE/ARB THERAPY RXD/TAKEN: CPT | Mod: CPTII,,, | Performed by: NURSE PRACTITIONER

## 2023-12-11 PROCEDURE — 99406 BEHAV CHNG SMOKING 3-10 MIN: CPT | Mod: S$PBB,,, | Performed by: NURSE PRACTITIONER

## 2023-12-11 PROCEDURE — 1159F PR MEDICATION LIST DOCUMENTED IN MEDICAL RECORD: ICD-10-PCS | Mod: CPTII,,, | Performed by: NURSE PRACTITIONER

## 2023-12-11 PROCEDURE — 99406 PR TOBACCO USE CESSATION INTERMEDIATE 3-10 MINUTES: ICD-10-PCS | Mod: S$PBB,,, | Performed by: NURSE PRACTITIONER

## 2023-12-11 PROCEDURE — 3044F PR MOST RECENT HEMOGLOBIN A1C LEVEL <7.0%: ICD-10-PCS | Mod: CPTII,,, | Performed by: NURSE PRACTITIONER

## 2023-12-11 PROCEDURE — 3066F NEPHROPATHY DOC TX: CPT | Mod: CPTII,,, | Performed by: NURSE PRACTITIONER

## 2023-12-11 PROCEDURE — 99215 OFFICE O/P EST HI 40 MIN: CPT | Mod: PBBFAC | Performed by: NURSE PRACTITIONER

## 2023-12-11 PROCEDURE — 3078F PR MOST RECENT DIASTOLIC BLOOD PRESSURE < 80 MM HG: ICD-10-PCS | Mod: CPTII,,, | Performed by: NURSE PRACTITIONER

## 2023-12-11 PROCEDURE — 3074F PR MOST RECENT SYSTOLIC BLOOD PRESSURE < 130 MM HG: ICD-10-PCS | Mod: CPTII,,, | Performed by: NURSE PRACTITIONER

## 2023-12-11 PROCEDURE — 4010F PR ACE/ARB THEARPY RXD/TAKEN: ICD-10-PCS | Mod: CPTII,,, | Performed by: NURSE PRACTITIONER

## 2023-12-11 PROCEDURE — 1159F MED LIST DOCD IN RCRD: CPT | Mod: CPTII,,, | Performed by: NURSE PRACTITIONER

## 2023-12-11 PROCEDURE — 3074F SYST BP LT 130 MM HG: CPT | Mod: CPTII,,, | Performed by: NURSE PRACTITIONER

## 2023-12-11 PROCEDURE — 1100F PTFALLS ASSESS-DOCD GE2>/YR: CPT | Mod: CPTII,,, | Performed by: NURSE PRACTITIONER

## 2023-12-11 PROCEDURE — 3078F DIAST BP <80 MM HG: CPT | Mod: CPTII,,, | Performed by: NURSE PRACTITIONER

## 2023-12-11 PROCEDURE — 3008F PR BODY MASS INDEX (BMI) DOCUMENTED: ICD-10-PCS | Mod: CPTII,,, | Performed by: NURSE PRACTITIONER

## 2023-12-11 PROCEDURE — 3288F PR FALLS RISK ASSESSMENT DOCUMENTED: ICD-10-PCS | Mod: CPTII,,, | Performed by: NURSE PRACTITIONER

## 2023-12-11 PROCEDURE — 99214 PR OFFICE/OUTPT VISIT, EST, LEVL IV, 30-39 MIN: ICD-10-PCS | Mod: 25,S$PBB,, | Performed by: NURSE PRACTITIONER

## 2023-12-11 PROCEDURE — 1126F AMNT PAIN NOTED NONE PRSNT: CPT | Mod: CPTII,,, | Performed by: NURSE PRACTITIONER

## 2023-12-11 PROCEDURE — 3044F HG A1C LEVEL LT 7.0%: CPT | Mod: CPTII,,, | Performed by: NURSE PRACTITIONER

## 2023-12-11 PROCEDURE — 1160F PR REVIEW ALL MEDS BY PRESCRIBER/CLIN PHARMACIST DOCUMENTED: ICD-10-PCS | Mod: CPTII,,, | Performed by: NURSE PRACTITIONER

## 2023-12-11 PROCEDURE — 3066F PR DOCUMENTATION OF TREATMENT FOR NEPHROPATHY: ICD-10-PCS | Mod: CPTII,,, | Performed by: NURSE PRACTITIONER

## 2023-12-11 PROCEDURE — 3061F NEG MICROALBUMINURIA REV: CPT | Mod: CPTII,,, | Performed by: NURSE PRACTITIONER

## 2023-12-11 PROCEDURE — 99214 OFFICE O/P EST MOD 30 MIN: CPT | Mod: 25,S$PBB,, | Performed by: NURSE PRACTITIONER

## 2023-12-11 PROCEDURE — 3008F BODY MASS INDEX DOCD: CPT | Mod: CPTII,,, | Performed by: NURSE PRACTITIONER

## 2023-12-11 PROCEDURE — 1126F PR PAIN SEVERITY QUANTIFIED, NO PAIN PRESENT: ICD-10-PCS | Mod: CPTII,,, | Performed by: NURSE PRACTITIONER

## 2023-12-11 PROCEDURE — 1100F PR PT FALLS ASSESS DOC 2+ FALLS/FALL W/INJURY/YR: ICD-10-PCS | Mod: CPTII,,, | Performed by: NURSE PRACTITIONER

## 2023-12-11 PROCEDURE — 3288F FALL RISK ASSESSMENT DOCD: CPT | Mod: CPTII,,, | Performed by: NURSE PRACTITIONER

## 2023-12-11 PROCEDURE — 1160F RVW MEDS BY RX/DR IN RCRD: CPT | Mod: CPTII,,, | Performed by: NURSE PRACTITIONER

## 2023-12-11 PROCEDURE — 3061F PR NEG MICROALBUMINURIA RESULT DOCUMENTED/REVIEW: ICD-10-PCS | Mod: CPTII,,, | Performed by: NURSE PRACTITIONER

## 2023-12-11 RX ORDER — METOPROLOL SUCCINATE 50 MG/1
50 TABLET, EXTENDED RELEASE ORAL 2 TIMES DAILY
Qty: 180 TABLET | Refills: 1 | Status: SHIPPED | OUTPATIENT
Start: 2023-12-11 | End: 2024-03-07 | Stop reason: SDUPTHER

## 2023-12-11 RX ORDER — METOPROLOL SUCCINATE 25 MG/1
25 TABLET, EXTENDED RELEASE ORAL 2 TIMES DAILY
Qty: 180 TABLET | Refills: 1 | Status: SHIPPED | OUTPATIENT
Start: 2023-12-11 | End: 2024-03-07 | Stop reason: SDUPTHER

## 2023-12-11 RX ORDER — ACETAMINOPHEN 500 MG
500 TABLET ORAL EVERY 6 HOURS PRN
COMMUNITY

## 2023-12-11 NOTE — PROGRESS NOTES
[facility]  ANGELITO Soto     Patient Name: Fadumo Lynch   : 1950  MRN:[id number]     Chief Complaint     Chief Complaint   Patient presents with    Follow-up     Request ENT referral for questionable cancer to nose        History of Present Illness     73 year old white female, presents in clinic requesting ENT referral. States red spot/lesion on nose that concerns her and will not heal. Reports itching, and peeling a scab off. Oozing a clear drainage sometimes. Onset 8 months.   H/o basal cell carcinoma (nose). Previously following [facility] ENT; LOV 3/22/2021.    PMH SVT, HLD, HTN, CKD, COPD, T2DM, Osteopenia, multinodular thyroid, basal cell carcinoma, tobacco user. Pt states she is not ready to quit smoking; 10 cig/d. Pt is followed by Renal clinic for stage 3 CKD, cardiology clinic for SVT and medications, and ENT for basal cell carcinoma (nasal lesion) and thyroid nodules. Reports significant improvement in her palpitations with the metoprolol succinate 75 mg twice daily dose as per Cardio. Pulmonary clinic is following for pulmonary nodule. Denies chest pain, shortness of breath, cough, fever, headache, dizziness, weakness, abdominal pain, nausea, vomiting, diarrhea, constipation, dysuria, depression, anxiety.     Thyroid US FINDINGS:2022  Examination reveals the right hemithyroid to measure 4.4 cm x 1.9 x 1.6 cm, left earnest thyroid measures 3.7 x 1.7 x 1.5 cm isthmus measures 4.3 mm in thickness  On the right multiple anechoic/hypoechoic nodules are identified within the right lobe of the thyroid the largest measuring 1.2 x 1.4 x 0.6 cm mesh least stable to minimally enlarged as compared with the previous exam other subcentimeter nodules are identified largest measuring 6 mm x 5 mm x 3 mm.  On the left multiple subcentimeter nodules are identified a larger subcentimeter nodule measures 1.1 by 1 x 8 mm corresponding to a TI-RADS type 4 nodule follow-up is recommended if  "greater than 1 cm at 1 year, 2 years, 3 years and 5 years.  No other abnormalities  Impression:  Nodules as above with recommendations as above.  The subcentimeter nodules do not meet criteria for biopsy or surveillance.  No significant change since June 2019        Mammogram 12/13/2022; BIRADS 1  DEXA 12/13/2022; Normal bone mineral density of the lumbar vertebrae. Improved osteopenia of the femurs.        Exercise nuclear stress test on Florina 15, 2021 which revealed no ischemia and no scar.   ECHO 9/2020; EF 40 %  Cologuard neg 10/19/2022  Following ENT; LOV 3/22/2021  Diabetic fundus; 1/2022 No retinopathy                      Review of Systems     Review of Systems   Constitutional: Negative.    HENT: Negative.     Eyes: Negative.    Respiratory: Negative.     Cardiovascular: Negative.    Gastrointestinal: Negative.    Endocrine: Negative.    Genitourinary: Negative.    Musculoskeletal: Negative.    Integumentary:  Positive for mole/lesion.        Nose external Negative.   Allergic/Immunologic: Negative.    Neurological: Negative.    Hematological: Negative.    Psychiatric/Behavioral: Negative.     All other systems reviewed and are negative.       Physical Examination     Visit Vitals  /63 (BP Location: Left arm, Patient Position: Sitting, BP Method: Medium (Automatic))   Pulse 66   Temp 97.9 °F (36.6 °C) (Oral)   Resp 18   Ht 5' 2" (1.575 m)   Wt 69.9 kg (154 lb)   BMI 28.17 kg/m²        BP Readings from Last 6 Encounters:   12/11/23 117/63   11/27/23 118/70   11/26/23 137/76   10/31/23 121/63   10/10/23 112/60   09/18/23 103/70   ]    Wt Readings from Last 6 Encounters:   12/11/23 69.9 kg (154 lb)   11/27/23 69.4 kg (153 lb)   11/26/23 69.9 kg (154 lb)   10/31/23 71.2 kg (157 lb)   10/10/23 69.4 kg (153 lb)   09/18/23 70.1 kg (154 lb 9.6 oz)   ]      Physical Exam  Vitals and nursing note reviewed.   Constitutional:       Appearance: Normal appearance.   HENT:      Head: Normocephalic and atraumatic.     "  Right Ear: Tympanic membrane, ear canal and external ear normal.      Left Ear: Tympanic membrane, ear canal and external ear normal.      Nose: Nose normal.      Mouth/Throat:      Mouth: Mucous membranes are moist.      Pharynx: Oropharynx is clear.   Eyes:      Extraocular Movements: Extraocular movements intact.      Conjunctiva/sclera: Conjunctivae normal.      Pupils: Pupils are equal, round, and reactive to light.   Cardiovascular:      Rate and Rhythm: Normal rate and regular rhythm.      Pulses: Normal pulses.      Heart sounds: Normal heart sounds.   Pulmonary:      Effort: Pulmonary effort is normal.      Breath sounds: Normal breath sounds.   Abdominal:      General: Abdomen is flat. Bowel sounds are normal.      Palpations: Abdomen is soft.   Musculoskeletal:         General: Normal range of motion.      Cervical back: Normal range of motion and neck supple.   Skin:     General: Skin is warm and dry.      Capillary Refill: Capillary refill takes less than 2 seconds.      Findings: Erythema and lesion present.      Comments: Nose external see picture   Neurological:      General: No focal deficit present.      Mental Status: She is alert and oriented to person, place, and time. Mental status is at baseline.   Psychiatric:         Mood and Affect: Mood normal.         Behavior: Behavior normal.         Thought Content: Thought content normal.         Judgment: Judgment normal.          Labs / Imaging     Chemistry:  Lab Results   Component Value Date     10/02/2023    K 4.5 10/02/2023    CHLORIDE 100 10/02/2023    BUN 9.5 (L) 10/02/2023    CREATININE 0.85 10/02/2023    EGFRNORACEVR >60 10/02/2023    GLUCOSE 108 10/02/2023    CALCIUM 9.4 10/02/2023    ALKPHOS 117 10/02/2023    LABPROT 7.7 (H) 10/02/2023    ALBUMIN 3.4 10/02/2023    BILIDIR 0.2 08/31/2021    IBILI 0.20 08/31/2021    AST 17 10/02/2023    ALT 17 10/02/2023    MG 2.10 12/26/2022    PHOS 3.3 12/26/2022    WXHUJQAF46EX 33.4 10/02/2023         Lab Results   Component Value Date    HGBA1C 5.8 10/02/2023        Hematology:  Lab Results   Component Value Date    WBC 10.18 10/02/2023    RBC 4.83 10/02/2023    HGB 14.4 10/02/2023    HCT 44.7 10/02/2023    MCV 92.5 10/02/2023    MCH 29.8 10/02/2023    MCHC 32.2 (L) 10/02/2023    RDW 13.0 10/02/2023     10/02/2023    MPV 9.1 10/02/2023        Lipid Panel:  Lab Results   Component Value Date    CHOL 137 10/02/2023    HDL 37 10/02/2023    LDL 64.00 10/02/2023    TRIG 179 (H) 10/02/2023    TOTALCHOLEST 4 10/02/2023        Urine:  Lab Results   Component Value Date    COLORUA Light-Yellow 05/05/2023    APPEARANCEUA Clear 05/05/2023    SGUA 1.029 05/05/2023    PHUA 6.5 05/05/2023    PROTEINUA Trace (A) 05/05/2023    GLUCOSEUA 4+ (A) 05/05/2023    KETONESUA Negative 05/05/2023    BLOODUA Negative 05/05/2023    NITRITESUA Negative 05/05/2023    LEUKOCYTESUR Negative 05/05/2023    RBCUA 0-5 05/05/2023    WBCUA 6-10 (A) 05/05/2023    BACTERIA None Seen 05/05/2023    SQEPUA Few (A) 05/05/2023    HYALINECASTS None Seen 05/05/2023    CREATRANDUR 290.2 (H) 10/02/2023    PROTEINURINE <6.8 05/12/2021          Assessment       ICD-10-CM ICD-9-CM   1. External nasal lesion  L98.9 709.9   2. History of basal cell carcinoma (BCC) of skin  Z85.828 V10.83   3. Type 2 diabetes mellitus without complication, without long-term current use of insulin  E11.9 250.00   4. BMI 28.0-28.9,adult  Z68.28 V85.24   5. Supraventricular tachycardia  I47.10 427.89   6. Heart failure with reduced ejection fraction  I50.20 428.20   7. Cigarette nicotine dependence without complication [F17.210]  F17.210 305.1        Plan     1. External nasal lesion  ENT referral  See picture of nose in media  - Ambulatory referral/consult to ENT; Future    2. History of basal cell carcinoma (BCC) of skin  ENT referral  See picture of nose in media  - Ambulatory referral/consult to ENT; Future    3. Type 2 diabetes mellitus without complication, without  long-term current use of insulin    Diabetic fundus today  ADA diet; more fruits and vegetables, lean meats, plant based sources of protein, less added sugar, less processed foods.   Medication compliance, and strict home glucose monitoring advised.  Goal A1C <7 %  Diabetic foot exam annually:  Diabetic eye exam annually:  Urine Microalbumin every 6 months:     4. BMI 28.0-28.9,adult  Goal BMI <30.  Exercise 5 times a week for 30 minutes per day.  Avoid soda, simple sugars, excessive rice, potatoes or bread. Limit fast foods and fried foods.  Choose complex carbs in moderation (example: green vegetables, beans, oatmeal). Eat plenty of fresh fruits and vegetables with lean meats daily.  Do not skip meals. Eat a balanced portion size.  Avoid fad diets. Consider permanent healthy life style changes.       5. Supraventricular tachycardia  Refills on metoprolol  RRR  - metoprolol succinate (TOPROL-XL) 25 MG 24 hr tablet; Take 1 tablet (25 mg total) by mouth 2 (two) times daily.  Dispense: 180 tablet; Refill: 1  - metoprolol succinate (TOPROL-XL) 50 MG 24 hr tablet; Take 1 tablet (50 mg total) by mouth 2 (two) times daily. ALONG WITH A 25MG TAB =75MG BID  Dispense: 180 tablet; Refill: 1    6. Heart failure with reduced ejection fraction  Refills on metoprolol  RRR  - metoprolol succinate (TOPROL-XL) 25 MG 24 hr tablet; Take 1 tablet (25 mg total) by mouth 2 (two) times daily.  Dispense: 180 tablet; Refill: 1  - metoprolol succinate (TOPROL-XL) 50 MG 24 hr tablet; Take 1 tablet (50 mg total) by mouth 2 (two) times daily. ALONG WITH A 25MG TAB =75MG BID  Dispense: 180 tablet; Refill: 1    7. Cigarette nicotine dependence without complication [F17.210]  Smoking cessation advised. Instructed on smoking cessation program through [facility] and pharmacological interventions to aid in cessation.  >5 minutes allotted to educate patient on the harms of smoking, the urgency to quit, and the development of a plan for smoking cessation.          Current Outpatient Medications   Medication Instructions    acetaminophen (TYLENOL) 500 mg, Oral, Every 6 hours PRN    albuterol (PROVENTIL) 2.5 mg, Nebulization, 4 times daily PRN    albuterol (PROVENTIL/VENTOLIN HFA) 90 mcg/actuation inhaler 1-2 puffs, Inhalation, Every 4 hours PRN    aspirin 81 mg, Oral, Daily    blood sugar diagnostic Strp To check BG 2 times daily, to use with insurance preferred meter    blood-glucose meter kit To check BG 2 times daily, to use with insurance preferred meter    diclofenac sodium (VOLTAREN) 2 g, Topical (Top), 4 times daily PRN    lancets Misc To check BG 2 times daily, to use with insurance preferred meter    metFORMIN (GLUCOPHAGE) 500 mg, Oral, Daily    metoprolol succinate (TOPROL-XL) 25 mg, Oral, 2 times daily    metoprolol succinate (TOPROL-XL) 50 mg, Oral, 2 times daily, ALONG WITH A 25MG TAB =75MG BID    nitroGLYCERIN (NITROSTAT) 0.4 mg, Sublingual, Every 5 min PRN    ONETOUCH DELICA PLUS LANCET 33 gauge Misc Topical (Top)    ONETOUCH ULTRA2 METER Misc SMARTSI Via Meter Daily    pravastatin (PRAVACHOL) 80 mg, Oral, Nightly    TRELEGY ELLIPTA 100-62.5-25 mcg DsDv 1 puff, Inhalation, Daily    triamcinolone acetonide 0.1% (KENALOG) 0.1 % cream Topical (Top), 2 times daily, Use as directed    vitamin D (VITAMIN D3) 2,000 Units, Oral, Daily       Orders Placed This Encounter   Procedures    Ambulatory referral/consult to ENT         Future Appointments   Date Time Provider Department Center   2023  9:00 AM [facility] US2 [facility] US [place]    2023  8:45 AM [facility] MAMMO2 [facility] MAMMO [place]    2023  2:00 PM Jensen Paniagua MD [facility] [place]   3/7/2024 10:15 AM Patrica Genao PA-C [facility] CARD [place]    2024  8:00 AM Harika Bryant, ANGELITO [facility] INTMED [place]    2024  9:30 AM PROVIDER, [facility] PULMONOLOGY [facility] [place]        Follow up in about 4 months (around 2024) for lab review.    Labs thoroughly reviewed  with patient. Medication refills addressed today.  RTC prn and 4 months, with labs 1 week prior to the apt.  COVID 19 precautions given to patient.  Patient voices understanding of all discharge instructions.      ANGELITO Soto

## 2023-12-12 ENCOUNTER — HOSPITAL ENCOUNTER (OUTPATIENT)
Dept: RADIOLOGY | Facility: HOSPITAL | Age: 73
Discharge: HOME | End: 2023-12-12
Attending: NURSE PRACTITIONER
Payer: MEDICARE

## 2023-12-12 DIAGNOSIS — E04.2 MULTIPLE THYROID NODULES: ICD-10-CM

## 2023-12-12 PROCEDURE — 76536 US EXAM OF HEAD AND NECK: CPT | Mod: TC

## 2023-12-12 NOTE — PROGRESS NOTES
Fadumo Lynch is a 73 y.o. female here for a diabetic eye screening with non-dilated fundus photos per ANGELITO Garcia.    Patient cooperative?: Yes  Small pupils?: No  Last eye exam: unknown    For exam results, see Encounter Report.

## 2023-12-19 ENCOUNTER — HOSPITAL ENCOUNTER (OUTPATIENT)
Dept: RADIOLOGY | Facility: HOSPITAL | Age: 73
Discharge: HOME | End: 2023-12-19
Attending: NURSE PRACTITIONER
Payer: MEDICARE

## 2023-12-19 DIAGNOSIS — Z12.31 BREAST CANCER SCREENING BY MAMMOGRAM: ICD-10-CM

## 2023-12-19 PROCEDURE — 77063 BREAST TOMOSYNTHESIS BI: CPT | Mod: 26,,, | Performed by: RADIOLOGY

## 2023-12-19 PROCEDURE — 77063 MAMMO DIGITAL SCREENING BILAT WITH TOMO: ICD-10-PCS | Mod: 26,,, | Performed by: RADIOLOGY

## 2023-12-19 PROCEDURE — 77067 MAMMO DIGITAL SCREENING BILAT WITH TOMO: ICD-10-PCS | Mod: 26,,, | Performed by: RADIOLOGY

## 2023-12-19 PROCEDURE — 77067 SCR MAMMO BI INCL CAD: CPT | Mod: 26,,, | Performed by: RADIOLOGY

## 2023-12-19 PROCEDURE — 77067 SCR MAMMO BI INCL CAD: CPT | Mod: TC

## 2023-12-27 ENCOUNTER — OFFICE VISIT (OUTPATIENT)
Dept: OTOLARYNGOLOGY | Facility: CLINIC | Age: 73
End: 2023-12-27
Payer: MEDICARE

## 2023-12-27 VITALS
TEMPERATURE: 99 F | BODY MASS INDEX: 28.34 KG/M2 | RESPIRATION RATE: 18 BRPM | DIASTOLIC BLOOD PRESSURE: 67 MMHG | HEART RATE: 65 BPM | WEIGHT: 154 LBS | HEIGHT: 62 IN | SYSTOLIC BLOOD PRESSURE: 128 MMHG

## 2023-12-27 DIAGNOSIS — L98.9 EXTERNAL NASAL LESION: ICD-10-CM

## 2023-12-27 DIAGNOSIS — Z85.828 HISTORY OF BASAL CELL CARCINOMA (BCC) OF SKIN: ICD-10-CM

## 2023-12-27 PROCEDURE — 11105 PUNCH BX SKIN EA SEP/ADDL: CPT | Mod: PBBFAC | Performed by: STUDENT IN AN ORGANIZED HEALTH CARE EDUCATION/TRAINING PROGRAM

## 2023-12-27 PROCEDURE — 88305 TISSUE EXAM BY PATHOLOGIST: CPT | Mod: TC,59

## 2023-12-27 PROCEDURE — 11104 PUNCH BX SKIN SINGLE LESION: CPT | Mod: PBBFAC | Performed by: STUDENT IN AN ORGANIZED HEALTH CARE EDUCATION/TRAINING PROGRAM

## 2023-12-27 PROCEDURE — 99215 OFFICE O/P EST HI 40 MIN: CPT | Mod: PBBFAC

## 2023-12-27 NOTE — PROGRESS NOTES
[facility]  Otolaryngology Clinic Note    Fadumo Lynch  Encounter Date: 12/27/2023  YOB: 1950  Physician: Alison Nichols MD    Chief Complaint: nasal lesion    HPI: Fadumo Lynch is a 73 y.o. female with remote history of basal cell carcinoma of the nose and benign multinodular thyroid presents to the ENT clinic reporting a new lesion on her nasal bridge, which has been present for about 10 months. The area itches occasionally.     ROS:   General: Negative except per HPI  Skin: Denies rash, ulcer, or lesion.  Eyes: Denies vision changes or diplopia.  Ears: Negative except per HPI  Nose: Negative except per HPI  Throat/mouth: Negative except per HPI  Cardiovascular: Negative except per HPI  Respiratory: Negative except per HPI  Neck: Negative except per HPI  Endocrine: Negative except per HPI  Neurologic: Negative except per HPI    Other 10-point review of systems negative except per HPI      Review of patient's allergies indicates:   Allergen Reactions    Medrol [methylprednisolone] Itching    Naproxen Hives    Zinc sulfate      Other reaction(s): LATE EFFECT OF BURNS OF OTHER SPECIFIED SITES       Past Medical History:   Diagnosis Date    CKD (chronic kidney disease)     COPD (chronic obstructive pulmonary disease)     Diabetes mellitus     Hyperlipidemia     Hypertension     Supraventricular tachycardia        Past Surgical History:   Procedure Laterality Date    TONSILLECTOMY AND ADENOIDECTOMY  1964    VAGINAL HYSTERECTOMY  1986       Social History     Socioeconomic History    Marital status:     Number of children: 2   Occupational History    Occupation: Retired   Tobacco Use    Smoking status: Every Day     Current packs/day: 0.50     Average packs/day: 0.5 packs/day for 57.0 years (28.5 ttl pk-yrs)     Types: Cigarettes     Start date: 1/1/1967     Passive exposure: Never    Smokeless tobacco: Never    Tobacco comments:     Offered cessation; pt refused.    Substance and Sexual Activity    Alcohol use: Never    Drug use: Never     Social Determinants of Health     Financial Resource Strain: High Risk (12/11/2023)    Overall Financial Resource Strain (CARDIA)     Difficulty of Paying Living Expenses: Hard   Food Insecurity: No Food Insecurity (12/11/2023)    Hunger Vital Sign     Worried About Running Out of Food in the Last Year: Never true     Ran Out of Food in the Last Year: Never true   Transportation Needs: No Transportation Needs (12/11/2023)    PRAPARE - Transportation     Lack of Transportation (Medical): No     Lack of Transportation (Non-Medical): No   Physical Activity: Inactive (12/11/2023)    Exercise Vital Sign     Days of Exercise per Week: 0 days     Minutes of Exercise per Session: 0 min   Stress: No Stress Concern Present (12/11/2023)    [employer] [employer] - Occupational Stress Questionnaire     Feeling of Stress : Only a little   Social Connections: Unknown (12/11/2023)    Social Connection and Isolation Panel [NHANES]     Attends Quaker Services: Never     Active Member of Clubs or Organizations: No     Attends Club or Organization Meetings: Never     Marital Status: Living with partner   Housing Stability: Low Risk  (12/11/2023)    Housing Stability Vital Sign     Unable to Pay for Housing in the Last Year: No     Number of Places Lived in the Last Year: 1     Unstable Housing in the Last Year: No       Family History   Problem Relation Age of Onset    Colon cancer Mother     Cancer Father        Outpatient Encounter Medications as of 12/27/2023   Medication Sig Dispense Refill    acetaminophen (TYLENOL) 500 MG tablet Take 500 mg by mouth every 6 (six) hours as needed for Pain.      albuterol (PROVENTIL/VENTOLIN HFA) 90 mcg/actuation inhaler Inhale 1-2 puffs into the lungs every 4 (four) hours as needed for Wheezing or Shortness of Breath. 18 g 4    aspirin 81 MG Chew Take 81 mg by mouth once daily.      metFORMIN  (GLUCOPHAGE) 500 MG tablet Take 1 tablet (500 mg total) by mouth once daily. 90 tablet 1    metoprolol succinate (TOPROL-XL) 25 MG 24 hr tablet Take 1 tablet (25 mg total) by mouth 2 (two) times daily. 180 tablet 1    metoprolol succinate (TOPROL-XL) 50 MG 24 hr tablet Take 1 tablet (50 mg total) by mouth 2 (two) times daily. ALONG WITH A 25MG TAB =75MG  tablet 1    ONETOUCH DELICA PLUS LANCET 33 gauge Misc Apply topically.      ONETOUCH ULTRA2 METER Misc SMARTSI Via Meter Daily      pravastatin (PRAVACHOL) 80 MG tablet Take 1 tablet (80 mg total) by mouth every evening. 90 tablet 3    TRELEGY ELLIPTA 100-62.5-25 mcg DsDv Inhale 1 puff into the lungs once daily. 1 each 6    vitamin D (VITAMIN D3) 1000 units Tab Take 2,000 Units by mouth once daily.      albuterol (PROVENTIL) 2.5 mg /3 mL (0.083 %) nebulizer solution Take 3 mLs (2.5 mg total) by nebulization 4 (four) times daily as needed for Wheezing or Shortness of Breath. 120 each 6    blood sugar diagnostic Strp To check BG 2 times daily, to use with insurance preferred meter (Patient not taking: Reported on 2023) 100 each 0    blood-glucose meter kit To check BG 2 times daily, to use with insurance preferred meter (Patient not taking: Reported on 2023) 1 each 0    diclofenac sodium (VOLTAREN) 1 % Gel Apply 2 g topically 4 (four) times daily as needed (hand pain). (Patient not taking: Reported on 10/10/2023) 100 g 2    lancets Misc To check BG 2 times daily, to use with insurance preferred meter (Patient not taking: Reported on 10/10/2023) 100 each 0    nitroGLYCERIN (NITROSTAT) 0.4 MG SL tablet Place 1 tablet (0.4 mg total) under the tongue every 5 (five) minutes as needed for Chest pain. (Patient not taking: Reported on 2023) 25 tablet 3    triamcinolone acetonide 0.1% (KENALOG) 0.1 % cream Apply topically 2 (two) times daily. Use as directed (Patient not taking: Reported on 10/10/2023) 30 g 1     No facility-administered encounter  "medications on file as of 12/27/2023.       Physical Exam:  Vitals:    12/27/23 1002   BP: 128/67   BP Location: Left arm   Patient Position: Sitting   BP Method: Medium (Automatic)   Pulse: 65   Resp: 18   Temp: 98.7 °F (37.1 °C)   Weight: 69.9 kg (154 lb)   Height: 5' 2" (1.575 m)       Constitutional  General Appearance: well nourished, well-developed, AAO x3, NAD  HEENT  Eyes: PEERLA, EOMI, normal conjunctivae  Ears: Hearing well at conversation level;   Nose: evidence of scar tissue from prior skin biopsies and reconstruction. There is a small nodular area on the bridge of the nose (see image below) with overlying erythema, measuring about 5mm in diameter  OC/OP: dentition moderate, no oral lesions, tongue/FOM/BOT- soft, no leukoplakia/ulcerations/ tenderness, tonsils 2+  Neck: soft, non-tender, no palpable lymph nodes   Thyroid region-multiple nodules, non-tender  Neuro: CN II - XII intact bilaterally  Cardiovascular: peripheral pulses palpable  Respiratory: non-labored respirations  Psychiatric: oriented to time, place and person, no depression, anxiety or agitation          PROCEDURE-Punch biopsy    Biopsy of lesion    Date/Time: 12/27/2023 10:00 AM    Performed by: Judy Coronado MD  Authorized by: Frandy Ashton MD    Consent Done?:  Yes (Written)  Local anesthesia used?: Yes    Anesthesia:  Local infiltration  Local anesthetic:  Lidocaine 1% with epinephrine  Anesthetic total (ml):  1.5  : skin lesion, history of basal cell carcinoma.  Body area:  Face/facial  Face area:  Nose  Laterality:  Right  Anesthesia:  Local infiltration  Local anesthetic:  Lidocaine 1% with epinephrine  Excision type:  Skin  Specimens?: Yes     Specimens submitted to pathology.   Hemostasis was obtained. (with cauterization)  Estimated blood loss (cc):  1  Sterile dressings:  Steri-strips   Patient tolerated the procedure well with no immediate complications.   Post-operative instructions were provided for the patient.   " Patient was discharged and will follow up for wound check and pathology results.  Comments:      Two punch biopsies performed, one with a 4mm circular blade on the lateral right nose, obtained 2 specimens. Another biopsy adjacent to the previous with a 2mm circular blade, obtained 1 specimen.     Specimen Bottle A is the lateral biopsy with 2 specimens in it  Specimen Bottle B is the medial biopsy with 1 specimen in it          Pertinent Data:  ? LABS:n/a  ? AUDIO: n/a  ? CT Scan:n/a    Imaging:   I personally reviewed the following images:n/a    Summary of Outside Records:      Assessment/Plan:  Fadumo Lynch is a 73 y.o. female with remote hx of basal cell carcinoma of the skin of the nose with new lesion on the nose, suspicion for recurrence    - punch biopsy performed today, pathology pending  -will contact pt after pathology resulted for further management    Specimen Bottle A is the lateral biopsy with 2 specimens in it  Specimen Bottle B is the medial biopsy with 1 specimen in it    Discussed with Dr. Ashton and ENT Resident Dr. Esteban Coronado M.D.  [facility] PGY-3

## 2023-12-29 LAB
ESTROGEN SERPL-MCNC: NORMAL PG/ML
INSULIN SERPL-ACNC: NORMAL U[IU]/ML
LAB AP CLINICAL INFORMATION: NORMAL
LAB AP GROSS DESCRIPTION: NORMAL
LAB AP REPORT FOOTNOTES: NORMAL
T3RU NFR SERPL: NORMAL %

## 2024-01-05 ENCOUNTER — OFFICE VISIT (OUTPATIENT)
Dept: OTOLARYNGOLOGY | Facility: CLINIC | Age: 74
End: 2024-01-05
Payer: MEDICARE

## 2024-01-05 VITALS
WEIGHT: 154 LBS | HEART RATE: 62 BPM | RESPIRATION RATE: 18 BRPM | HEIGHT: 62 IN | SYSTOLIC BLOOD PRESSURE: 126 MMHG | DIASTOLIC BLOOD PRESSURE: 61 MMHG | TEMPERATURE: 98 F | BODY MASS INDEX: 28.34 KG/M2

## 2024-01-05 DIAGNOSIS — Z85.828 HISTORY OF BASAL CELL CARCINOMA (BCC) OF SKIN: Primary | ICD-10-CM

## 2024-01-05 PROCEDURE — 99213 OFFICE O/P EST LOW 20 MIN: CPT | Mod: PBBFAC | Performed by: OTOLARYNGOLOGY

## 2024-01-05 NOTE — PROGRESS NOTES
[facility]  Otolaryngology Clinic Note    Fadumo Lynch  Encounter Date: 1/5/2024  YOB: 1950  Physician: Alison Nichols MD    Chief Complaint: nasal lesion    HPI: Fadumo Lynch is a 73 y.o. female with remote history of basal cell carcinoma of the nose and benign multinodular thyroid presents to the ENT clinic reporting a new lesion on her nasal bridge, which has been present for about 10 months. The area itches occasionally.      01/05/2024:  Follow-up after 2 biopsies on a previous basal cell excision site.  Biopsies came back actinic keratoses , no signs of carcinoma.      ROS:   General: Negative except per HPI  Skin: Denies rash, ulcer, or lesion.  Eyes: Denies vision changes or diplopia.  Ears: Negative except per HPI  Nose: Negative except per HPI  Throat/mouth: Negative except per HPI  Cardiovascular: Negative except per HPI  Respiratory: Negative except per HPI  Neck: Negative except per HPI  Endocrine: Negative except per HPI  Neurologic: Negative except per HPI    Other 10-point review of systems negative except per HPI      Review of patient's allergies indicates:   Allergen Reactions    Medrol [methylprednisolone] Itching    Naproxen Hives    Zinc sulfate      Other reaction(s): LATE EFFECT OF BURNS OF OTHER SPECIFIED SITES       Past Medical History:   Diagnosis Date    CKD (chronic kidney disease)     COPD (chronic obstructive pulmonary disease)     Diabetes mellitus     Hyperlipidemia     Hypertension     Supraventricular tachycardia        Past Surgical History:   Procedure Laterality Date    TONSILLECTOMY AND ADENOIDECTOMY  1964    VAGINAL HYSTERECTOMY  1986       Social History     Socioeconomic History    Marital status:     Number of children: 2   Occupational History    Occupation: Retired   Tobacco Use    Smoking status: Every Day     Current packs/day: 0.50     Average packs/day: 0.5 packs/day for 57.0 years (28.5 ttl pk-yrs)     Types:  Cigarettes     Start date: 1/1/1967     Passive exposure: Never    Smokeless tobacco: Never    Tobacco comments:     Offered cessation; pt refused.   Substance and Sexual Activity    Alcohol use: Never    Drug use: Never     Social Determinants of Health     Financial Resource Strain: High Risk (12/11/2023)    Overall Financial Resource Strain (CARDIA)     Difficulty of Paying Living Expenses: Hard   Food Insecurity: No Food Insecurity (12/11/2023)    Hunger Vital Sign     Worried About Running Out of Food in the Last Year: Never true     Ran Out of Food in the Last Year: Never true   Transportation Needs: No Transportation Needs (12/11/2023)    PRAPARE - Transportation     Lack of Transportation (Medical): No     Lack of Transportation (Non-Medical): No   Physical Activity: Inactive (12/11/2023)    Exercise Vital Sign     Days of Exercise per Week: 0 days     Minutes of Exercise per Session: 0 min   Stress: No Stress Concern Present (12/11/2023)    [employer] [place] [employer] - Occupational Stress Questionnaire     Feeling of Stress : Only a little   Social Connections: Unknown (12/11/2023)    Social Connection and Isolation Panel [NHANES]     Attends Gnosticism Services: Never     Active Member of Clubs or Organizations: No     Attends Club or Organization Meetings: Never     Marital Status: Living with partner   Housing Stability: Low Risk  (12/11/2023)    Housing Stability Vital Sign     Unable to Pay for Housing in the Last Year: No     Number of Places Lived in the Last Year: 1     Unstable Housing in the Last Year: No       Family History   Problem Relation Age of Onset    Colon cancer Mother     Cancer Father        Outpatient Encounter Medications as of 1/5/2024   Medication Sig Dispense Refill    acetaminophen (TYLENOL) 500 MG tablet Take 500 mg by mouth every 6 (six) hours as needed for Pain.      albuterol (PROVENTIL) 2.5 mg /3 mL (0.083 %) nebulizer solution Take 3 mLs (2.5 mg total) by  nebulization 4 (four) times daily as needed for Wheezing or Shortness of Breath. 120 each 6    albuterol (PROVENTIL/VENTOLIN HFA) 90 mcg/actuation inhaler Inhale 1-2 puffs into the lungs every 4 (four) hours as needed for Wheezing or Shortness of Breath. 18 g 4    aspirin 81 MG Chew Take 81 mg by mouth once daily.      blood sugar diagnostic Strp To check BG 2 times daily, to use with insurance preferred meter (Patient not taking: Reported on 2023) 100 each 0    blood-glucose meter kit To check BG 2 times daily, to use with insurance preferred meter (Patient not taking: Reported on 2023) 1 each 0    diclofenac sodium (VOLTAREN) 1 % Gel Apply 2 g topically 4 (four) times daily as needed (hand pain). (Patient not taking: Reported on 10/10/2023) 100 g 2    lancets Misc To check BG 2 times daily, to use with insurance preferred meter (Patient not taking: Reported on 10/10/2023) 100 each 0    metFORMIN (GLUCOPHAGE) 500 MG tablet Take 1 tablet (500 mg total) by mouth once daily. 90 tablet 1    metoprolol succinate (TOPROL-XL) 25 MG 24 hr tablet Take 1 tablet (25 mg total) by mouth 2 (two) times daily. 180 tablet 1    metoprolol succinate (TOPROL-XL) 50 MG 24 hr tablet Take 1 tablet (50 mg total) by mouth 2 (two) times daily. ALONG WITH A 25MG TAB =75MG  tablet 1    nitroGLYCERIN (NITROSTAT) 0.4 MG SL tablet Place 1 tablet (0.4 mg total) under the tongue every 5 (five) minutes as needed for Chest pain. (Patient not taking: Reported on 2023) 25 tablet 3    ONETOUCH DELICA PLUS LANCET 33 gauge Misc Apply topically.      ONETOUCH ULTRA2 METER Misc SMARTSI Via Meter Daily      pravastatin (PRAVACHOL) 80 MG tablet Take 1 tablet (80 mg total) by mouth every evening. 90 tablet 3    TRELEGY ELLIPTA 100-62.5-25 mcg DsDv Inhale 1 puff into the lungs once daily. 1 each 6    triamcinolone acetonide 0.1% (KENALOG) 0.1 % cream Apply topically 2 (two) times daily. Use as directed (Patient not taking: Reported  "on 10/10/2023) 30 g 1    vitamin D (VITAMIN D3) 1000 units Tab Take 2,000 Units by mouth once daily.       No facility-administered encounter medications on file as of 1/5/2024.       Physical Exam:  Vitals:    01/05/24 0841   BP: 126/61   BP Location: Left arm   Patient Position: Sitting   BP Method: Medium (Automatic)   Pulse: 62   Resp: 18   Temp: 98.1 °F (36.7 °C)   Weight: 69.9 kg (154 lb)   Height: 5' 2" (1.575 m)       Constitutional  General Appearance: well nourished, well-developed, AAO x3, NAD  HEENT  Eyes: PEERLA, EOMI, normal conjunctivae  Ears: Hearing well at conversation level;   Nose:  Biopsy sites healing well  OC/OP: dentition moderate, no oral lesions, tongue/FOM/BOT- soft, no leukoplakia/ulcerations/ tenderness, tonsils 2+  Neck: soft, non-tender, no palpable lymph nodes   Thyroid region-multiple nodules, non-tender  Neuro: CN II - XII intact bilaterally  Cardiovascular: peripheral pulses palpable  Respiratory: non-labored respirations  Psychiatric: oriented to time, place and person, no depression, anxiety or agitation          PROCEDURE-Punch biopsy    Procedures        Pertinent Data:  ? LABS:n/a  ? AUDIO: n/a  ? CT Scan:n/a    Imaging:   I personally reviewed the following images:n/a    Summary of Outside Records:      Assessment/Plan:  Fadumo Lynch is a 73 y.o. female with remote hx of basal cell carcinoma of the skin of the nose. Repeat biopsies are negative for carcinoma    Follow up 6 months   Use sunscreen   And other sun precautions  Return to clinic for any new lesions.    -    Frandy klein M.D.  [facility] PGY-3        "

## 2024-01-10 ENCOUNTER — TELEPHONE (OUTPATIENT)
Dept: INTERNAL MEDICINE | Facility: CLINIC | Age: 74
End: 2024-01-10
Payer: MEDICARE

## 2024-01-10 NOTE — TELEPHONE ENCOUNTER
----- Message from ANGELITO Soto sent at 1/6/2024  7:56 AM CST -----  Regarding: FW: Thyroid US results  Please let pt know there were no urgent concerning findings on US of thyroid, we will review in more detail at apt. thanks  ----- Message -----  From: Smita Sevilla FNP  Sent: 1/2/2024  10:21 AM CST  To: ANGELITO Soto  Subject: Thyroid US results                               Harika,    This is your patient.   ----- Message -----  From: Interface, Rad Results In  Sent: 12/13/2023   6:00 PM CST  To: ANGELITO Rios

## 2024-03-01 NOTE — PROGRESS NOTES
CHIEF COMPLAINT:   Chief Complaint   Patient presents with    Follow-up     6 mos f/u denies cardiac targets                                                  HPI:  Fadumo Lynch 74 y.o. female female with a PMH significant for HFimpEF, HTN, HLD, SVT, NIDDM, COPD, and tobacco use who presents to cardiology clinic for follow up and ongoing care.  Patient completed an Echocardiogram on 10.7.22 which revealed an EF of 55%.  See report below.  She completed an Exercise Nuclear Stress Test on Florina 15, 2021 which revealed no ischemia and no scar.  At last appointment patient stated that she was feeling well overall and continued to endorse FRASER/SOB that was stable.    Today the patient states that she is feeling well from a cardiac standpoint.  She continues to endorse FRASER/SOB that she associates with her COPD.  She states that it has been stable and actually she has been feeling better since using Trelegy.  She denies active other complaints such as chest pain, palpitations, PND, orthopnea, lightheadedness, dizziness, syncope, claudication symptoms, or peripheral edema.  She states that her palpitations have completely resolved on her current dose of Toprol 75 mg BID.  She states that she is able to complete her ADLs without any issues or ischemic symptoms.  Her FRASER/SOB from her COPD is her main limiting factor.  She states that she does notice her shortness of breath, specifically with more moderate exertional activities such as walking up and down stairs while carrying groceries.  She reports compliance with all her current medications.  She states that she does not perform much formal exercise but she does stay physically active and walks a lot throughout the day.  She continues to smoke approximately 1/2 pack of cigarettes per day and is not interested in quitting.                                                                                                                                                                                                                                                                  CARDIAC TESTING:  Results for orders placed during the hospital encounter of 10/07/22    Echo    Interpretation Summary  · Normal systolic function.  · The estimated ejection fraction is 55%.  · Normal right ventricular size with normal right ventricular systolic function.  · Normal central venous pressure (3 mmHg).  · The estimated PA systolic pressure is 12 mmHg.  · IVC is normal.    No results found for this or any previous visit.     No results found for this or any previous visit.       Patient Active Problem List   Diagnosis    Chronic obstructive pulmonary disease    Chronic kidney disease    Hyperlipidemia    Hypertension    Supraventricular tachycardia    Type 2 diabetes mellitus    Tobacco user    Tobacco use    Shortness of breath    Heart failure with reduced ejection fraction    Arthritis    Impaired glucose tolerance    Lesion of nose    Osteoporosis    Thyroid nodule    Right hand pain     Past Surgical History:   Procedure Laterality Date    TONSILLECTOMY AND ADENOIDECTOMY  1964    VAGINAL HYSTERECTOMY  1986     Social History     Socioeconomic History    Marital status:     Number of children: 2   Occupational History    Occupation: Retired   Tobacco Use    Smoking status: Every Day     Current packs/day: 0.50     Average packs/day: 0.5 packs/day for 57.2 years (28.6 ttl pk-yrs)     Types: Cigarettes     Start date: 1/1/1967     Passive exposure: Never    Smokeless tobacco: Never    Tobacco comments:     Offered cessation; pt refused.   Substance and Sexual Activity    Alcohol use: Never    Drug use: Never     Social Determinants of Health     Financial Resource Strain: High Risk (12/11/2023)    Overall Financial Resource Strain (CARDIA)     Difficulty of Paying Living Expenses: Hard   Food Insecurity: No Food Insecurity (12/11/2023)    Hunger Vital Sign     Worried About Running Out of Food  in the Last Year: Never true     Ran Out of Food in the Last Year: Never true   Transportation Needs: No Transportation Needs (12/11/2023)    PRAPARE - Transportation     Lack of Transportation (Medical): No     Lack of Transportation (Non-Medical): No   Physical Activity: Inactive (12/11/2023)    Exercise Vital Sign     Days of Exercise per Week: 0 days     Minutes of Exercise per Session: 0 min   Stress: No Stress Concern Present (12/11/2023)    [employer] - Occupational Stress Questionnaire     Feeling of Stress : Only a little   Social Connections: Unknown (12/11/2023)    Social Connection and Isolation Panel [NHANES]     Attends Gnosticist Services: Never     Active Member of Clubs or Organizations: No     Attends Club or Organization Meetings: Never     Marital Status: Living with partner   Housing Stability: Low Risk  (12/11/2023)    Housing Stability Vital Sign     Unable to Pay for Housing in the Last Year: No     Number of Places Lived in the Last Year: 1     Unstable Housing in the Last Year: No        Family History   Problem Relation Age of Onset    Colon cancer Mother     Cancer Father      Review of patient's allergies indicates:   Allergen Reactions    Medrol [methylprednisolone] Itching    Naproxen Hives    Zinc sulfate      Other reaction(s): LATE EFFECT OF BURNS OF OTHER SPECIFIED SITES         ROS:  Review of Systems   Constitutional: Negative.    HENT: Negative.     Eyes: Negative.    Respiratory:  Positive for cough, shortness of breath (2/2 COPD) and wheezing.    Cardiovascular: Negative.  Negative for chest pain, palpitations, orthopnea, claudication, leg swelling and PND.   Gastrointestinal: Negative.    Genitourinary: Negative.    Musculoskeletal: Negative.    Skin: Negative.    Neurological: Negative.    Endo/Heme/Allergies: Negative.    Psychiatric/Behavioral: Negative.                                                                                              "                                                                                     Negative except as stated in the history of present illness. See HPI for details.    PHYSICAL EXAM:  Visit Vitals  /71 (BP Location: Right arm, Patient Position: Sitting, BP Method: Medium (Automatic))   Pulse 94   Temp 98.2 °F (36.8 °C) (Oral)   Resp 18   Ht 5' 2" (1.575 m)   Wt 69.4 kg (153 lb)   SpO2 100%   BMI 27.98 kg/m²         Physical Exam  Constitutional:       Appearance: Normal appearance.   HENT:      Head: Normocephalic.      Nose: Nose normal.      Mouth/Throat:      Mouth: Mucous membranes are moist.   Eyes:      Extraocular Movements: Extraocular movements intact.   Neck:      Vascular: No carotid bruit.   Cardiovascular:      Rate and Rhythm: Normal rate and regular rhythm.      Pulses: Normal pulses.      Heart sounds: Normal heart sounds. No murmur heard.  Pulmonary:      Effort: Pulmonary effort is normal. No respiratory distress.      Breath sounds: Wheezing (Improving) present.   Abdominal:      General: There is no distension.   Musculoskeletal:         General: Normal range of motion.      Cervical back: Normal range of motion.      Right lower leg: No edema.      Left lower leg: No edema.   Skin:     General: Skin is warm.   Neurological:      General: No focal deficit present.      Mental Status: She is alert.   Psychiatric:         Mood and Affect: Mood normal.         Current Outpatient Medications   Medication Instructions    acetaminophen (TYLENOL) 500 mg, Oral, Every 6 hours PRN    albuterol (PROVENTIL) 2.5 mg, Nebulization, 4 times daily PRN    albuterol (PROVENTIL/VENTOLIN HFA) 90 mcg/actuation inhaler 1-2 puffs, Inhalation, Every 4 hours PRN    aspirin 81 mg, Oral, Daily    blood sugar diagnostic Strp To check BG 2 times daily, to use with insurance preferred meter    blood-glucose meter kit To check BG 2 times daily, to use with insurance preferred meter    diclofenac sodium (VOLTAREN) 2 g, " Topical (Top), 4 times daily PRN    lancets Misc To check BG 2 times daily, to use with insurance preferred meter    metFORMIN (GLUCOPHAGE) 500 mg, Oral, Daily    metoprolol succinate (TOPROL-XL) 50 mg, Oral, 2 times daily, ALONG WITH A 25MG TAB =75MG BID    metoprolol succinate (TOPROL-XL) 25 mg, Oral, 2 times daily    nitroGLYCERIN (NITROSTAT) 0.4 mg, Sublingual, Every 5 min PRN    ONETOUCH DELICA PLUS LANCET 33 gauge Misc Topical (Top)    ONETOUCH ULTRA2 METER Misc SMARTSI Via Meter Daily    pravastatin (PRAVACHOL) 80 mg, Oral, Nightly    TRELEGY ELLIPTA 100-62.5-25 mcg DsDv 1 puff, Inhalation, Daily    triamcinolone acetonide 0.1% (KENALOG) 0.1 % cream Topical (Top), 2 times daily, Use as directed    vitamin D (VITAMIN D3) 2,000 Units, Oral, Daily        All medications, laboratory studies, cardiac diagnostic imaging reviewed.     Lab Results   Component Value Date    LDL 64.00 10/02/2023    LDL 67.00 2023    TRIG 179 (H) 10/02/2023    TRIG 149 (H) 2023    CREATININE 0.85 10/02/2023    MG 2.10 2022    K 4.5 10/02/2023        ASSESSMENT/PLAN:  74 y.o. female with the following medical problems:     Heart Failure with Reduced Ejection Fraction   - Asymptomatic, denies any congestive symptoms  - Reports baseline SOB/FRASER that she associates with COPD, denies any other cardiac complaints (see HPI)  - HF Status: NYHA Class II, ACC/AHA Stage C  - LVEF: 55% (TTE on 10/7/2022) - previously 40% on 9/15/2020  - Current GDMT:  Metoprolol Succinate 75mg  - Given hypotension, Lisinopril was stopped at last office visit  - Patient was started on Jardiance at that time, but has since discontinued due to hypoglycemia  - Euvolemic and warm on exam  - Continue Low Na Diet   - Strict Is/Os and daily weights    Chest Pain- resolved  - Denies any recent episodes of chest pain, denies any recent SL Nitro use  - Exercise Nuclear Stress Test 6.15.21: No ischemia   - Continue Aspirin 81mg daily and SL Nitro prn CP  -  No indication for further ischemic testing at this time    History of Hypertension  - BP at goal - 119/71  - Continue Toprol XL   - Lisinopril stopped at previous office visit due to hypotension  - Counseled on low salt diet and exercise as tolerated    SVT (supraventricular tachycardia)   - Continue Toprol XL 75mg daily  - Denies any recent SVT episodes or any palpitations    DM2 (type 2 diabetes mellitus)  - Management per PCP    Hyperlipidemia   - LDL 64-at goal  - LDL goal less than 70 given diabetes mellitus  - Continue Pravastatin 80mg daily  - Counseled on low-cholesterol, heart healthy diet and exercise as tolerated    COPD  - Management per PCP  - Strongly encouraged smoking cessation    Tobacco Use  - Continues to smoke 1/2 PPD- not interested in quitting  - Counseled on smoking cessation        Follow up in cardiology clinic in 6 months or sooner if needed  Follow up with PCP as directed  We will obtain EKG at next visit

## 2024-03-07 ENCOUNTER — OFFICE VISIT (OUTPATIENT)
Dept: CARDIOLOGY | Facility: CLINIC | Age: 74
End: 2024-03-07
Payer: MEDICARE

## 2024-03-07 VITALS
RESPIRATION RATE: 18 BRPM | OXYGEN SATURATION: 100 % | DIASTOLIC BLOOD PRESSURE: 71 MMHG | SYSTOLIC BLOOD PRESSURE: 119 MMHG | TEMPERATURE: 98 F | BODY MASS INDEX: 28.16 KG/M2 | HEART RATE: 94 BPM | WEIGHT: 153 LBS | HEIGHT: 62 IN

## 2024-03-07 DIAGNOSIS — Z72.0 TOBACCO USE: ICD-10-CM

## 2024-03-07 DIAGNOSIS — E78.2 MIXED HYPERLIPIDEMIA: ICD-10-CM

## 2024-03-07 DIAGNOSIS — I10 PRIMARY HYPERTENSION: Primary | ICD-10-CM

## 2024-03-07 DIAGNOSIS — I47.10 SUPRAVENTRICULAR TACHYCARDIA: ICD-10-CM

## 2024-03-07 DIAGNOSIS — I50.20 HEART FAILURE WITH REDUCED EJECTION FRACTION: ICD-10-CM

## 2024-03-07 PROCEDURE — 99214 OFFICE O/P EST MOD 30 MIN: CPT | Mod: S$PBB,,,

## 2024-03-07 PROCEDURE — 99215 OFFICE O/P EST HI 40 MIN: CPT | Mod: PBBFAC

## 2024-03-07 RX ORDER — PRAVASTATIN SODIUM 80 MG/1
80 TABLET ORAL NIGHTLY
Qty: 90 TABLET | Refills: 3 | Status: SHIPPED | OUTPATIENT
Start: 2024-03-07 | End: 2025-03-07

## 2024-03-07 RX ORDER — METOPROLOL SUCCINATE 50 MG/1
50 TABLET, EXTENDED RELEASE ORAL 2 TIMES DAILY
Qty: 180 TABLET | Refills: 1 | Status: SHIPPED | OUTPATIENT
Start: 2024-03-07 | End: 2025-03-07

## 2024-03-07 RX ORDER — METOPROLOL SUCCINATE 25 MG/1
25 TABLET, EXTENDED RELEASE ORAL 2 TIMES DAILY
Qty: 180 TABLET | Refills: 1 | Status: SHIPPED | OUTPATIENT
Start: 2024-03-07 | End: 2025-03-07

## 2024-03-07 RX ORDER — NITROGLYCERIN 0.4 MG/1
0.4 TABLET SUBLINGUAL EVERY 5 MIN PRN
Qty: 25 TABLET | Refills: 3 | Status: SHIPPED | OUTPATIENT
Start: 2024-03-07

## 2024-04-01 ENCOUNTER — LAB VISIT (OUTPATIENT)
Dept: LAB | Facility: HOSPITAL | Age: 74
End: 2024-04-01
Attending: NURSE PRACTITIONER
Payer: MEDICARE

## 2024-04-01 DIAGNOSIS — I10 HYPERTENSION, UNSPECIFIED TYPE: ICD-10-CM

## 2024-04-01 DIAGNOSIS — E55.9 VITAMIN D DEFICIENCY: ICD-10-CM

## 2024-04-01 DIAGNOSIS — E11.9 TYPE 2 DIABETES MELLITUS WITHOUT COMPLICATION, WITHOUT LONG-TERM CURRENT USE OF INSULIN: ICD-10-CM

## 2024-04-01 DIAGNOSIS — E78.5 HYPERLIPIDEMIA, UNSPECIFIED HYPERLIPIDEMIA TYPE: ICD-10-CM

## 2024-04-01 LAB
ALBUMIN SERPL-MCNC: 3.4 G/DL (ref 3.4–4.8)
ALBUMIN/GLOB SERPL: 0.9 RATIO (ref 1.1–2)
ALP SERPL-CCNC: 102 UNIT/L (ref 40–150)
ALT SERPL-CCNC: 15 UNIT/L (ref 0–55)
AST SERPL-CCNC: 14 UNIT/L (ref 5–34)
BASOPHILS # BLD AUTO: 0.06 X10(3)/MCL
BASOPHILS NFR BLD AUTO: 0.8 %
BILIRUB SERPL-MCNC: 0.3 MG/DL
BUN SERPL-MCNC: 10.8 MG/DL (ref 9.8–20.1)
CALCIUM SERPL-MCNC: 9.5 MG/DL (ref 8.4–10.2)
CHLORIDE SERPL-SCNC: 103 MMOL/L (ref 98–107)
CHOLEST SERPL-MCNC: 132 MG/DL
CHOLEST/HDLC SERPL: 3 {RATIO} (ref 0–5)
CO2 SERPL-SCNC: 27 MMOL/L (ref 23–31)
CREAT SERPL-MCNC: 0.9 MG/DL (ref 0.55–1.02)
DEPRECATED CALCIDIOL+CALCIFEROL SERPL-MC: 28.9 NG/ML (ref 30–80)
EOSINOPHIL # BLD AUTO: 0.23 X10(3)/MCL (ref 0–0.9)
EOSINOPHIL NFR BLD AUTO: 2.9 %
ERYTHROCYTE [DISTWIDTH] IN BLOOD BY AUTOMATED COUNT: 13.2 % (ref 11.5–17)
EST. AVERAGE GLUCOSE BLD GHB EST-MCNC: 105.4 MG/DL
GFR SERPLBLD CREATININE-BSD FMLA CKD-EPI: >60 MLS/MIN/1.73/M2
GLOBULIN SER-MCNC: 4 GM/DL (ref 2.4–3.5)
GLUCOSE SERPL-MCNC: 118 MG/DL (ref 82–115)
HBA1C MFR BLD: 5.3 %
HCT VFR BLD AUTO: 39.9 % (ref 37–47)
HDLC SERPL-MCNC: 41 MG/DL (ref 35–60)
HGB BLD-MCNC: 13 G/DL (ref 12–16)
IMM GRANULOCYTES # BLD AUTO: 0.03 X10(3)/MCL (ref 0–0.04)
IMM GRANULOCYTES NFR BLD AUTO: 0.4 %
LDLC SERPL CALC-MCNC: 69 MG/DL (ref 50–140)
LYMPHOCYTES # BLD AUTO: 2.74 X10(3)/MCL (ref 0.6–4.6)
LYMPHOCYTES NFR BLD AUTO: 34.6 %
MCH RBC QN AUTO: 30.6 PG (ref 27–31)
MCHC RBC AUTO-ENTMCNC: 32.6 G/DL (ref 33–36)
MCV RBC AUTO: 93.9 FL (ref 80–94)
MONOCYTES # BLD AUTO: 0.42 X10(3)/MCL (ref 0.1–1.3)
MONOCYTES NFR BLD AUTO: 5.3 %
NEUTROPHILS # BLD AUTO: 4.43 X10(3)/MCL (ref 2.1–9.2)
NEUTROPHILS NFR BLD AUTO: 56 %
NRBC BLD AUTO-RTO: 0 %
PLATELET # BLD AUTO: 287 X10(3)/MCL (ref 130–400)
PMV BLD AUTO: 9.3 FL (ref 7.4–10.4)
POTASSIUM SERPL-SCNC: 3.9 MMOL/L (ref 3.5–5.1)
PROT SERPL-MCNC: 7.4 GM/DL (ref 5.8–7.6)
RBC # BLD AUTO: 4.25 X10(6)/MCL (ref 4.2–5.4)
SODIUM SERPL-SCNC: 139 MMOL/L (ref 136–145)
TRIGL SERPL-MCNC: 109 MG/DL (ref 37–140)
TSH SERPL-ACNC: 1.3 UIU/ML (ref 0.35–4.94)
VLDLC SERPL CALC-MCNC: 22 MG/DL
WBC # SPEC AUTO: 7.91 X10(3)/MCL (ref 4.5–11.5)

## 2024-04-01 PROCEDURE — 80061 LIPID PANEL: CPT

## 2024-04-01 PROCEDURE — 80053 COMPREHEN METABOLIC PANEL: CPT

## 2024-04-01 PROCEDURE — 82306 VITAMIN D 25 HYDROXY: CPT

## 2024-04-01 PROCEDURE — 36415 COLL VENOUS BLD VENIPUNCTURE: CPT

## 2024-04-01 PROCEDURE — 83036 HEMOGLOBIN GLYCOSYLATED A1C: CPT

## 2024-04-01 PROCEDURE — 84443 ASSAY THYROID STIM HORMONE: CPT

## 2024-04-01 PROCEDURE — 85025 COMPLETE CBC W/AUTO DIFF WBC: CPT

## 2024-04-08 DIAGNOSIS — J44.9 CHRONIC OBSTRUCTIVE PULMONARY DISEASE, UNSPECIFIED COPD TYPE: ICD-10-CM

## 2024-04-08 RX ORDER — ALBUTEROL SULFATE 90 UG/1
1-2 AEROSOL, METERED RESPIRATORY (INHALATION) EVERY 4 HOURS PRN
Qty: 18 G | Refills: 4 | Status: CANCELLED | OUTPATIENT
Start: 2024-04-08

## 2024-04-08 NOTE — TELEPHONE ENCOUNTER
----- Message from Bernie Krishnan sent at 4/8/2024  9:43 AM CDT -----  Regarding: refill  Type:  RX Refill Request    Who Called: [facility]    Refill or New Rx:refill    RX Name and Strength:albuterol (PROVENTIL/VENTOLIN HFA) 90 mcg/actuation inhaler    How is the patient currently taking it? (ex. 1XDay):    Is this a 30 day or 90 day RX:    Preferred Pharmacy with phone number:[facility] [address]    Local or Mail Order:    Ordering Provider:    Would the patient rather a call back or a response via [facility]?     Best Call Back Number:    Additional Information:

## 2024-04-08 NOTE — TELEPHONE ENCOUNTER
Pt requesting refill for albuterol (PROVENTIL/VENTOLIN HFA) 90 mcg/actuation inhaler     LOV:12/11/23    NOV:4/8/24

## 2024-04-09 ENCOUNTER — OFFICE VISIT (OUTPATIENT)
Dept: INTERNAL MEDICINE | Facility: CLINIC | Age: 74
End: 2024-04-09
Payer: MEDICARE

## 2024-04-09 VITALS
WEIGHT: 152.13 LBS | RESPIRATION RATE: 18 BRPM | BODY MASS INDEX: 27.99 KG/M2 | HEIGHT: 62 IN | HEART RATE: 74 BPM | TEMPERATURE: 99 F | OXYGEN SATURATION: 96 % | SYSTOLIC BLOOD PRESSURE: 114 MMHG | DIASTOLIC BLOOD PRESSURE: 73 MMHG

## 2024-04-09 DIAGNOSIS — J44.9 CHRONIC OBSTRUCTIVE PULMONARY DISEASE, UNSPECIFIED COPD TYPE: ICD-10-CM

## 2024-04-09 DIAGNOSIS — E55.9 VITAMIN D DEFICIENCY: ICD-10-CM

## 2024-04-09 DIAGNOSIS — E78.2 MIXED HYPERLIPIDEMIA: ICD-10-CM

## 2024-04-09 DIAGNOSIS — F17.210 CIGARETTE NICOTINE DEPENDENCE WITHOUT COMPLICATION: ICD-10-CM

## 2024-04-09 DIAGNOSIS — I47.10 SUPRAVENTRICULAR TACHYCARDIA: ICD-10-CM

## 2024-04-09 DIAGNOSIS — E78.5 HYPERLIPIDEMIA, UNSPECIFIED HYPERLIPIDEMIA TYPE: ICD-10-CM

## 2024-04-09 DIAGNOSIS — E11.9 TYPE 2 DIABETES MELLITUS WITHOUT COMPLICATION, WITHOUT LONG-TERM CURRENT USE OF INSULIN: ICD-10-CM

## 2024-04-09 PROCEDURE — 3288F FALL RISK ASSESSMENT DOCD: CPT | Mod: CPTII,,, | Performed by: NURSE PRACTITIONER

## 2024-04-09 PROCEDURE — 1159F MED LIST DOCD IN RCRD: CPT | Mod: CPTII,,, | Performed by: NURSE PRACTITIONER

## 2024-04-09 PROCEDURE — 99406 BEHAV CHNG SMOKING 3-10 MIN: CPT | Mod: S$PBB,,, | Performed by: NURSE PRACTITIONER

## 2024-04-09 PROCEDURE — 1126F AMNT PAIN NOTED NONE PRSNT: CPT | Mod: CPTII,,, | Performed by: NURSE PRACTITIONER

## 2024-04-09 PROCEDURE — 3044F HG A1C LEVEL LT 7.0%: CPT | Mod: CPTII,,, | Performed by: NURSE PRACTITIONER

## 2024-04-09 PROCEDURE — 1160F RVW MEDS BY RX/DR IN RCRD: CPT | Mod: CPTII,,, | Performed by: NURSE PRACTITIONER

## 2024-04-09 PROCEDURE — 3008F BODY MASS INDEX DOCD: CPT | Mod: CPTII,,, | Performed by: NURSE PRACTITIONER

## 2024-04-09 PROCEDURE — 3078F DIAST BP <80 MM HG: CPT | Mod: CPTII,,, | Performed by: NURSE PRACTITIONER

## 2024-04-09 PROCEDURE — 3074F SYST BP LT 130 MM HG: CPT | Mod: CPTII,,, | Performed by: NURSE PRACTITIONER

## 2024-04-09 PROCEDURE — 1101F PT FALLS ASSESS-DOCD LE1/YR: CPT | Mod: CPTII,,, | Performed by: NURSE PRACTITIONER

## 2024-04-09 PROCEDURE — 99214 OFFICE O/P EST MOD 30 MIN: CPT | Mod: S$PBB,25,, | Performed by: NURSE PRACTITIONER

## 2024-04-09 PROCEDURE — 99215 OFFICE O/P EST HI 40 MIN: CPT | Mod: PBBFAC | Performed by: NURSE PRACTITIONER

## 2024-04-09 RX ORDER — ALBUTEROL SULFATE 0.83 MG/ML
2.5 SOLUTION RESPIRATORY (INHALATION) 4 TIMES DAILY PRN
Qty: 120 EACH | Refills: 6 | Status: SHIPPED | OUTPATIENT
Start: 2024-04-09

## 2024-04-09 RX ORDER — FLUTICASONE FUROATE, UMECLIDINIUM BROMIDE AND VILANTEROL TRIFENATATE 100; 62.5; 25 UG/1; UG/1; UG/1
1 POWDER RESPIRATORY (INHALATION) DAILY
Qty: 1 EACH | Refills: 6 | Status: SHIPPED | OUTPATIENT
Start: 2024-04-09 | End: 2024-06-07 | Stop reason: SDUPTHER

## 2024-04-09 RX ORDER — METFORMIN HYDROCHLORIDE 500 MG/1
500 TABLET ORAL DAILY
Qty: 90 TABLET | Refills: 1 | Status: SHIPPED | OUTPATIENT
Start: 2024-04-09

## 2024-04-09 RX ORDER — ALBUTEROL SULFATE 90 UG/1
1-2 AEROSOL, METERED RESPIRATORY (INHALATION) EVERY 4 HOURS PRN
Qty: 18 G | Refills: 4 | Status: SHIPPED | OUTPATIENT
Start: 2024-04-09 | End: 2024-05-22

## 2024-04-09 RX ORDER — ASPIRIN 325 MG
50000 TABLET, DELAYED RELEASE (ENTERIC COATED) ORAL
Qty: 12 CAPSULE | Refills: 0 | Status: SHIPPED | OUTPATIENT
Start: 2024-04-09

## 2024-04-09 NOTE — PROGRESS NOTES
Internal Medicine Clinic  ANGELITO Soto     Patient Name: Fadumo Lynch   : 1950  MRN:[id number]     Chief Complaint     Chief Complaint   Patient presents with    Follow-up     Lab review        History of Present Illness     74 year old white female, presents in clinic for lab review. No new or acute complaints.     PMH SVT, HLD, HTN, CKD, COPD, T2DM, Osteopenia, multinodular thyroid, basal cell carcinoma, tobacco user. Pt states she is not ready to quit smoking; 10 cig/d. Pt is followed by Renal clinic for stage 3 CKD, cardiology clinic for SVT and medications, and ENT for basal cell carcinoma (nasal lesion) and thyroid nodules. Reports significant improvement in her palpitations with the metoprolol succinate 75 mg twice daily dose as per Cardio. Pulmonary clinic is following for pulmonary nodule. Denies chest pain, shortness of breath, cough, fever, headache, dizziness, weakness, abdominal pain, nausea, vomiting, diarrhea, constipation, dysuria, depression, anxiety.     Thyroid US FINDINGS:2022  Examination reveals the right hemithyroid to measure 4.4 cm x 1.9 x 1.6 cm, left earnest thyroid measures 3.7 x 1.7 x 1.5 cm isthmus measures 4.3 mm in thickness  On the right multiple anechoic/hypoechoic nodules are identified within the right lobe of the thyroid the largest measuring 1.2 x 1.4 x 0.6 cm mesh least stable to minimally enlarged as compared with the previous exam other subcentimeter nodules are identified largest measuring 6 mm x 5 mm x 3 mm.  On the left multiple subcentimeter nodules are identified a larger subcentimeter nodule measures 1.1 by 1 x 8 mm corresponding to a TI-RADS type 4 nodule follow-up is recommended if greater than 1 cm at 1 year, 2 years, 3 years and 5 years.  No other abnormalities  Impression:  Nodules as above with recommendations as above.  The subcentimeter nodules do not meet criteria for biopsy or surveillance.  No significant change since 2019       "  Mammogram 12/19/2023; BIRADS 1  DEXA 12/13/2022; Normal bone mineral density of the lumbar vertebrae. Improved osteopenia of the femurs.        Exercise nuclear stress test on Florina 15, 2021 which revealed no ischemia and no scar.   ECHO 9/2020; EF 40 %  Cologuard neg 10/19/2022  Following ENT; LOV 3/22/2021  Diabetic fundus; 1/2022 No retinopathy            Review of Systems     Review of Systems   Constitutional: Negative.    HENT: Negative.     Eyes: Negative.    Respiratory: Negative.     Cardiovascular: Negative.    Gastrointestinal: Negative.    Endocrine: Negative.    Genitourinary: Negative.    Musculoskeletal: Negative.    Integumentary:  Negative.   Allergic/Immunologic: Negative.    Neurological: Negative.    Hematological: Negative.    Psychiatric/Behavioral: Negative.     All other systems reviewed and are negative.       Physical Examination     Visit Vitals  /73 (BP Location: Left arm, Patient Position: Sitting, BP Method: Medium (Automatic))   Pulse 74   Temp 98.6 °F (37 °C) (Oral)   Resp 18   Ht 5' 2" (1.575 m)   Wt 69 kg (152 lb 1.9 oz)   SpO2 96%   BMI 27.82 kg/m²        BP Readings from Last 6 Encounters:   04/09/24 114/73   03/07/24 119/71   01/05/24 126/61   12/27/23 128/67   12/11/23 117/63   11/27/23 118/70   ]    Wt Readings from Last 6 Encounters:   04/09/24 69 kg (152 lb 1.9 oz)   03/07/24 69.4 kg (153 lb)   01/05/24 69.9 kg (154 lb)   12/27/23 69.9 kg (154 lb)   12/11/23 69.9 kg (154 lb)   11/27/23 69.4 kg (153 lb)   ]      Physical Exam  Vitals and nursing note reviewed.   Constitutional:       Appearance: Normal appearance.   HENT:      Head: Normocephalic and atraumatic.      Right Ear: Tympanic membrane, ear canal and external ear normal.      Left Ear: Tympanic membrane, ear canal and external ear normal.      Nose: Nose normal.      Mouth/Throat:      Mouth: Mucous membranes are moist.      Pharynx: Oropharynx is clear.   Eyes:      Extraocular Movements: Extraocular " movements intact.      Conjunctiva/sclera: Conjunctivae normal.      Pupils: Pupils are equal, round, and reactive to light.   Cardiovascular:      Rate and Rhythm: Normal rate and regular rhythm.      Pulses: Normal pulses.      Heart sounds: Normal heart sounds.   Pulmonary:      Effort: Pulmonary effort is normal.      Breath sounds: Normal breath sounds.   Abdominal:      General: Abdomen is flat. Bowel sounds are normal.      Palpations: Abdomen is soft.   Musculoskeletal:         General: Normal range of motion.      Cervical back: Normal range of motion and neck supple.   Skin:     General: Skin is warm and dry.      Capillary Refill: Capillary refill takes less than 2 seconds.   Neurological:      General: No focal deficit present.      Mental Status: She is alert and oriented to person, place, and time. Mental status is at baseline.   Psychiatric:         Mood and Affect: Mood normal.         Behavior: Behavior normal.         Thought Content: Thought content normal.         Judgment: Judgment normal.          Labs / Imaging     Chemistry:  Lab Results   Component Value Date     04/01/2024    K 3.9 04/01/2024    CHLORIDE 103 04/01/2024    BUN 10.8 04/01/2024    CREATININE 0.90 04/01/2024    EGFRNORACEVR >60 04/01/2024    GLUCOSE 118 (H) 04/01/2024    CALCIUM 9.5 04/01/2024    ALKPHOS 102 04/01/2024    LABPROT 7.4 04/01/2024    ALBUMIN 3.4 04/01/2024    BILIDIR 0.2 08/31/2021    IBILI 0.20 08/31/2021    AST 14 04/01/2024    ALT 15 04/01/2024    MG 2.10 12/26/2022    PHOS 3.3 12/26/2022    MBITIPDJ81AU 28.9 (L) 04/01/2024        Lab Results   Component Value Date    HGBA1C 5.3 04/01/2024        Hematology:  Lab Results   Component Value Date    WBC 7.91 04/01/2024    RBC 4.25 04/01/2024    HGB 13.0 04/01/2024    HCT 39.9 04/01/2024    MCV 93.9 04/01/2024    MCH 30.6 04/01/2024    MCHC 32.6 (L) 04/01/2024    RDW 13.2 04/01/2024     04/01/2024    MPV 9.3 04/01/2024        Lipid Panel:  Lab Results    Component Value Date    CHOL 132 04/01/2024    HDL 41 04/01/2024    LDL 69.00 04/01/2024    TRIG 109 04/01/2024    TOTALCHOLEST 3 04/01/2024        Urine:  Lab Results   Component Value Date    COLORUA Light-Yellow 05/05/2023    APPEARANCEUA Clear 05/05/2023    SGUA 1.029 05/05/2023    PHUA 6.5 05/05/2023    PROTEINUA Trace (A) 05/05/2023    GLUCOSEUA 4+ (A) 05/05/2023    KETONESUA Negative 05/05/2023    BLOODUA Negative 05/05/2023    NITRITESUA Negative 05/05/2023    LEUKOCYTESUR Negative 05/05/2023    RBCUA 0-5 05/05/2023    WBCUA 6-10 (A) 05/05/2023    BACTERIA None Seen 05/05/2023    SQEPUA Few (A) 05/05/2023    HYALINECASTS None Seen 05/05/2023    CREATRANDUR 290.2 (H) 10/02/2023    PROTEINURINE <6.8 05/12/2021          Assessment       ICD-10-CM ICD-9-CM   1. Type 2 diabetes mellitus without complication, without long-term current use of insulin  E11.9 250.00   2. Hyperlipidemia, unspecified hyperlipidemia type  E78.5 272.4   3. Cigarette nicotine dependence without complication [F17.210]  F17.210 305.1   4. Supraventricular tachycardia  I47.10 427.89   5. Chronic obstructive pulmonary disease, unspecified COPD type  J44.9 496   6. BMI 27.0-27.9,adult  Z68.27 V85.23   7. Mixed hyperlipidemia  E78.2 272.2   8. Vitamin D deficiency  E55.9 268.9        Plan     1. Type 2 diabetes mellitus without complication, without long-term current use of insulin     A1C 5.3  Meds continued and refilled.  ADA diet; more fruits and vegetables, lean meats, plant based sources of protein, less added sugar, less processed foods.   Medication compliance, and strict home glucose monitoring advised.  Goal A1C <7 %  Diabetic foot exam annually:  Diabetic eye exam annually:  Urine Microalbumin every 6 months:   - CBC Auto Differential; Future  - Comprehensive Metabolic Panel; Future  - Lipid Panel; Future  - Hemoglobin A1C; Future  - Urinalysis; Future  - Microalbumin/Creatinine Ratio, Urine; Future    2. Hyperlipidemia, unspecified  hyperlipidemia type  Lab Results   Component Value Date    LDL 69.00 04/01/2024    CHOL 132 04/01/2024    HDL 41 04/01/2024    TRIG 109 04/01/2024       Cont RX daily; refills given. Take Omega 3 daily.   Stressed importance of dietary modifications. Follow a low cholesterol, low saturated fat diet with less that 200mg of cholesterol a day.  Avoid fried foods and high saturated fats (high saturated fats less than 7% of calories).  Add Flax Seed/Fish Oil supplements to diet. Increase dietary fiber.  Regular exercise can reduce LDL and raise HDL. Stressed importance of physical activity 5 times per week for 30 minutes per day.    - CBC Auto Differential; Future  - Comprehensive Metabolic Panel; Future  - Lipid Panel; Future  - Hemoglobin A1C; Future  - Urinalysis; Future  - Microalbumin/Creatinine Ratio, Urine; Future    3. Cigarette nicotine dependence without complication [F17.210]  Smoking cessation advised. Instructed on smoking cessation program through [facility] and pharmacological interventions to aid in cessation.  >5 minutes allotted to educate patient on the harms of smoking, the urgency to quit, and the development of a plan for smoking cessation.     4. Supraventricular tachycardia  Following Cardio    5. Chronic obstructive pulmonary disease, unspecified COPD type  Last PFT- /Last CXR-   Use inhalers as prescribed (rinse mouth after use of steroid inhalers).    Use long term inhalers daily and rescue inhaler as needed.    Avoid triggers (high humidity, strong odors, chemical fumes).    Report signs of upper respiratory infection as soon as possible for early treatment.    Practice/encouraged abdominal breathing.   Eat smaller, more frequent meals.   Flu shot recommended yearly.    Smoking cessation encouraged   - TRELEGY ELLIPTA 100-62.5-25 mcg DsDv; Inhale 1 puff into the lungs once daily.  Dispense: 1 each; Refill: 6  - albuterol (PROVENTIL) 2.5 mg /3 mL (0.083 %) nebulizer solution; Take 3 mLs (2.5 mg total)  by nebulization 4 (four) times daily as needed for Wheezing or Shortness of Breath.  Dispense: 120 each; Refill: 6  - albuterol (PROVENTIL/VENTOLIN HFA) 90 mcg/actuation inhaler; Inhale 1-2 puffs into the lungs every 4 (four) hours as needed for Wheezing or Shortness of Breath.  Dispense: 18 g; Refill: 4    6. BMI 27.0-27.9,adult  Goal BMI <30.  Exercise 5 times a week for 30 minutes per day.  Avoid soda, simple sugars, excessive rice, potatoes or bread. Limit fast foods and fried foods.  Choose complex carbs in moderation (example: green vegetables, beans, oatmeal). Eat plenty of fresh fruits and vegetables with lean meats daily.  Do not skip meals. Eat a balanced portion size.  Avoid fad diets. Consider permanent healthy life style changes.       7. Mixed hyperlipidemia  Lab Results   Component Value Date    LDL 69.00 04/01/2024    CHOL 132 04/01/2024    HDL 41 04/01/2024    TRIG 109 04/01/2024       Cont RX daily; refills given. Take Omega 3 daily.   Stressed importance of dietary modifications. Follow a low cholesterol, low saturated fat diet with less that 200mg of cholesterol a day.  Avoid fried foods and high saturated fats (high saturated fats less than 7% of calories).  Add Flax Seed/Fish Oil supplements to diet. Increase dietary fiber.  Regular exercise can reduce LDL and raise HDL. Stressed importance of physical activity 5 times per week for 30 minutes per day.      8. Vitamin D deficiency  Vitamin D level is low. Will start patient on a prescription of vitamin D3 86629 IU once a week for 12 weeks. Once this prescription is completed, patient advised to purchase OTC vitamin D3 2000 IU and take this once a day thereafter or unless notified otherwise. Repeat Vitamin D level at follow up.   - Vitamin D; Future         Current Outpatient Medications   Medication Instructions    acetaminophen (TYLENOL) 500 mg, Oral, Every 6 hours PRN    albuterol (PROVENTIL) 2.5 mg, Nebulization, 4 times daily PRN     albuterol (PROVENTIL/VENTOLIN HFA) 90 mcg/actuation inhaler 1-2 puffs, Inhalation, Every 4 hours PRN    aspirin 81 mg, Oral, Daily    blood sugar diagnostic Strp To check BG 2 times daily, to use with insurance preferred meter    blood-glucose meter kit To check BG 2 times daily, to use with insurance preferred meter    cholecalciferol (vitamin D3) 50,000 Units, Oral, Every 7 days    lancets Misc To check BG 2 times daily, to use with insurance preferred meter    metFORMIN (GLUCOPHAGE) 500 mg, Oral, Daily    metoprolol succinate (TOPROL-XL) 50 mg, Oral, 2 times daily, ALONG WITH A 25MG TAB =75MG BID    metoprolol succinate (TOPROL-XL) 25 mg, Oral, 2 times daily    nitroGLYCERIN (NITROSTAT) 0.4 mg, Sublingual, Every 5 min PRN    ONETOUCH DELICA PLUS LANCET 33 gauge Misc Apply topically.    ONETOUCH ULTRA2 METER Misc SMARTSI Via Meter Daily    pravastatin (PRAVACHOL) 80 mg, Oral, Nightly    TRELEGY ELLIPTA 100-62.5-25 mcg DsDv 1 puff, Inhalation, Daily    triamcinolone acetonide 0.1% (KENALOG) 0.1 % cream Topical (Top), 2 times daily, Use as directed    vitamin D (VITAMIN D3) 2,000 Units, Oral, Daily       Orders Placed This Encounter   Procedures    CBC Auto Differential    Comprehensive Metabolic Panel    Lipid Panel    Hemoglobin A1C    Urinalysis    Vitamin D    Microalbumin/Creatinine Ratio, Urine         Future Appointments   Date Time Provider Department Center   7/10/2024  8:30 AM RESIDENT 1, [facility] OTORHINOLARYNGOLOGY [facility] ENT [facility]   2024 10:45 AM Patrica Genao PA-C [facility] CARD [place]    2024  9:30 AM PROVIDER, [facility] PULMONOLOGY [facility] PULM [place]    10/8/2024  8:00 AM Harika Bryant FNP [facility] INTMED [facility]        Follow up in about 6 months (around 10/9/2024) for lab review.    Labs thoroughly reviewed with patient. Medication refills addressed today.  RTC prn and 6 months, with labs 1 week prior to the apt.  COVID 19 precautions given to patient.  Patient voices  understanding of all discharge instructions.      Harika Bryant, ANGELITO

## 2024-04-10 ENCOUNTER — TELEPHONE (OUTPATIENT)
Dept: INTERNAL MEDICINE | Facility: CLINIC | Age: 74
End: 2024-04-10
Payer: MEDICARE

## 2024-04-10 RX ORDER — ASPIRIN 325 MG
50000 TABLET, DELAYED RELEASE (ENTERIC COATED) ORAL
Qty: 12 CAPSULE | Refills: 0 | Status: SHIPPED | OUTPATIENT
Start: 2024-04-10

## 2024-04-10 NOTE — TELEPHONE ENCOUNTER
----- Message from Tatiana Mukherjee LPN sent at 4/10/2024 11:29 AM CDT -----  It says rx failed  ----- Message -----  From: Keon Cisneros  Sent: 4/10/2024  11:23 AM CDT  To: Annette SIFUENTES Staff    Type:  RX Refill Request    Who Called: pt  Refill or New Rx:refill  RX Name and Strength:cholecalciferol, vitamin D3, 1,250 mcg (50,000 unit) capsule    Preferred Pharmacy with phone number:[facility] #[id number] AT [address]  Best Call Back Number:[phone]  Additional Information: pt stated pharmacy did not receive medication, please resend

## 2024-05-20 DIAGNOSIS — J44.9 CHRONIC OBSTRUCTIVE PULMONARY DISEASE, UNSPECIFIED COPD TYPE: ICD-10-CM

## 2024-05-21 NOTE — TELEPHONE ENCOUNTER
----- Message from Jazmyne Henri sent at 5/21/2024 10:50 AM CDT -----  Regarding: REFILL  Who Called: Fadumo Lynch    Refill or New Rx:Refill  RX Name and Strength:albuterol (PROVENTIL/VENTOLIN HFA) 90 mcg/actuation inhaler 18 g 4 4/9/2024 - No  Sig - Route: Inhale 1-2 puffs into the lungs every 4 (four) hours as needed for Wheezing or Shortness of Breath.       How is the patient currently taking it? (ex. 1XDay):    Is this a 30 day or 90 day RX:    Local or Mail Order:LOCAL    [facility] #[id number] at [address] AT    Phone: [phone]  Fax: [phone]    Ordering Provider:ANGELITO Garcia      Preferred Method of Contact: Phone Call  Patient's Preferred Phone Number on File: [phone]   Best Call Back Number, if different:  Additional Information: PT IS REQUESTING A REFILL ON HER RESCUE INHALER; PT NEEDS HER INHALER ASAP; PLEASE ADVISE. THANKS.

## 2024-05-22 ENCOUNTER — TELEPHONE (OUTPATIENT)
Dept: INTERNAL MEDICINE | Facility: CLINIC | Age: 74
End: 2024-05-22
Payer: MEDICARE

## 2024-05-22 RX ORDER — ALBUTEROL SULFATE 90 UG/1
AEROSOL, METERED RESPIRATORY (INHALATION)
Qty: 6.7 G | Refills: 2 | Status: SHIPPED | OUTPATIENT
Start: 2024-05-22

## 2024-05-22 NOTE — TELEPHONE ENCOUNTER
----- Message from Jazmyne Sam sent at 5/22/2024  2:40 PM CDT -----  Regarding: prescription  Who Called: Fadumo Lynch    Caller is requesting assistance/information from provider's office.    Symptoms (please be specific): n/a     How long has patient had these symptoms:  n/a    [facility] #[id number] at [place] [address] [facility] AT    Phone: [phone]  Fax: [phone]            Preferred Method of Contact: Phone Call  Patient's Preferred Phone Number on File: [phone]   Best Call Back Number, if different:  Additional Information: pt called to request that prescription be resubmitted to pharmacy; pt states that she has called 2 days and her prescription has still not been sent in; please advise; she states that she needs inhaler asap;prescription listed below.    albuterol (PROVENTIL/VENTOLIN HFA) 90 mcg/actuation inhaler 6.7 g 2 5/22/2024 - No  Sig: INHALE 1-2 PUFFS BY MOUTH EVERY 4 HOURS AS NEEDED FOR WHEEZING OR SHORTNESS OF BREATH  Sent to pharmacy as: albuterol (PROVENTIL/VENTOLIN HFA) 90 mcg/actuation inhaler  Class: Normal  Order: [id number]  Date/Time Signed: 5/22/2024 07:27      E-Prescribing Status: Transmission to pharmacy failed (5/22/2024  7:30 AM CDT)

## 2024-05-22 NOTE — TELEPHONE ENCOUNTER
----- Message from [facility] sent at 5/21/2024  1:47 PM CDT -----  .Type:  RX Refill Request    Who Called:  pt    Refill or New Rx: refill    RX Name and Strength:  Disp Refills Start End JEFRY  albuterol (PROVENTIL/VENTOLIN HFA) 90 mcg/actuation inhaler           How is the patient currently taking it? (ex. 1XDay): as needed    Is this a 30 day or 90 day RX: 90    Preferred Pharmacy with phone number: [phone]  [facility] #[id number] AT [address] [facility] AT      Local or Mail Order: local    Ordering Provider: Harika    Would the patient rather a call back or a response via [facility]?  Alonso    Best Call Back Number: [phone]    Additional Information:  refill

## 2024-05-22 NOTE — TELEPHONE ENCOUNTER
Verbal rx given to pharmacist as rx wont go through per PCP. Pt notified and verbalized understanding.

## 2024-06-03 DIAGNOSIS — J44.9 CHRONIC OBSTRUCTIVE PULMONARY DISEASE, UNSPECIFIED COPD TYPE: ICD-10-CM

## 2024-06-03 RX ORDER — FLUTICASONE FUROATE, UMECLIDINIUM BROMIDE AND VILANTEROL TRIFENATATE 100; 62.5; 25 UG/1; UG/1; UG/1
1 POWDER RESPIRATORY (INHALATION) DAILY
Qty: 1 EACH | Refills: 6 | OUTPATIENT
Start: 2024-06-03

## 2024-06-03 RX ORDER — METFORMIN HYDROCHLORIDE 500 MG/1
500 TABLET ORAL DAILY
Qty: 90 TABLET | Refills: 1 | OUTPATIENT
Start: 2024-06-03

## 2024-06-03 NOTE — TELEPHONE ENCOUNTER
----- Message from Tracie Hopkins sent at 6/3/2024  9:03 AM CDT -----  Regarding: refills  Who Called: Fadumo Lynch    Refill or New Rx:Refill  RX Name and Strength:TRELEGY ELLIPTA 100-62.5-25 mcg DsDv  How is the patient currently taking it? (ex. 1XDay):1x  Is this a 30 day or 90 day RX:30  Local or Mail Order:local  List of preferred pharmacies on file (remove unneeded): [unfilled]  If different Pharmacy is requested, enter Pharmacy information here including location and phone number:  [facility] #[id number] AT [address] AT   Ordering Provider:      Preferred Method of Contact: Phone Call  Patient's Preferred Phone Number on File: [phone]   Best Call Back Number, if different:  Additional Information:     Who Called: Fadumo Lynch    Refill or New Rx:Refill  RX Name and Strength:metFORMIN (GLUCOPHAGE) 500 MG tablet  How is the patient currently taking it? (ex. 1XDay):1x  Is this a 30 day or 90 day RX:90  Local or Mail Order:local  List of preferred pharmacies on file (remove unneeded): [facility]  If different Pharmacy is requested, enter Pharmacy information here including location and phone number:  [facility] #[id number] AT [place] [address] [facility] AT   Ordering Provider:      Preferred Method of Contact: Phone Call  Patient's Preferred Phone Number on File: [phone]   Best Call Back Number, if different:  Additional Information:

## 2024-06-03 NOTE — TELEPHONE ENCOUNTER
Called pharmacy and spoke to Elliott. He stated the rxs never went through. Pt requesting refills on the following medicines. Please advise.    TRELEGY ELLIPTA 100-62.5-25 mcg DsDv   metFORMIN (GLUCOPHAGE) 500 MG tablet     LOV:4/9/24    NOV: 10/8/24     no abrasions, no jaundice, no lesions, no pruritis, and no rashes.

## 2024-06-07 DIAGNOSIS — J44.9 CHRONIC OBSTRUCTIVE PULMONARY DISEASE, UNSPECIFIED COPD TYPE: ICD-10-CM

## 2024-06-07 NOTE — TELEPHONE ENCOUNTER
LOV:4/9/24    NOV: 10/8/24    Received a refill request from pharmacy stating the rx got stuck in the system when they did the switch to [facility].

## 2024-06-07 NOTE — TELEPHONE ENCOUNTER
Pt called inquiring about her medication refills. Pt stated she requested a refill since Monday. Please advise.

## 2024-06-09 RX ORDER — FLUTICASONE FUROATE, UMECLIDINIUM BROMIDE AND VILANTEROL TRIFENATATE 100; 62.5; 25 UG/1; UG/1; UG/1
1 POWDER RESPIRATORY (INHALATION) DAILY
Qty: 1 EACH | Refills: 6 | Status: SHIPPED | OUTPATIENT
Start: 2024-06-09

## 2024-08-05 RX ORDER — METFORMIN HYDROCHLORIDE 500 MG/1
500 TABLET ORAL DAILY
Qty: 90 TABLET | Refills: 1 | Status: SHIPPED | OUTPATIENT
Start: 2024-08-05

## 2024-09-05 ENCOUNTER — OFFICE VISIT (OUTPATIENT)
Dept: CARDIOLOGY | Facility: CLINIC | Age: 74
End: 2024-09-05
Payer: MEDICARE

## 2024-09-05 VITALS
TEMPERATURE: 98 F | RESPIRATION RATE: 18 BRPM | WEIGHT: 152.56 LBS | HEART RATE: 60 BPM | DIASTOLIC BLOOD PRESSURE: 60 MMHG | OXYGEN SATURATION: 97 % | SYSTOLIC BLOOD PRESSURE: 98 MMHG | BODY MASS INDEX: 28.07 KG/M2 | HEIGHT: 62 IN

## 2024-09-05 DIAGNOSIS — I10 PRIMARY HYPERTENSION: ICD-10-CM

## 2024-09-05 DIAGNOSIS — I47.10 SUPRAVENTRICULAR TACHYCARDIA: ICD-10-CM

## 2024-09-05 DIAGNOSIS — E11.9 TYPE 2 DIABETES MELLITUS WITHOUT COMPLICATION, WITHOUT LONG-TERM CURRENT USE OF INSULIN: ICD-10-CM

## 2024-09-05 DIAGNOSIS — Z72.0 TOBACCO USER: ICD-10-CM

## 2024-09-05 DIAGNOSIS — Z01.810 ENCOUNTER FOR PRE-OPERATIVE CARDIOVASCULAR CLEARANCE: ICD-10-CM

## 2024-09-05 DIAGNOSIS — E78.2 MIXED HYPERLIPIDEMIA: ICD-10-CM

## 2024-09-05 DIAGNOSIS — R06.09 DOE (DYSPNEA ON EXERTION): ICD-10-CM

## 2024-09-05 DIAGNOSIS — I50.20 HEART FAILURE WITH REDUCED EJECTION FRACTION: Primary | ICD-10-CM

## 2024-09-05 LAB
OHS QRS DURATION: 72 MS
OHS QTC CALCULATION: 432 MS

## 2024-09-05 PROCEDURE — 93005 ELECTROCARDIOGRAM TRACING: CPT

## 2024-09-05 PROCEDURE — 99215 OFFICE O/P EST HI 40 MIN: CPT | Mod: PBBFAC,25

## 2024-09-05 RX ORDER — METOPROLOL SUCCINATE 50 MG/1
50 TABLET, EXTENDED RELEASE ORAL 2 TIMES DAILY
Qty: 180 TABLET | Refills: 3 | Status: SHIPPED | OUTPATIENT
Start: 2024-09-05 | End: 2025-09-05

## 2024-09-05 RX ORDER — METOPROLOL SUCCINATE 25 MG/1
25 TABLET, EXTENDED RELEASE ORAL 2 TIMES DAILY
Qty: 180 TABLET | Refills: 3 | Status: SHIPPED | OUTPATIENT
Start: 2024-09-05 | End: 2025-09-05

## 2024-09-05 NOTE — PATIENT INSTRUCTIONS
Complete echocardiogram   Complete nuclear stress test   Follow up in cardiology clinic in 6 months or sooner if needed  Follow up with PCP as directed   Please notify clinic if any new concerns or any change in symptoms

## 2024-09-05 NOTE — PROGRESS NOTES
CHIEF COMPLAINT:   No chief complaint on file.                                                 HPI:  Fadumo Lynch 74 y.o. female female with a PMH significant for HFimpEF, HTN, HLD, SVT, NIDDM, COPD, and tobacco use who presents to cardiology clinic for follow up and ongoing care.  Patient completed an Echocardiogram on 10.7.22 which revealed an EF of 55%.  See report below.  She completed an Exercise Nuclear Stress Test on Florina 15, 2021 which revealed no ischemia and no scar.  At last appointment patient stated that she was feeling well overall and continued to endorse FRASER/SOB that was stable.    Today the patient states that she feels stable from a cardiac standpoint.  She still complains of SOB/FRASER that has not progressed since her last visit.  She has COPD and has had these symptoms for many years.  She has not had many issues with COPD since her last office visit and she continues to be very compliant with her current medication regimen.  She denies any further complaints such as chest pain, palpitations, PND, orthopnea, lightheadedness, dizziness, syncope, lower extremity edema, or claudication symptoms.  She has not had no recent episodes of SVT since taking Toprol 75 mg BID.  She was able to complete her ADLs without any issues or ischemic symptoms, however SOB/FRASER does limit the amount of physical activity that she can do.  She states that she has interference with FRASER when doing mild-to-moderate levels of activity.  She states that she likely is able to complete 4 Mets, however she does them slowly and requires frequent breaks.  She reports compliance with all her current medications and is tolerating them well.  She states that she walks a lot throughout the day but denies formal exercise.  She continues to smoke approximately 1/2 pack of cigarettes per day and is not interested in quitting at this time.    She states that she will be having upcoming cataract surgery in October.                                                                                                                                                                                                                                                                 CARDIAC TESTING:  Results for orders placed during the hospital encounter of 10/07/22    Echo    Interpretation Summary  · Normal systolic function.  · The estimated ejection fraction is 55%.  · Normal right ventricular size with normal right ventricular systolic function.  · Normal central venous pressure (3 mmHg).  · The estimated PA systolic pressure is 12 mmHg.  · IVC is normal.    No results found for this or any previous visit.     No results found for this or any previous visit.       Patient Active Problem List   Diagnosis    Chronic obstructive pulmonary disease    Chronic kidney disease    Hyperlipidemia    Hypertension    Supraventricular tachycardia    Type 2 diabetes mellitus    Tobacco user    Tobacco use    Shortness of breath    Heart failure with reduced ejection fraction    Arthritis    Impaired glucose tolerance    Lesion of nose    Osteoporosis    Thyroid nodule    Right hand pain     Past Surgical History:   Procedure Laterality Date    TONSILLECTOMY AND ADENOIDECTOMY  1964    VAGINAL HYSTERECTOMY  1986     Social History     Socioeconomic History    Marital status:     Number of children: 2   Occupational History    Occupation: Retired   Tobacco Use    Smoking status: Every Day     Current packs/day: 0.50     Average packs/day: 0.5 packs/day for 57.7 years (28.8 ttl pk-yrs)     Types: Cigarettes     Start date: 1/1/1967     Passive exposure: Never    Smokeless tobacco: Never    Tobacco comments:     Offered cessation; pt refused.   Substance and Sexual Activity    Alcohol use: Never    Drug use: Never     Social Determinants of Health     Financial Resource Strain: High Risk (12/11/2023)    Overall Financial Resource Strain (CARDIA)     Difficulty of Paying  Living Expenses: Hard   Food Insecurity: No Food Insecurity (12/11/2023)    Hunger Vital Sign     Worried About Running Out of Food in the Last Year: Never true     Ran Out of Food in the Last Year: Never true   Transportation Needs: No Transportation Needs (12/11/2023)    PRAPARE - Transportation     Lack of Transportation (Medical): No     Lack of Transportation (Non-Medical): No   Physical Activity: Inactive (12/11/2023)    Exercise Vital Sign     Days of Exercise per Week: 0 days     Minutes of Exercise per Session: 0 min   Stress: No Stress Concern Present (12/11/2023)    [employer] - Occupational Stress Questionnaire     Feeling of Stress : Only a little   Housing Stability: Low Risk  (12/11/2023)    Housing Stability Vital Sign     Unable to Pay for Housing in the Last Year: No     Number of Places Lived in the Last Year: 1     Unstable Housing in the Last Year: No        Family History   Problem Relation Name Age of Onset    Colon cancer Mother      Cancer Father       Review of patient's allergies indicates:   Allergen Reactions    Medrol [methylprednisolone] Itching    Naproxen Hives    Zinc sulfate      Other reaction(s): LATE EFFECT OF BURNS OF OTHER SPECIFIED SITES         ROS:  Review of Systems   Constitutional: Negative.    HENT: Negative.     Eyes: Negative.    Respiratory:  Positive for cough, shortness of breath (2/2 COPD) and wheezing.    Cardiovascular: Negative.  Negative for chest pain, palpitations, orthopnea, claudication, leg swelling and PND.   Gastrointestinal: Negative.    Genitourinary: Negative.    Musculoskeletal: Negative.    Skin: Negative.    Neurological: Negative.    Endo/Heme/Allergies: Negative.    Psychiatric/Behavioral: Negative.                                                                                                                                                                                  Negative except as stated in the history of  present illness. See HPI for details.    PHYSICAL EXAM:  There were no vitals taken for this visit.        Physical Exam  Constitutional:       Appearance: Normal appearance.   HENT:      Head: Normocephalic.      Nose: Nose normal.      Mouth/Throat:      Mouth: Mucous membranes are moist.   Eyes:      Extraocular Movements: Extraocular movements intact.   Neck:      Vascular: No carotid bruit.   Cardiovascular:      Rate and Rhythm: Normal rate and regular rhythm.      Pulses: Normal pulses.      Heart sounds: Normal heart sounds. No murmur heard.  Pulmonary:      Effort: Pulmonary effort is normal. No respiratory distress.      Breath sounds: Wheezing (Improving) present.   Abdominal:      General: There is no distension.   Musculoskeletal:         General: Normal range of motion.      Cervical back: Normal range of motion.      Right lower leg: No edema.      Left lower leg: No edema.   Skin:     General: Skin is warm.   Neurological:      General: No focal deficit present.      Mental Status: She is alert.   Psychiatric:         Mood and Affect: Mood normal.       Current Outpatient Medications   Medication Instructions    acetaminophen (TYLENOL) 500 mg, Oral, Every 6 hours PRN    albuterol (PROVENTIL) 2.5 mg, Nebulization, 4 times daily PRN    albuterol (PROVENTIL/VENTOLIN HFA) 90 mcg/actuation inhaler INHALE 1-2 PUFFS BY MOUTH EVERY 4 HOURS AS NEEDED FOR WHEEZING OR SHORTNESS OF BREATH    aspirin 81 mg, Oral, Daily    blood sugar diagnostic Strp To check BG 2 times daily, to use with insurance preferred meter    blood-glucose meter kit To check BG 2 times daily, to use with insurance preferred meter    cholecalciferol (vitamin D3) 50,000 Units, Oral, Every 7 days    cholecalciferol (vitamin D3) 50,000 Units, Oral, Every 7 days    lancets Misc To check BG 2 times daily, to use with insurance preferred meter    metFORMIN (GLUCOPHAGE) 500 mg, Oral, Daily    metoprolol succinate (TOPROL-XL) 50 mg, Oral, 2 times  daily, ALONG WITH A 25MG TAB =75MG BID    metoprolol succinate (TOPROL-XL) 25 mg, Oral, 2 times daily    nitroGLYCERIN (NITROSTAT) 0.4 mg, Sublingual, Every 5 min PRN    ONETOUCH DELICA PLUS LANCET 33 gauge Misc Apply topically.    ONETOUCH ULTRA2 METER Misc SMARTSI Via Meter Daily    pravastatin (PRAVACHOL) 80 mg, Oral, Nightly    TRELEGY ELLIPTA 100-62.5-25 mcg DsDv 1 puff, Inhalation, Daily    triamcinolone acetonide 0.1% (KENALOG) 0.1 % cream Topical (Top), 2 times daily, Use as directed    vitamin D (VITAMIN D3) 2,000 Units, Oral, Daily        All medications, laboratory studies, cardiac diagnostic imaging reviewed.     Lab Results   Component Value Date    LDL 69.00 2024    LDL 64.00 10/02/2023    TRIG 109 2024    TRIG 179 (H) 10/02/2023    CREATININE 0.90 2024    MG 2.10 2022    K 3.9 2024        ASSESSMENT/PLAN:  74 y.o. female with the following medical problems:     Heart Failure with Reduced Ejection Fraction   - Asymptomatic, denies any congestive symptoms  - Reports baseline SOB/FRASER that she associates with COPD, denies any other cardiac complaints (see HPI)  - HF Status: NYHA Class II, ACC/AHA Stage C  - LVEF: 55% (TTE on 10/7/2022) - previously 40% on 9/15/2020  - Current GDMT:  Metoprolol Succinate 75mg  - Given hypotension, Lisinopril was stopped at last office visit  - Patient was started on Jardiance at that time, but has since discontinued due to hypoglycemia  - Euvolemic and warm on exam  - Continue Low Na Diet   - Strict Is/Os and daily weights  - Will repeat Echo given need for cardiac risk assessment and ongoing SOB/FRASER.    Chest Pain- resolved  - Denies any recent episodes of chest pain, denies any recent SL Nitro use  - Exercise Nuclear Stress Test 6.15.21: No ischemia   - Continue Aspirin 81mg daily and SL Nitro prn CP  - No ischemic testing within the last 3 years.  Patient was requesting cardiac risk assessment and is unable to complete 4 METS.  She will  need stress test   We will proceed with an exercise nuclear stress test to assess for any underlying ischemic causes her SOB/FRASER.  She is able to walk on treadmill, that will proceed with nuclear testing in case patient is unable to walk secondary to SOB/FRASER, regadenoson will be used  - EKG today with NSR    History of Hypertension  - BP at goal - 98/60  - Continue Toprol XL   - Counseled on low salt diet and exercise as tolerated    SVT (supraventricular tachycardia)   - Continue Toprol XL 75mg daily  - Denies any recent SVT episodes or any palpitations  - Stable for now    DM2 (type 2 diabetes mellitus)  - Management per PCP    Hyperlipidemia   - LDL 69-at goal  - LDL goal less than 70 given diabetes mellitus  - Continue Pravastatin 80mg daily  - Counseled on low-cholesterol, heart healthy diet and exercise as tolerated    COPD  - Management per PCP  - Strongly encouraged smoking cessation    Tobacco Use  - Continues to smoke 1/2 PPD- not interested in quitting  - Counseled on smoking cessation        Complete echocardiogram   Complete nuclear stress test   Follow up in cardiology clinic in 6 months or sooner if needed  Follow up with PCP as directed   Please notify clinic if any new concerns or any change in symptoms

## 2024-09-09 ENCOUNTER — HOSPITAL ENCOUNTER (OUTPATIENT)
Dept: CARDIOLOGY | Facility: HOSPITAL | Age: 74
Discharge: HOME | End: 2024-09-09
Payer: MEDICARE

## 2024-09-09 DIAGNOSIS — I50.20 HEART FAILURE WITH REDUCED EJECTION FRACTION: ICD-10-CM

## 2024-09-09 DIAGNOSIS — Z01.810 ENCOUNTER FOR PRE-OPERATIVE CARDIOVASCULAR CLEARANCE: ICD-10-CM

## 2024-09-09 DIAGNOSIS — R06.09 DOE (DYSPNEA ON EXERTION): ICD-10-CM

## 2024-09-09 LAB
APICAL FOUR CHAMBER EJECTION FRACTION: 58 %
APICAL TWO CHAMBER EJECTION FRACTION: 62 %
AV INDEX (PROSTH): 0.68
AV MEAN GRADIENT: 3 MMHG
AV PEAK GRADIENT: 6 MMHG
AV VALVE AREA BY VELOCITY RATIO: 1.93 CM²
AV VALVE AREA: 1.92 CM²
AV VELOCITY RATIO: 0.68
CV ECHO LV RWT: 0.48 CM
DOP CALC AO PEAK VEL: 1.25 M/S
DOP CALC AO VTI: 28.7 CM
DOP CALC LVOT AREA: 2.8 CM2
DOP CALC LVOT DIAMETER: 1.9 CM
DOP CALC LVOT PEAK VEL: 0.85 M/S
DOP CALC LVOT STROKE VOLUME: 54.98 CM3
DOP CALC MV VTI: 28.4 CM
DOP CALCLVOT PEAK VEL VTI: 19.4 CM
E WAVE DECELERATION TIME: 230 MSEC
E/A RATIO: 1.01
E/E' RATIO: 9.33 M/S
ECHO LV POSTERIOR WALL: 1 CM (ref 0.6–1.1)
FRACTIONAL SHORTENING: 29 % (ref 28–44)
HR MV ECHO: 69 BPM
INTERVENTRICULAR SEPTUM: 1 CM (ref 0.6–1.1)
LEFT ATRIUM AREA SYSTOLIC (APICAL 2 CHAMBER): 15.9 CM2
LEFT ATRIUM AREA SYSTOLIC (APICAL 4 CHAMBER): 18.8 CM2
LEFT ATRIUM SIZE: 3.7 CM
LEFT ATRIUM VOLUME MOD: 46 CM3
LEFT INTERNAL DIMENSION IN SYSTOLE: 3 CM (ref 2.1–4)
LEFT VENTRICLE DIASTOLIC VOLUME: 78.6 ML
LEFT VENTRICLE END DIASTOLIC VOLUME APICAL 2 CHAMBER: 53.2 ML
LEFT VENTRICLE END DIASTOLIC VOLUME APICAL 4 CHAMBER: 60.9 ML
LEFT VENTRICLE END SYSTOLIC VOLUME APICAL 2 CHAMBER: 41.9 ML
LEFT VENTRICLE END SYSTOLIC VOLUME APICAL 4 CHAMBER: 46.3 ML
LEFT VENTRICLE SYSTOLIC VOLUME: 35 ML
LEFT VENTRICULAR INTERNAL DIMENSION IN DIASTOLE: 4.2 CM (ref 3.5–6)
LEFT VENTRICULAR MASS: 137.25 G
LV LATERAL E/E' RATIO: 9.33 M/S
LV SEPTAL E/E' RATIO: 9.33 M/S
LVED V (TEICH): 78.6 ML
LVES V (TEICH): 35 ML
LVOT MG: 1 MMHG
LVOT MV: 0.55 CM/S
MV MEAN GRADIENT: 2 MMHG
MV PEAK A VEL: 0.83 M/S
MV PEAK E VEL: 0.84 M/S
MV PEAK GRADIENT: 3 MMHG
MV STENOSIS PRESSURE HALF TIME: 76 MS
MV VALVE AREA BY CONTINUITY EQUATION: 1.94 CM2
MV VALVE AREA P 1/2 METHOD: 2.89 CM2
OHS LV EJECTION FRACTION SIMPSONS BIPLANE MOD: 57 %
PISA TR MAX VEL: 2.04 M/S
RA PRESSURE ESTIMATED: 3 MMHG
RV TB RVSP: 5 MMHG
SINUS: 2.7 CM
TDI LATERAL: 0.09 M/S
TDI SEPTAL: 0.09 M/S
TDI: 0.09 M/S
TR MAX PG: 17 MMHG
TRICUSPID ANNULAR PLANE SYSTOLIC EXCURSION: 1.74 CM
TV REST PULMONARY ARTERY PRESSURE: 20 MMHG

## 2024-09-09 PROCEDURE — 93306 TTE W/DOPPLER COMPLETE: CPT | Mod: 26,,, | Performed by: STUDENT IN AN ORGANIZED HEALTH CARE EDUCATION/TRAINING PROGRAM

## 2024-09-09 PROCEDURE — 93306 TTE W/DOPPLER COMPLETE: CPT

## 2024-09-10 ENCOUNTER — HOSPITAL ENCOUNTER (OUTPATIENT)
Dept: RADIOLOGY | Facility: HOSPITAL | Age: 74
Discharge: HOME | End: 2024-09-10
Attending: INTERNAL MEDICINE
Payer: MEDICARE

## 2024-09-10 DIAGNOSIS — Z72.0 TOBACCO ABUSE: ICD-10-CM

## 2024-09-10 DIAGNOSIS — R91.8 LUNG NODULES: ICD-10-CM

## 2024-09-10 PROCEDURE — 71250 CT THORAX DX C-: CPT | Mod: TC

## 2024-09-16 ENCOUNTER — OFFICE VISIT (OUTPATIENT)
Dept: PULMONOLOGY | Facility: CLINIC | Age: 74
End: 2024-09-16
Payer: MEDICARE

## 2024-09-16 VITALS
HEART RATE: 62 BPM | WEIGHT: 154 LBS | OXYGEN SATURATION: 96 % | HEIGHT: 62 IN | SYSTOLIC BLOOD PRESSURE: 121 MMHG | RESPIRATION RATE: 18 BRPM | TEMPERATURE: 97 F | DIASTOLIC BLOOD PRESSURE: 75 MMHG | BODY MASS INDEX: 28.34 KG/M2

## 2024-09-16 DIAGNOSIS — R91.8 MULTIPLE LUNG NODULES: ICD-10-CM

## 2024-09-16 DIAGNOSIS — R91.8 MULTIPLE LUNG NODULES: Primary | ICD-10-CM

## 2024-09-16 DIAGNOSIS — Z71.6 TOBACCO ABUSE COUNSELING: ICD-10-CM

## 2024-09-16 DIAGNOSIS — R91.1 SOLITARY PULMONARY NODULE: ICD-10-CM

## 2024-09-16 DIAGNOSIS — Z72.0 TOBACCO ABUSE: Primary | ICD-10-CM

## 2024-09-16 DIAGNOSIS — R06.09 DYSPNEA ON EXERTION: ICD-10-CM

## 2024-09-16 DIAGNOSIS — J44.9 CHRONIC OBSTRUCTIVE PULMONARY DISEASE, UNSPECIFIED COPD TYPE: ICD-10-CM

## 2024-09-16 PROCEDURE — 3074F SYST BP LT 130 MM HG: CPT | Mod: CPTII,,, | Performed by: INTERNAL MEDICINE

## 2024-09-16 PROCEDURE — 3044F HG A1C LEVEL LT 7.0%: CPT | Mod: CPTII,,, | Performed by: INTERNAL MEDICINE

## 2024-09-16 PROCEDURE — 3008F BODY MASS INDEX DOCD: CPT | Mod: CPTII,,, | Performed by: INTERNAL MEDICINE

## 2024-09-16 PROCEDURE — 1125F AMNT PAIN NOTED PAIN PRSNT: CPT | Mod: CPTII,,, | Performed by: INTERNAL MEDICINE

## 2024-09-16 PROCEDURE — 99215 OFFICE O/P EST HI 40 MIN: CPT | Mod: PBBFAC

## 2024-09-16 PROCEDURE — 1159F MED LIST DOCD IN RCRD: CPT | Mod: CPTII,,, | Performed by: INTERNAL MEDICINE

## 2024-09-16 PROCEDURE — 1101F PT FALLS ASSESS-DOCD LE1/YR: CPT | Mod: CPTII,,, | Performed by: INTERNAL MEDICINE

## 2024-09-16 PROCEDURE — 3078F DIAST BP <80 MM HG: CPT | Mod: CPTII,,, | Performed by: INTERNAL MEDICINE

## 2024-09-16 PROCEDURE — 99214 OFFICE O/P EST MOD 30 MIN: CPT | Mod: S$PBB,,, | Performed by: INTERNAL MEDICINE

## 2024-09-16 PROCEDURE — 3288F FALL RISK ASSESSMENT DOCD: CPT | Mod: CPTII,,, | Performed by: INTERNAL MEDICINE

## 2024-09-16 RX ORDER — FLUTICASONE FUROATE, UMECLIDINIUM BROMIDE AND VILANTEROL TRIFENATATE 200; 62.5; 25 UG/1; UG/1; UG/1
1 POWDER RESPIRATORY (INHALATION) DAILY
Qty: 60 EACH | Refills: 12 | Status: SHIPPED | OUTPATIENT
Start: 2024-09-16

## 2024-09-16 NOTE — PROGRESS NOTES
Subjective:     Chief Complaint: Est pt/ lung nodule      HPI: Fadumo Lynch is a 74 y.o. female who presents to clinic for follow-up of pulmonary nodule, COPD.      She endorses largely stable exercise limiting dyspnea from prior.  Compliant with Trelegy 100, with rescue albuterol use several times per day with good relief of symptoms.  Does endorse ongoing intermittent wheezing.  Unfortunately, she continues to smoke cigarettes and states she is not interested in quitting.    Repeat CT scan with further findings as documented below, but stable from prior imaging.    Has been referred for cataract surgery with cardiology clearance requested, stress testing scheduled for later this month.          Past Medical History:   Diagnosis Date    CKD (chronic kidney disease)     COPD (chronic obstructive pulmonary disease)     Diabetes mellitus     Hyperlipidemia     Hypertension     Supraventricular tachycardia        14 point ROS reviewed and negative unless documented in the history of present illness.       Social History     Socioeconomic History    Marital status:     Number of children: 2   Occupational History    Occupation: Retired   Tobacco Use    Smoking status: Every Day     Current packs/day: 0.50     Average packs/day: 0.5 packs/day for 57.7 years (28.9 ttl pk-yrs)     Types: Cigarettes     Start date: 1/1/1967     Passive exposure: Never    Smokeless tobacco: Never    Tobacco comments:     Offered cessation; pt refused.   Substance and Sexual Activity    Alcohol use: Never    Drug use: Never     Social Determinants of Health     Financial Resource Strain: High Risk (12/11/2023)    Overall Financial Resource Strain (CARDIA)     Difficulty of Paying Living Expenses: Hard   Food Insecurity: No Food Insecurity (12/11/2023)    Hunger Vital Sign     Worried About Running Out of Food in the Last Year: Never true     Ran Out of Food in the Last Year: Never true   Transportation Needs: No Transportation  Needs (2023)    PRAPARE - Transportation     Lack of Transportation (Medical): No     Lack of Transportation (Non-Medical): No   Physical Activity: Inactive (2023)    Exercise Vital Sign     Days of Exercise per Week: 0 days     Minutes of Exercise per Session: 0 min   Stress: No Stress Concern Present (2023)    [employer] [place] [employer] - Occupational Stress Questionnaire     Feeling of Stress : Only a little   Housing Stability: Low Risk  (2023)    Housing Stability Vital Sign     Unable to Pay for Housing in the Last Year: No     Number of Places Lived in the Last Year: 1     Unstable Housing in the Last Year: No         Current Outpatient Medications   Medication Instructions    acetaminophen (TYLENOL) 500 mg, Oral, Every 6 hours PRN    albuterol (PROVENTIL) 2.5 mg, Nebulization, 4 times daily PRN    albuterol (PROVENTIL/VENTOLIN HFA) 90 mcg/actuation inhaler INHALE 1-2 PUFFS BY MOUTH EVERY 4 HOURS AS NEEDED FOR WHEEZING OR SHORTNESS OF BREATH    aspirin 81 mg, Oral, Daily    blood sugar diagnostic Strp To check BG 2 times daily, to use with insurance preferred meter    blood-glucose meter kit To check BG 2 times daily, to use with insurance preferred meter    cholecalciferol (vitamin D3) 50,000 Units, Oral, Every 7 days    cholecalciferol (vitamin D3) 50,000 Units, Oral, Every 7 days    lancets Misc To check BG 2 times daily, to use with insurance preferred meter    metFORMIN (GLUCOPHAGE) 500 mg, Oral, Daily    metoprolol succinate (TOPROL-XL) 50 mg, Oral, 2 times daily, ALONG WITH A 25MG TAB =75MG BID    metoprolol succinate (TOPROL-XL) 25 mg, Oral, 2 times daily    nitroGLYCERIN (NITROSTAT) 0.4 mg, Sublingual, Every 5 min PRN    ONETOUCH DELICA PLUS LANCET 33 gauge Misc Apply topically.    ONETOUCH ULTRA2 METER Misc SMARTSI Via Meter Daily    pravastatin (PRAVACHOL) 80 mg, Oral, Nightly    TRELEGY ELLIPTA 100-62.5-25 mcg DsDv 1 puff, Inhalation, Daily    triamcinolone  "acetonide 0.1% (KENALOG) 0.1 % cream Topical (Top), 2 times daily, Use as directed    vitamin D (VITAMIN D3) 2,000 Units, Oral, Daily       Objective:   /75 (BP Location: Left arm)   Pulse 62   Temp 97.4 °F (36.3 °C) (Oral)   Resp 18   Ht 5' 2" (1.575 m)   Wt 69.9 kg (154 lb)   SpO2 96% Comment: room air  BMI 28.17 kg/m²         Gen- A/O, NAD  HENT- ATNC, MMM  CV- RRR, no murmurs  Resp- CTAB, normal work of breathing  MSK- WWP, no LE edema  Neuro- A/Ox3, no gross deficits  Psych- appropriate mood and affect        Imaging:  CT chest 09/10/2024 personally reviewed:  Two 4 mm left upper lobe pulmonary nodules (3/26), stable from 09/2023 imaging; 4 mm right middle lobe pulmonary nodule (3/56), 4 mm right middle lobe pleural-based nodule medially (3/56), 6 mm right middle lobe pleural-based nodule (3/59), all of which are stable from 09/2023 imaging; areas of scarring/atelectasis within the medial right middle lobe adjacent to these medial pleural-based nodules appears largely stable          Assessment/Plan:   Ms. Lynch is a 73yo female who presents to clinic for follow-up of pulmonary nodules, COPD.      She endorses some persistent exercise limiting dyspnea despite use of Trelegy 100 and with good symptomatic response to rescue albuterol, will increase dose to 200.  Strongly encouraged complete tobacco cessation, patient states she is currently not interested in quitting smoking.  Instructed patient to notify clinic if she determines she would like to quit and is interested in assistance with cessation.    Repeat CT scan as documented above, largely stable chronic findings.  Repeat CT ordered for 1 year.        Return to clinic in 1 year with CT chest.        Sanket Castro MD    "

## 2024-09-27 ENCOUNTER — HOSPITAL ENCOUNTER (OUTPATIENT)
Dept: RADIOLOGY | Facility: HOSPITAL | Age: 74
Discharge: HOME | End: 2024-09-27
Payer: MEDICARE

## 2024-09-27 ENCOUNTER — HOSPITAL ENCOUNTER (OUTPATIENT)
Dept: CARDIOLOGY | Facility: HOSPITAL | Age: 74
Discharge: HOME | End: 2024-09-27
Payer: MEDICARE

## 2024-09-27 VITALS
DIASTOLIC BLOOD PRESSURE: 49 MMHG | HEIGHT: 62 IN | WEIGHT: 154.13 LBS | RESPIRATION RATE: 18 BRPM | HEART RATE: 61 BPM | BODY MASS INDEX: 28.36 KG/M2 | SYSTOLIC BLOOD PRESSURE: 113 MMHG

## 2024-09-27 DIAGNOSIS — I50.20 HEART FAILURE WITH REDUCED EJECTION FRACTION: ICD-10-CM

## 2024-09-27 DIAGNOSIS — Z01.810 ENCOUNTER FOR PRE-OPERATIVE CARDIOVASCULAR CLEARANCE: ICD-10-CM

## 2024-09-27 DIAGNOSIS — R06.09 DOE (DYSPNEA ON EXERTION): ICD-10-CM

## 2024-09-27 LAB
CV STRESS BASE HR: 61 BPM
DIASTOLIC BLOOD PRESSURE: 42 MMHG
NUC REST EJECTION FRACTION: 83
NUC STRESS EJECTION FRACTION: 86 %
OHS CV CPX 85 PERCENT MAX PREDICTED HEART RATE MALE: 125
OHS CV CPX ESTIMATED METS: 5
OHS CV CPX MAX PREDICTED HEART RATE: 147
OHS CV CPX PATIENT IS FEMALE: 1
OHS CV CPX PATIENT IS MALE: 0
OHS CV CPX PEAK DIASTOLIC BLOOD PRESSURE: 70 MMHG
OHS CV CPX PEAK HEAR RATE: 98 BPM
OHS CV CPX PEAK RATE PRESSURE PRODUCT: NORMAL
OHS CV CPX PEAK SYSTOLIC BLOOD PRESSURE: 169 MMHG
OHS CV CPX PERCENT MAX PREDICTED HEART RATE ACHIEVED: 69
OHS CV CPX RATE PRESSURE PRODUCT PRESENTING: 7686
OHS CV INITIAL DOSE: 31.8 MCG/KG/MIN
OHS CV PEAK DOSE: 10.4 MCG/KG/MIN
OHS QRS DURATION: 76 MS
OHS QTC CALCULATION: 446 MS
STRESS ECHO POST EXERCISE DUR MIN: 1 MINUTES
STRESS ECHO POST EXERCISE DUR SEC: 6 SECONDS
SUMMED DIFFERENCE: 0
SUMMED REST SCORE: 0
SUMMED STRESS SCORE: 0
SYSTOLIC BLOOD PRESSURE: 126 MMHG

## 2024-09-27 PROCEDURE — 93017 CV STRESS TEST TRACING ONLY: CPT

## 2024-09-27 PROCEDURE — 63600175 PHARM REV CODE 636 W HCPCS

## 2024-09-27 PROCEDURE — 78452 HT MUSCLE IMAGE SPECT MULT: CPT

## 2024-09-27 PROCEDURE — A9502 TC99M TETROFOSMIN: HCPCS

## 2024-09-27 RX ORDER — REGADENOSON 0.08 MG/ML
0.4 INJECTION, SOLUTION INTRAVENOUS
Status: COMPLETED | OUTPATIENT
Start: 2024-09-27 | End: 2024-09-27

## 2024-09-27 RX ADMIN — REGADENOSON 0.4 MG: 0.08 INJECTION, SOLUTION INTRAVENOUS at 07:09

## 2024-09-27 RX ADMIN — TETROFOSMIN 31.8 MILLICURIE: 1.38 INJECTION, POWDER, LYOPHILIZED, FOR SOLUTION INTRAVENOUS at 08:09

## 2024-09-27 RX ADMIN — TETROFOSMIN 10.4 MILLICURIE: 1.38 INJECTION, POWDER, LYOPHILIZED, FOR SOLUTION INTRAVENOUS at 07:09

## 2024-09-27 NOTE — NURSING NOTE
Nuclear Treadmill Stress test was converted to Lexiscan at this time. Tolerated well. No side effects noted.

## 2024-10-02 ENCOUNTER — LAB VISIT (OUTPATIENT)
Dept: LAB | Facility: HOSPITAL | Age: 74
End: 2024-10-02
Attending: NURSE PRACTITIONER
Payer: MEDICARE

## 2024-10-02 DIAGNOSIS — E11.9 TYPE 2 DIABETES MELLITUS WITHOUT COMPLICATION, WITHOUT LONG-TERM CURRENT USE OF INSULIN: ICD-10-CM

## 2024-10-02 DIAGNOSIS — E78.5 HYPERLIPIDEMIA, UNSPECIFIED HYPERLIPIDEMIA TYPE: ICD-10-CM

## 2024-10-02 DIAGNOSIS — E55.9 VITAMIN D DEFICIENCY: ICD-10-CM

## 2024-10-02 LAB
25(OH)D3+25(OH)D2 SERPL-MCNC: 45 NG/ML (ref 30–80)
ALBUMIN SERPL-MCNC: 3.4 G/DL (ref 3.4–4.8)
ALBUMIN/GLOB SERPL: 0.9 RATIO (ref 1.1–2)
ALP SERPL-CCNC: 109 UNIT/L (ref 40–150)
ALT SERPL-CCNC: 16 UNIT/L (ref 0–55)
ANION GAP SERPL CALC-SCNC: 7 MEQ/L
AST SERPL-CCNC: 16 UNIT/L (ref 5–34)
BACTERIA #/AREA URNS AUTO: ABNORMAL /HPF
BASOPHILS # BLD AUTO: 0.08 X10(3)/MCL
BASOPHILS NFR BLD AUTO: 0.9 %
BILIRUB SERPL-MCNC: 0.4 MG/DL
BILIRUB UR QL STRIP.AUTO: NEGATIVE
BUN SERPL-MCNC: 8.8 MG/DL (ref 9.8–20.1)
CALCIUM SERPL-MCNC: 9.5 MG/DL (ref 8.4–10.2)
CHLORIDE SERPL-SCNC: 102 MMOL/L (ref 98–107)
CHOLEST SERPL-MCNC: 139 MG/DL
CHOLEST/HDLC SERPL: 3 {RATIO} (ref 0–5)
CLARITY UR: CLEAR
CO2 SERPL-SCNC: 29 MMOL/L (ref 23–31)
COLOR UR AUTO: YELLOW
CREAT SERPL-MCNC: 0.87 MG/DL (ref 0.55–1.02)
CREAT UR-MCNC: 238 MG/DL (ref 45–106)
CREAT/UREA NIT SERPL: 10
EOSINOPHIL # BLD AUTO: 0.25 X10(3)/MCL (ref 0–0.9)
EOSINOPHIL NFR BLD AUTO: 2.7 %
ERYTHROCYTE [DISTWIDTH] IN BLOOD BY AUTOMATED COUNT: 12.9 % (ref 11.5–17)
EST. AVERAGE GLUCOSE BLD GHB EST-MCNC: 114 MG/DL
GFR SERPLBLD CREATININE-BSD FMLA CKD-EPI: >60 ML/MIN/1.73/M2
GLOBULIN SER-MCNC: 3.9 GM/DL (ref 2.4–3.5)
GLUCOSE SERPL-MCNC: 136 MG/DL (ref 82–115)
GLUCOSE UR QL STRIP: NORMAL
HBA1C MFR BLD: 5.6 %
HCT VFR BLD AUTO: 40.3 % (ref 37–47)
HDLC SERPL-MCNC: 41 MG/DL (ref 35–60)
HGB BLD-MCNC: 13.5 G/DL (ref 12–16)
HGB UR QL STRIP: NEGATIVE
HYALINE CASTS #/AREA URNS LPF: ABNORMAL /LPF
IMM GRANULOCYTES # BLD AUTO: 0.05 X10(3)/MCL (ref 0–0.04)
IMM GRANULOCYTES NFR BLD AUTO: 0.5 %
KETONES UR QL STRIP: NEGATIVE
LDLC SERPL CALC-MCNC: 72 MG/DL (ref 50–140)
LEUKOCYTE ESTERASE UR QL STRIP: NEGATIVE
LYMPHOCYTES # BLD AUTO: 2.79 X10(3)/MCL (ref 0.6–4.6)
LYMPHOCYTES NFR BLD AUTO: 29.9 %
MCH RBC QN AUTO: 31.5 PG (ref 27–31)
MCHC RBC AUTO-ENTMCNC: 33.5 G/DL (ref 33–36)
MCV RBC AUTO: 94.2 FL (ref 80–94)
MICROALBUMIN UR-MCNC: 9.3 UG/ML
MICROALBUMIN/CREAT RATIO PNL UR: 3.9 MG/GM CR (ref 0–30)
MONOCYTES # BLD AUTO: 0.48 X10(3)/MCL (ref 0.1–1.3)
MONOCYTES NFR BLD AUTO: 5.2 %
MUCOUS THREADS URNS QL MICRO: ABNORMAL /LPF
NEUTROPHILS # BLD AUTO: 5.67 X10(3)/MCL (ref 2.1–9.2)
NEUTROPHILS NFR BLD AUTO: 60.8 %
NITRITE UR QL STRIP: NEGATIVE
NRBC BLD AUTO-RTO: 0 %
PH UR STRIP: 5 [PH]
PLATELET # BLD AUTO: 292 X10(3)/MCL (ref 130–400)
PMV BLD AUTO: 9 FL (ref 7.4–10.4)
POTASSIUM SERPL-SCNC: 4.1 MMOL/L (ref 3.5–5.1)
PROT SERPL-MCNC: 7.3 GM/DL (ref 5.8–7.6)
PROT UR QL STRIP: ABNORMAL
RBC # BLD AUTO: 4.28 X10(6)/MCL (ref 4.2–5.4)
RBC #/AREA URNS AUTO: ABNORMAL /HPF
SODIUM SERPL-SCNC: 138 MMOL/L (ref 136–145)
SP GR UR STRIP.AUTO: 1.03 (ref 1–1.03)
SQUAMOUS #/AREA URNS LPF: ABNORMAL /HPF
TRIGL SERPL-MCNC: 132 MG/DL (ref 37–140)
UROBILINOGEN UR STRIP-ACNC: ABNORMAL
VLDLC SERPL CALC-MCNC: 26 MG/DL
WBC # BLD AUTO: 9.32 X10(3)/MCL (ref 4.5–11.5)
WBC #/AREA URNS AUTO: ABNORMAL /HPF

## 2024-10-02 PROCEDURE — 82043 UR ALBUMIN QUANTITATIVE: CPT

## 2024-10-02 PROCEDURE — 83036 HEMOGLOBIN GLYCOSYLATED A1C: CPT

## 2024-10-02 PROCEDURE — 80061 LIPID PANEL: CPT

## 2024-10-02 PROCEDURE — 81001 URINALYSIS AUTO W/SCOPE: CPT

## 2024-10-02 PROCEDURE — 80053 COMPREHEN METABOLIC PANEL: CPT

## 2024-10-02 PROCEDURE — 82570 ASSAY OF URINE CREATININE: CPT

## 2024-10-02 PROCEDURE — 36415 COLL VENOUS BLD VENIPUNCTURE: CPT

## 2024-10-02 PROCEDURE — 82306 VITAMIN D 25 HYDROXY: CPT

## 2024-10-02 PROCEDURE — 85025 COMPLETE CBC W/AUTO DIFF WBC: CPT

## 2024-10-08 ENCOUNTER — OFFICE VISIT (OUTPATIENT)
Dept: INTERNAL MEDICINE | Facility: CLINIC | Age: 74
End: 2024-10-08
Payer: MEDICARE

## 2024-10-08 VITALS
OXYGEN SATURATION: 96 % | DIASTOLIC BLOOD PRESSURE: 65 MMHG | WEIGHT: 151 LBS | BODY MASS INDEX: 27.79 KG/M2 | RESPIRATION RATE: 16 BRPM | TEMPERATURE: 98 F | SYSTOLIC BLOOD PRESSURE: 124 MMHG | HEART RATE: 60 BPM | HEIGHT: 62 IN

## 2024-10-08 DIAGNOSIS — Z23 NEED FOR INFLUENZA VACCINATION: ICD-10-CM

## 2024-10-08 DIAGNOSIS — E78.2 MIXED HYPERLIPIDEMIA: ICD-10-CM

## 2024-10-08 DIAGNOSIS — F17.210 CIGARETTE NICOTINE DEPENDENCE WITHOUT COMPLICATION: ICD-10-CM

## 2024-10-08 DIAGNOSIS — J44.9 CHRONIC OBSTRUCTIVE PULMONARY DISEASE, UNSPECIFIED COPD TYPE: ICD-10-CM

## 2024-10-08 DIAGNOSIS — I47.10 SUPRAVENTRICULAR TACHYCARDIA: ICD-10-CM

## 2024-10-08 DIAGNOSIS — E11.9 TYPE 2 DIABETES MELLITUS WITHOUT COMPLICATION, WITHOUT LONG-TERM CURRENT USE OF INSULIN: ICD-10-CM

## 2024-10-08 DIAGNOSIS — Z12.31 BREAST CANCER SCREENING BY MAMMOGRAM: Primary | ICD-10-CM

## 2024-10-08 DIAGNOSIS — E55.9 VITAMIN D DEFICIENCY: ICD-10-CM

## 2024-10-08 PROCEDURE — 3008F BODY MASS INDEX DOCD: CPT | Mod: CPTII,,, | Performed by: NURSE PRACTITIONER

## 2024-10-08 PROCEDURE — 3078F DIAST BP <80 MM HG: CPT | Mod: CPTII,,, | Performed by: NURSE PRACTITIONER

## 2024-10-08 PROCEDURE — 99214 OFFICE O/P EST MOD 30 MIN: CPT | Mod: PBBFAC | Performed by: NURSE PRACTITIONER

## 2024-10-08 PROCEDURE — 3044F HG A1C LEVEL LT 7.0%: CPT | Mod: CPTII,,, | Performed by: NURSE PRACTITIONER

## 2024-10-08 PROCEDURE — 3288F FALL RISK ASSESSMENT DOCD: CPT | Mod: CPTII,,, | Performed by: NURSE PRACTITIONER

## 2024-10-08 PROCEDURE — 3066F NEPHROPATHY DOC TX: CPT | Mod: CPTII,,, | Performed by: NURSE PRACTITIONER

## 2024-10-08 PROCEDURE — 1159F MED LIST DOCD IN RCRD: CPT | Mod: CPTII,,, | Performed by: NURSE PRACTITIONER

## 2024-10-08 PROCEDURE — 3061F NEG MICROALBUMINURIA REV: CPT | Mod: CPTII,,, | Performed by: NURSE PRACTITIONER

## 2024-10-08 PROCEDURE — 1101F PT FALLS ASSESS-DOCD LE1/YR: CPT | Mod: CPTII,,, | Performed by: NURSE PRACTITIONER

## 2024-10-08 PROCEDURE — 1160F RVW MEDS BY RX/DR IN RCRD: CPT | Mod: CPTII,,, | Performed by: NURSE PRACTITIONER

## 2024-10-08 PROCEDURE — 99214 OFFICE O/P EST MOD 30 MIN: CPT | Mod: S$PBB,,, | Performed by: NURSE PRACTITIONER

## 2024-10-08 PROCEDURE — 90653 IIV ADJUVANT VACCINE IM: CPT | Mod: PBBFAC

## 2024-10-08 PROCEDURE — 1126F AMNT PAIN NOTED NONE PRSNT: CPT | Mod: CPTII,,, | Performed by: NURSE PRACTITIONER

## 2024-10-08 PROCEDURE — 3074F SYST BP LT 130 MM HG: CPT | Mod: CPTII,,, | Performed by: NURSE PRACTITIONER

## 2024-10-08 PROCEDURE — G0008 ADMIN INFLUENZA VIRUS VAC: HCPCS | Mod: PBBFAC

## 2024-10-08 RX ORDER — METFORMIN HYDROCHLORIDE 500 MG/1
500 TABLET ORAL DAILY
Qty: 90 TABLET | Refills: 1 | Status: SHIPPED | OUTPATIENT
Start: 2024-10-08

## 2024-10-08 RX ORDER — ALBUTEROL SULFATE 90 UG/1
1-2 INHALANT RESPIRATORY (INHALATION) EVERY 6 HOURS PRN
Qty: 6.7 G | Refills: 2 | Status: SHIPPED | OUTPATIENT
Start: 2024-10-08

## 2024-10-08 RX ORDER — ALBUTEROL SULFATE 0.83 MG/ML
2.5 SOLUTION RESPIRATORY (INHALATION) 4 TIMES DAILY PRN
Qty: 120 EACH | Refills: 6 | Status: SHIPPED | OUTPATIENT
Start: 2024-10-08

## 2024-10-08 RX ADMIN — INFLUENZA A VIRUS A/VICTORIA/4897/2022 IVR-238 (H1N1) ANTIGEN (FORMALDEHYDE INACTIVATED), INFLUENZA A VIRUS A/THAILAND/8/2022 IVR-237 (H3N2) ANTIGEN (FORMALDEHYDE INACTIVATED), INFLUENZA B VIRUS B/AUSTRIA/1359417/2021 BVR-26 ANTIGEN (FORMALDEHYDE INACTIVATED) 0.5 ML: 15; 15; 15 INJECTION, SUSPENSION INTRAMUSCULAR at 08:10

## 2024-10-08 NOTE — LETTER
I certify that (Name) Fadumo Lynch meets the requirements as outlined in #  3 (shown on reverse side) and qualifies for a mobility impaired license plate/hang-tag. I further understand that willful and false certification shall subject me to fines/imprisonment as outlined in R.S. 47:463.4 (G) (4). The applicant's information is as follows:    YOB: 1950            Race:White        Gender:Female    Address:  [address] [place] [address]    City:[place]                               State:[place]     Zip Code:[address]     [x]Permanently Impaired - Applicant has a total or lifelong condition of mobility impairment from which little or no improvement or recovery can reasonably be expected. A medical examiners certification is required on initial application only.      [] Temporarily Impaired - Applicant has a temporary condition of mobility impairment of which improvement or recovery can reasonably be expected. Applicant is entitled to a hangtag, which will be valid for one (1) year. A medical examiners certification is required for the renewal of the hangtag      [] Unable to appear in person at the Office of Motor Vehicles - Applicant must bring facial photo        Medical Examiner's Signature________________________________________ Date:10/8/24_________________________    Printed Name:_____Harika Bryant NP__________________________    State License ___[id number]__________________________    Address: [address] [place] [address]___                                     Phone Number: [phone]                                                                                                                                                                                                                  City: [place]__________________________________ State: [place] __Zip Code: [address] _______________    TO BE COMPLETED BY MOTOR VEHICLE ANALYST ONLY    MERLY   Lic. Plate #      Hangtag Control #   Hangtag ID #      Date Issued:     #:   Office #:

## 2024-10-08 NOTE — PROGRESS NOTES
[facility]  ANGELITO Soto     Patient Name: Fadumo Lynch   : 1950  MRN:[id number]     Chief Complaint     Chief Complaint   Patient presents with    Diarrhea     Lab review        History of Present Illness     74 year old white female, presents in clinic for lab review. No new or acute complaints.     PMH SVT, HLD, HTN, CKD, COPD, T2DM, Osteopenia, multinodular thyroid, basal cell carcinoma, tobacco user. Pt states she is not ready to quit smoking; 10 cig/d. Pt is followed by Renal clinic for stage 3 CKD, cardiology clinic for SVT and medications, and ENT for basal cell carcinoma (nasal lesion) and thyroid nodules. Reports significant improvement in her palpitations with the metoprolol succinate 75 mg twice daily dose as per Cardio. Pulmonary clinic is following for pulmonary nodule. Denies chest pain, shortness of breath, cough, fever, headache, dizziness, weakness, abdominal pain, nausea, vomiting, diarrhea, constipation, dysuria, depression, anxiety.     Thyroid US FINDINGS:2022  Examination reveals the right hemithyroid to measure 4.4 cm x 1.9 x 1.6 cm, left earnest thyroid measures 3.7 x 1.7 x 1.5 cm isthmus measures 4.3 mm in thickness  On the right multiple anechoic/hypoechoic nodules are identified within the right lobe of the thyroid the largest measuring 1.2 x 1.4 x 0.6 cm mesh least stable to minimally enlarged as compared with the previous exam other subcentimeter nodules are identified largest measuring 6 mm x 5 mm x 3 mm.  On the left multiple subcentimeter nodules are identified a larger subcentimeter nodule measures 1.1 by 1 x 8 mm corresponding to a TI-RADS type 4 nodule follow-up is recommended if greater than 1 cm at 1 year, 2 years, 3 years and 5 years.  No other abnormalities  Impression:  Nodules as above with recommendations as above.  The subcentimeter nodules do not meet criteria for biopsy or surveillance.  No significant change since 2019       "  Mammogram 12/19/2023; BIRADS 1  DEXA 12/13/2022; Normal bone mineral density of the lumbar vertebrae. Improved osteopenia of the femurs.        Exercise nuclear stress test on Florina 15, 2021 which revealed no ischemia and no scar.   ECHO 9/2020; EF 40 %  Cologuard neg 10/19/2022  Following ENT; LOV 3/22/2021  Diabetic fundus; 1/2022 No retinopathy                      Review of Systems     Review of Systems   Constitutional: Negative.    HENT: Negative.     Eyes: Negative.    Respiratory: Negative.     Cardiovascular: Negative.    Gastrointestinal: Negative.    Endocrine: Negative.    Genitourinary: Negative.    Musculoskeletal: Negative.    Integumentary:  Negative.   Allergic/Immunologic: Negative.    Neurological: Negative.    Hematological: Negative.    Psychiatric/Behavioral: Negative.     All other systems reviewed and are negative.       Physical Examination     Visit Vitals  /65 (BP Location: Right arm)   Pulse 60   Temp 98 °F (36.7 °C) (Oral)   Resp 16   Ht 5' 2" (1.575 m)   Wt 68.5 kg (151 lb)   SpO2 96%   BMI 27.62 kg/m²        BP Readings from Last 6 Encounters:   10/08/24 124/65   09/27/24 (!) 113/49   09/16/24 121/75   09/05/24 98/60   04/09/24 114/73   03/07/24 119/71   ]    Wt Readings from Last 6 Encounters:   10/08/24 68.5 kg (151 lb)   09/27/24 69.9 kg (154 lb 1.6 oz)   09/16/24 69.9 kg (154 lb)   09/05/24 69.2 kg (152 lb 8.9 oz)   04/09/24 69 kg (152 lb 1.9 oz)   03/07/24 69.4 kg (153 lb)   ]    BMI Readings from Last 3 Encounters:   10/08/24 27.62 kg/m²   09/27/24 28.19 kg/m²   09/16/24 28.17 kg/m²         Physical Exam  Vitals and nursing note reviewed.   Constitutional:       Appearance: Normal appearance.   HENT:      Head: Normocephalic and atraumatic.      Right Ear: Tympanic membrane, ear canal and external ear normal.      Left Ear: Tympanic membrane, ear canal and external ear normal.      Nose: Nose normal.      Mouth/Throat:      Mouth: Mucous membranes are moist.      Pharynx: " Oropharynx is clear.   Eyes:      Extraocular Movements: Extraocular movements intact.      Conjunctiva/sclera: Conjunctivae normal.      Pupils: Pupils are equal, round, and reactive to light.   Cardiovascular:      Rate and Rhythm: Normal rate and regular rhythm.      Pulses: Normal pulses.      Heart sounds: Normal heart sounds.   Pulmonary:      Effort: Pulmonary effort is normal.      Breath sounds: Normal breath sounds.   Abdominal:      General: Abdomen is flat. Bowel sounds are normal.      Palpations: Abdomen is soft.   Musculoskeletal:         General: Normal range of motion.      Cervical back: Normal range of motion and neck supple.   Skin:     General: Skin is warm and dry.      Capillary Refill: Capillary refill takes less than 2 seconds.   Neurological:      General: No focal deficit present.      Mental Status: She is alert and oriented to person, place, and time. Mental status is at baseline.   Psychiatric:         Mood and Affect: Mood normal.         Behavior: Behavior normal.         Thought Content: Thought content normal.         Judgment: Judgment normal.          Labs / Imaging     Chemistry:  Lab Results   Component Value Date     10/02/2024    K 4.1 10/02/2024    BUN 8.8 (L) 10/02/2024    CREATININE 0.87 10/02/2024    EGFRNORACEVR >60 10/02/2024    GLUCOSE 136 (H) 10/02/2024    CALCIUM 9.5 10/02/2024    ALKPHOS 109 10/02/2024    LABPROT 7.3 10/02/2024    ALBUMIN 3.4 10/02/2024    BILIDIR 0.2 08/31/2021    IBILI 0.20 08/31/2021    AST 16 10/02/2024    ALT 16 10/02/2024    MG 2.10 12/26/2022    PHOS 3.3 12/26/2022    [id number] 45 10/02/2024        Lab Results   Component Value Date    HGBA1C 5.6 10/02/2024        Hematology:  Lab Results   Component Value Date    WBC 9.32 10/02/2024    RBC 4.28 10/02/2024    HGB 13.5 10/02/2024    HCT 40.3 10/02/2024    MCV 94.2 (H) 10/02/2024    MCH 31.5 (H) 10/02/2024    MCHC 33.5 10/02/2024    RDW 12.9 10/02/2024     10/02/2024    MPV 9.0  10/02/2024        Lipid Panel:  Lab Results   Component Value Date    CHOL 139 10/02/2024    HDL 41 10/02/2024    LDL 72.00 10/02/2024    TRIG 132 10/02/2024    TOTALCHOLEST 3 10/02/2024        Urine:  Lab Results   Component Value Date    APPEARANCEUA Clear 10/02/2024    SGUA 1.026 10/02/2024    PROTEINUA Trace (A) 10/02/2024    KETONESUA Negative 10/02/2024    LEUKOCYTESUR Negative 10/02/2024    RBCUA 0-5 10/02/2024    WBCUA 0-5 10/02/2024    BACTERIA None Seen 10/02/2024    SQEPUA Few (A) 10/02/2024    HYALINECASTS None Seen 10/02/2024    CREATRANDUR 238.0 (H) 10/02/2024    PROTEINURINE <6.8 05/12/2021          Assessment       ICD-10-CM ICD-9-CM   1. Chronic obstructive pulmonary disease, unspecified COPD type  J44.9 496   2. Mixed hyperlipidemia  E78.2 272.2   3. Type 2 diabetes mellitus without complication, without long-term current use of insulin  E11.9 250.00   4. Cigarette nicotine dependence without complication [F17.210]  F17.210 305.1   5. Vitamin D deficiency  E55.9 268.9   6. Supraventricular tachycardia  I47.10 427.89   7. BMI 27.0-27.9,adult  Z68.27 V85.23   8. Need for influenza vaccination  Z23 V04.81        Plan     1. Chronic obstructive pulmonary disease, unspecified COPD type  Refills today   Stable  Use inhalers as prescribed (rinse mouth after use of steroid inhalers).    Use long term inhalers daily and rescue inhaler as needed.    Avoid triggers (high humidity, strong odors, chemical fumes).    Report signs of upper respiratory infection as soon as possible for early treatment.    Practice/encouraged abdominal breathing.   Eat smaller, more frequent meals.   Flu shot recommended yearly.    Smoking cessation encouraged   - albuterol (PROVENTIL) 2.5 mg /3 mL (0.083 %) nebulizer solution; Take 3 mLs (2.5 mg total) by nebulization 4 (four) times daily as needed for Wheezing or Shortness of Breath.  Dispense: 120 each; Refill: 6  - albuterol (PROVENTIL/VENTOLIN HFA) 90 mcg/actuation inhaler;  Inhale 1-2 puffs into the lungs every 6 (six) hours as needed for Wheezing or Shortness of Breath. Rescue  Dispense: 6.7 g; Refill: 2    2. Mixed hyperlipidemia  Lab Results   Component Value Date    LDL 72.00 10/02/2024    CHOL 139 10/02/2024    HDL 41 10/02/2024    TRIG 132 10/02/2024       Cont RX daily; refills given. Take Omega 3 daily.   Stressed importance of dietary modifications. Follow a low cholesterol, low saturated fat diet with less that 200mg of cholesterol a day.  Avoid fried foods and high saturated fats (high saturated fats less than 7% of calories).  Add Flax Seed/Fish Oil supplements to diet. Increase dietary fiber.  Regular exercise can reduce LDL and raise HDL. Stressed importance of physical activity 5 times per week for 30 minutes per day.    - Urinalysis, Reflex to Urine Culture; Future  - CBC Auto Differential; Future  - Comprehensive Metabolic Panel; Future  - Lipid Panel; Future  - TSH; Future  - Hemoglobin A1C; Future  - Microalbumin/Creatinine Ratio, Urine; Future    3. Type 2 diabetes mellitus without complication, without long-term current use of insulin    A1C 5.6  Meds continued and refilled.  ADA diet; more fruits and vegetables, lean meats, plant based sources of protein, less added sugar, less processed foods.   Medication compliance, and strict home glucose monitoring advised.  Goal A1C <7 %  Diabetic foot exam annually:  Diabetic eye exam annually:  Urine Microalbumin every 6 months:   - Urinalysis, Reflex to Urine Culture; Future  - CBC Auto Differential; Future  - Comprehensive Metabolic Panel; Future  - Lipid Panel; Future  - TSH; Future  - Hemoglobin A1C; Future  - Microalbumin/Creatinine Ratio, Urine; Future    4. Cigarette nicotine dependence without complication [F17.210]  Smoking cessation advised. Instructed on smoking cessation program through [facility] and pharmacological interventions to aid in cessation.  >5 minutes allotted to educate patient on the harms of smoking,  the urgency to quit, and the development of a plan for smoking cessation.     5. Vitamin D deficiency  Vitamin D level reviewed and is currently at goal, between 30-80 ng/mL. Continue OTC Vitamin D3 2000 IU daily.    - Vitamin D; Future    6. Supraventricular tachycardia  RRR  Metoprolol per Cardio    7. BMI 27.0-27.9,adult  Goal BMI <30.  Exercise 5 times a week for 30 minutes per day.  Avoid soda, simple sugars, excessive rice, potatoes or bread. Limit fast foods and fried foods.  Choose complex carbs in moderation (example: green vegetables, beans, oatmeal). Eat plenty of fresh fruits and vegetables with lean meats daily.  Do not skip meals. Eat a balanced portion size.  Avoid fad diets. Consider permanent healthy life style changes.       8. Need for influenza vaccination  Admin today  - influenza (adjuvanted) (Fluad) 45 mcg/0.5 mL IM vaccine (> or = 66 yo) 0.5 mL    9. Breast cancer screening by mammogram (Primary)    - Mammo Digital Screening Bilat w/ Mateo; Future        Current Outpatient Medications   Medication Instructions    acetaminophen (TYLENOL) 500 mg, Every 6 hours PRN    albuterol (PROVENTIL) 2.5 mg, Nebulization, 4 times daily PRN    albuterol (PROVENTIL/VENTOLIN HFA) 90 mcg/actuation inhaler 1-2 puffs, Inhalation, Every 6 hours PRN, Rescue    aspirin 81 mg, Daily    blood sugar diagnostic Strp To check BG 2 times daily, to use with insurance preferred meter    blood-glucose meter kit To check BG 2 times daily, to use with insurance preferred meter    cholecalciferol (vitamin D3) 50,000 Units, Oral, Every 7 days    cholecalciferol (vitamin D3) 50,000 Units, Oral, Every 7 days    fluticasone-umeclidin-vilanter (TRELEGY ELLIPTA) 200-62.5-25 mcg inhaler 1 puff, Inhalation, Daily    lancets Misc To check BG 2 times daily, to use with insurance preferred meter    metFORMIN (GLUCOPHAGE) 500 mg, Oral, Daily    metoprolol succinate (TOPROL-XL) 50 mg, Oral, 2 times daily, ALONG WITH A 25MG TAB =75MG BID     metoprolol succinate (TOPROL-XL) 25 mg, Oral, 2 times daily    nitroGLYCERIN (NITROSTAT) 0.4 mg, Sublingual, Every 5 min PRN    ONETOUCH DELICA PLUS LANCET 33 gauge Misc Apply topically.    ONETOUCH ULTRA2 METER Misc SMARTSI Via Meter Daily    pravastatin (PRAVACHOL) 80 mg, Oral, Nightly    triamcinolone acetonide 0.1% (KENALOG) 0.1 % cream Topical (Top), 2 times daily, Use as directed    vitamin D (VITAMIN D3) 2,000 Units, Oral, Daily       Orders Placed This Encounter   Procedures    Urinalysis, Reflex to Urine Culture    CBC Auto Differential    Comprehensive Metabolic Panel    Lipid Panel    TSH    Hemoglobin A1C    Vitamin D    Microalbumin/Creatinine Ratio, Urine         Future Appointments   Date Time Provider Department Center   3/11/2025  8:45 AM Patrica Genao PA-C [facility]   4/10/2025  8:20 AM [facility] Bryant [facility]   10/6/2025 10:00 AM PROVIDER, [facility]        Follow up in about 5 months (around 3/10/2025) for fasting labs.    Labs thoroughly reviewed with patient. Medication refills addressed today.  RTC prn and 5 months, with labs 1 week prior to the apt.  COVID 19 precautions given to patient.  Patient voices understanding of all discharge instructions.      ANGELITO Soto

## 2025-01-09 ENCOUNTER — HOSPITAL ENCOUNTER (OUTPATIENT)
Dept: RADIOLOGY | Facility: HOSPITAL | Age: 75
Discharge: HOME | End: 2025-01-09
Attending: NURSE PRACTITIONER
Payer: MEDICARE

## 2025-01-09 DIAGNOSIS — Z12.31 BREAST CANCER SCREENING BY MAMMOGRAM: ICD-10-CM

## 2025-01-09 PROCEDURE — 77063 BREAST TOMOSYNTHESIS BI: CPT | Mod: TC

## 2025-01-28 ENCOUNTER — OFFICE VISIT (OUTPATIENT)
Dept: URGENT CARE | Facility: CLINIC | Age: 75
End: 2025-01-28
Payer: MEDICARE

## 2025-01-28 VITALS
DIASTOLIC BLOOD PRESSURE: 69 MMHG | RESPIRATION RATE: 20 BRPM | HEIGHT: 62 IN | TEMPERATURE: 99 F | BODY MASS INDEX: 27.79 KG/M2 | WEIGHT: 151 LBS | OXYGEN SATURATION: 95 % | SYSTOLIC BLOOD PRESSURE: 127 MMHG | HEART RATE: 71 BPM

## 2025-01-28 DIAGNOSIS — R05.9 COUGH, UNSPECIFIED TYPE: ICD-10-CM

## 2025-01-28 DIAGNOSIS — R06.02 SHORTNESS OF BREATH: Primary | ICD-10-CM

## 2025-01-28 DIAGNOSIS — J44.1 COPD EXACERBATION: ICD-10-CM

## 2025-01-28 PROCEDURE — 96372 THER/PROPH/DIAG INJ SC/IM: CPT | Mod: ,,, | Performed by: FAMILY MEDICINE

## 2025-01-28 PROCEDURE — 99214 OFFICE O/P EST MOD 30 MIN: CPT | Mod: 25,,, | Performed by: FAMILY MEDICINE

## 2025-01-28 RX ORDER — BENZONATATE 100 MG/1
100 CAPSULE ORAL 3 TIMES DAILY PRN
Qty: 30 CAPSULE | Refills: 0 | Status: SHIPPED | OUTPATIENT
Start: 2025-01-28 | End: 2025-02-07

## 2025-01-28 RX ORDER — PREDNISONE 20 MG/1
20 TABLET ORAL DAILY
Qty: 3 TABLET | Refills: 0 | Status: SHIPPED | OUTPATIENT
Start: 2025-01-28 | End: 2025-01-31

## 2025-01-28 RX ORDER — AZELASTINE 1 MG/ML
1 SPRAY, METERED NASAL 2 TIMES DAILY
Qty: 30 ML | Refills: 1 | Status: SHIPPED | OUTPATIENT
Start: 2025-01-28 | End: 2025-02-27

## 2025-01-28 RX ORDER — AZITHROMYCIN 250 MG/1
TABLET, FILM COATED ORAL
Qty: 6 TABLET | Refills: 0 | Status: SHIPPED | OUTPATIENT
Start: 2025-01-28 | End: 2025-02-02

## 2025-01-28 RX ORDER — DEXAMETHASONE SODIUM PHOSPHATE 10 MG/ML
6 INJECTION INTRAMUSCULAR; INTRAVENOUS
Status: COMPLETED | OUTPATIENT
Start: 2025-01-28 | End: 2025-01-28

## 2025-01-28 RX ADMIN — DEXAMETHASONE SODIUM PHOSPHATE 6 MG: 10 INJECTION INTRAMUSCULAR; INTRAVENOUS at 12:01

## 2025-01-28 NOTE — PROGRESS NOTES
"Subjective:      Patient ID: Fadumo Lynch is a 75 y.o. female.    Vitals:  height is 5' 2" (1.575 m) and weight is 68.5 kg (151 lb). Her temperature is 99 °F (37.2 °C). Her blood pressure is 127/69 and her pulse is 71. Her respiration is 20 and oxygen saturation is 95%.     Chief Complaint: Cough    75 y.o. female presents to clinic with c/o productive cough, yellow mucus, chest/nasal congestion, wheezing, sob, low grade fever starting 4-5 days ago. Pt has tried mucinex- no relief.  Patient has COPD.  Patient continues to smoke.  Denies any vomiting or diarrhea.  Low-grade temp only.  Patient states the Medrol Dosepak causes her to itch but she would like to try prednisone.  States steroid injections do not cause her to itch .  A1c 5.6      Constitution: Negative.   HENT: Negative.     Cardiovascular: Negative.    Eyes: Negative.    Respiratory:  Positive for cough, sputum production, COPD and shortness of breath.    Gastrointestinal: Negative.    Genitourinary: Negative.    Musculoskeletal: Negative.    Skin: Negative.    Allergic/Immunologic: Negative.    Neurological: Negative.    Hematologic/Lymphatic: Negative.       Objective:     Physical Exam   Constitutional: She is oriented to person, place, and time. She appears well-developed. She is cooperative.  Non-toxic appearance. She does not appear ill. No distress.   HENT:   Head: Normocephalic and atraumatic.   Ears:   Right Ear: Hearing and external ear normal.   Left Ear: Hearing and external ear normal.   Mouth/Throat: Oropharynx is clear and moist and mucous membranes are normal. No oropharyngeal exudate or posterior oropharyngeal erythema (postnasal drip).   Eyes: Conjunctivae and lids are normal.   Neck: Trachea normal and phonation normal. Neck supple. No edema present. No erythema present. No neck rigidity present.   Cardiovascular: Normal rate.   Pulmonary/Chest: Effort normal. No stridor. No respiratory distress. She has no decreased breath " sounds. She has no wheezes (diminished breath sounds). She has no rhonchi. She has no rales.   Abdominal: Normal appearance.   Neurological: She is alert and oriented to person, place, and time. She exhibits normal muscle tone. Coordination normal.   Skin: Skin is warm, dry, intact, not diaphoretic and no rash.   Psychiatric: Her speech is normal and behavior is normal. Mood, judgment and thought content normal.   Nursing note and vitals reviewed.         Previous History      Review of patient's allergies indicates:   Allergen Reactions    Medrol [methylprednisolone] Itching    Naproxen Hives    Zinc sulfate      Other reaction(s): LATE EFFECT OF BURNS OF OTHER SPECIFIED SITES       Past Medical History:   Diagnosis Date    CKD (chronic kidney disease)     COPD (chronic obstructive pulmonary disease)     Diabetes mellitus     Hyperlipidemia     Hypertension     Supraventricular tachycardia      Current Outpatient Medications   Medication Instructions    acetaminophen (TYLENOL) 500 mg, Every 6 hours PRN    albuterol (PROVENTIL) 2.5 mg, Nebulization, 4 times daily PRN    albuterol (PROVENTIL/VENTOLIN HFA) 90 mcg/actuation inhaler 1-2 puffs, Inhalation, Every 6 hours PRN, Rescue    aspirin 81 mg, Daily    azelastine (ASTELIN) 137 mcg, Nasal, 2 times daily    azithromycin (Z-CUONG) 250 MG tablet Take 2 tablets by mouth on day 1; Take 1 tablet by mouth on days 2-5      benzonatate (TESSALON) 100 mg, Oral, 3 times daily PRN    blood sugar diagnostic Strp To check BG 2 times daily, to use with insurance preferred meter    blood-glucose meter kit To check BG 2 times daily, to use with insurance preferred meter    cholecalciferol (vitamin D3) 50,000 Units, Oral, Every 7 days    cholecalciferol (vitamin D3) 50,000 Units, Oral, Every 7 days    fluticasone-umeclidin-vilanter (TRELEGY ELLIPTA) 200-62.5-25 mcg inhaler 1 puff, Inhalation, Daily    lancets Misc To check BG 2 times daily, to use with insurance preferred meter     "metFORMIN (GLUCOPHAGE) 500 mg, Oral, Daily    metoprolol succinate (TOPROL-XL) 50 mg, Oral, 2 times daily, ALONG WITH A 25MG TAB =75MG BID    metoprolol succinate (TOPROL-XL) 25 mg, Oral, 2 times daily    nitroGLYCERIN (NITROSTAT) 0.4 mg, Sublingual, Every 5 min PRN    ONETOUCH DELICA PLUS LANCET 33 gauge Misc Apply topically.    ONETOUCH ULTRA2 METER Misc SMARTSI Via Meter Daily    pravastatin (PRAVACHOL) 80 mg, Oral, Nightly    predniSONE (DELTASONE) 20 mg, Oral, Daily    triamcinolone acetonide 0.1% (KENALOG) 0.1 % cream Topical (Top), 2 times daily, Use as directed    vitamin D (VITAMIN D3) 2,000 Units, Daily     Past Surgical History:   Procedure Laterality Date    TONSILLECTOMY AND ADENOIDECTOMY      VAGINAL HYSTERECTOMY       Family History   Problem Relation Name Age of Onset    Colon cancer Mother      Cancer Father         Social History     Tobacco Use    Smoking status: Every Day     Current packs/day: 0.50     Average packs/day: 0.5 packs/day for 58.1 years (29.0 ttl pk-yrs)     Types: Cigarettes     Start date: 1967     Passive exposure: Never    Smokeless tobacco: Never    Tobacco comments:     Offered cessation; pt refused.   Substance Use Topics    Alcohol use: Never    Drug use: Never        Physical Exam      Vital Signs Reviewed   /69   Pulse 71   Temp 99 °F (37.2 °C)   Resp 20   Ht 5' 2" (1.575 m)   Wt 68.5 kg (151 lb)   SpO2 95%   BMI 27.62 kg/m²        Procedures    Procedures     Labs     Results for orders placed or performed in visit on 10/02/24   Comprehensive Metabolic Panel    Collection Time: 10/02/24  8:21 AM   Result Value Ref Range    Sodium 138 136 - 145 mmol/L    Potassium 4.1 3.5 - 5.1 mmol/L    Chloride 102 98 - 107 mmol/L    CO2 29 23 - 31 mmol/L    Glucose 136 (H) 82 - 115 mg/dL    Blood Urea Nitrogen 8.8 (L) 9.8 - 20.1 mg/dL    Creatinine 0.87 0.55 - 1.02 mg/dL    Calcium 9.5 8.4 - 10.2 mg/dL    Protein Total 7.3 5.8 - 7.6 gm/dL    Albumin 3.4 3.4 - " 4.8 g/dL    Globulin 3.9 (H) 2.4 - 3.5 gm/dL    Albumin/Globulin Ratio 0.9 (L) 1.1 - 2.0 ratio    Bilirubin Total 0.4 <=1.5 mg/dL     40 - 150 unit/L    ALT 16 0 - 55 unit/L    AST 16 5 - 34 unit/L    eGFR >60 mL/min/1.73/m2    Anion Gap 7.0 mEq/L    BUN/Creatinine Ratio 10    Lipid Panel    Collection Time: 10/02/24  8:21 AM   Result Value Ref Range    Cholesterol Total 139 <=200 mg/dL    HDL Cholesterol 41 35 - 60 mg/dL    Triglyceride 132 37 - 140 mg/dL    Cholesterol/HDL Ratio 3 0 - 5    Very Low Density Lipoprotein 26     LDL Cholesterol 72.00 50.00 - 140.00 mg/dL   Hemoglobin A1C    Collection Time: 10/02/24  8:21 AM   Result Value Ref Range    Hemoglobin A1c 5.6 <=7.0 %    Estimated Average Glucose 114.0 mg/dL   Vitamin D    Collection Time: 10/02/24  8:21 AM   Result Value Ref Range    Vitamin D 45 30 - 80 ng/mL   CBC with Differential    Collection Time: 10/02/24  8:21 AM   Result Value Ref Range    WBC 9.32 4.50 - 11.50 x10(3)/mcL    RBC 4.28 4.20 - 5.40 x10(6)/mcL    Hgb 13.5 12.0 - 16.0 g/dL    Hct 40.3 37.0 - 47.0 %    MCV 94.2 (H) 80.0 - 94.0 fL    MCH 31.5 (H) 27.0 - 31.0 pg    MCHC 33.5 33.0 - 36.0 g/dL    RDW 12.9 11.5 - 17.0 %    Platelet 292 130 - 400 x10(3)/mcL    MPV 9.0 7.4 - 10.4 fL    Neut % 60.8 %    Lymph % 29.9 %    Mono % 5.2 %    Eos % 2.7 %    Basophil % 0.9 %    Lymph # 2.79 0.6 - 4.6 x10(3)/mcL    Neut # 5.67 2.1 - 9.2 x10(3)/mcL    Mono # 0.48 0.1 - 1.3 x10(3)/mcL    Eos # 0.25 0 - 0.9 x10(3)/mcL    Baso # 0.08 <=0.2 x10(3)/mcL    Imm Gran # 0.05 (H) 0 - 0.04 x10(3)/mcL    Imm Grans % 0.5 %    NRBC% 0.0 %   Urinalysis    Collection Time: 10/02/24  8:24 AM   Result Value Ref Range    Color, UA Yellow Yellow, Light-Yellow, Dark Yellow, Courtney, Straw    Appearance, UA Clear Clear    Specific Gravity, UA 1.026 1.005 - 1.030    pH, UA 5.0 5.0 - 8.5    Protein, UA Trace (A) Negative    Glucose, UA Normal Negative, Normal    Ketones, UA Negative Negative    Blood, UA Negative Negative     Bilirubin, UA Negative Negative    Urobilinogen, UA 1+ (A) 0.2, 1.0, Normal    Nitrites, UA Negative Negative    Leukocyte Esterase, UA Negative Negative    RBC, UA 0-5 None Seen, 0-2, 3-5, 0-5 /HPF    WBC, UA 0-5 None Seen, 0-2, 3-5, 0-5 /HPF    Bacteria, UA None Seen None Seen /HPF    Squamous Epithelial Cells, UA Few (A) None Seen /HPF    Mucous, UA Trace (A) None Seen /LPF    Hyaline Casts, UA None Seen None Seen /lpf   Microalbumin/Creatinine Ratio, Urine    Collection Time: 10/02/24  8:24 AM   Result Value Ref Range    Urine Microalbumin 9.3 <=30.0 ug/mL    Urine Creatinine 238.0 (H) 45.0 - 106.0 mg/dL    Microalbumin Creatinine Ratio 3.9 0.0 - 30.0 mg/gm Cr   \  Assessment:     1. Shortness of breath    2. Cough, unspecified type    3. COPD exacerbation        Plan:   X-ray negative for pneumonia  Medications sent to pharmacy  Start antibiotics today  Start oral steroids tomorrow  If steroids cause you to itch stop them immediately  Stop smoking  Monitor for fever  As discussed persist or worsen return to clinic or seek medical attention immediately    Shortness of breath  -     X-Ray Chest PA And Lateral; Future; Expected date: 01/28/2025    Cough, unspecified type  -     X-Ray Chest PA And Lateral; Future; Expected date: 01/28/2025    COPD exacerbation    Other orders  -     benzonatate (TESSALON) 100 MG capsule; Take 1 capsule (100 mg total) by mouth 3 (three) times daily as needed for Cough.  Dispense: 30 capsule; Refill: 0  -     azithromycin (Z-CUONG) 250 MG tablet; Take 2 tablets by mouth on day 1; Take 1 tablet by mouth on days 2-5  Dispense: 6 tablet; Refill: 0  -     dexAMETHasone injection 6 mg  -     azelastine (ASTELIN) 137 mcg (0.1 %) nasal spray; 1 spray (137 mcg total) by Nasal route 2 (two) times daily.  Dispense: 30 mL; Refill: 1  -     predniSONE (DELTASONE) 20 MG tablet; Take 1 tablet (20 mg total) by mouth once daily. for 3 days  Dispense: 3 tablet; Refill: 0             Additional MDM:      Heart Failure Score:   COPD = Yes

## 2025-01-28 NOTE — PATIENT INSTRUCTIONS
Plan:   X-ray negative for pneumonia  Medications sent to pharmacy  Start antibiotics today  Start oral steroids tomorrow  If steroids cause you to itch stop them immediately  Stop smoking  Monitor for fever  As discussed persist or worsen return to clinic or seek medical attention immediately

## 2025-02-10 RX ORDER — METFORMIN HYDROCHLORIDE 500 MG/1
TABLET ORAL
Qty: 90 TABLET | Refills: 1 | Status: SHIPPED | OUTPATIENT
Start: 2025-02-10

## 2025-04-02 ENCOUNTER — LAB VISIT (OUTPATIENT)
Dept: LAB | Facility: HOSPITAL | Age: 75
End: 2025-04-02
Attending: NURSE PRACTITIONER
Payer: MEDICARE

## 2025-04-02 DIAGNOSIS — E78.2 MIXED HYPERLIPIDEMIA: ICD-10-CM

## 2025-04-02 DIAGNOSIS — E55.9 VITAMIN D DEFICIENCY: ICD-10-CM

## 2025-04-02 DIAGNOSIS — E11.9 TYPE 2 DIABETES MELLITUS WITHOUT COMPLICATION, WITHOUT LONG-TERM CURRENT USE OF INSULIN: ICD-10-CM

## 2025-04-02 LAB
25(OH)D3+25(OH)D2 SERPL-MCNC: 38 NG/ML (ref 30–80)
ALBUMIN SERPL-MCNC: 3.4 G/DL (ref 3.4–4.8)
ALBUMIN/GLOB SERPL: 0.8 RATIO (ref 1.1–2)
ALP SERPL-CCNC: 105 UNIT/L (ref 40–150)
ALT SERPL-CCNC: 14 UNIT/L (ref 0–55)
ANION GAP SERPL CALC-SCNC: 7 MEQ/L
AST SERPL-CCNC: 15 UNIT/L (ref 11–45)
BACTERIA #/AREA URNS AUTO: ABNORMAL /HPF
BASOPHILS # BLD AUTO: 0.08 X10(3)/MCL
BASOPHILS NFR BLD AUTO: 0.7 %
BILIRUB SERPL-MCNC: 0.4 MG/DL
BILIRUB UR QL STRIP.AUTO: NEGATIVE
BUN SERPL-MCNC: 10.3 MG/DL (ref 9.8–20.1)
CALCIUM SERPL-MCNC: 9.3 MG/DL (ref 8.4–10.2)
CHLORIDE SERPL-SCNC: 102 MMOL/L (ref 98–107)
CHOLEST SERPL-MCNC: 123 MG/DL
CHOLEST/HDLC SERPL: 3 {RATIO} (ref 0–5)
CLARITY UR: CLEAR
CO2 SERPL-SCNC: 28 MMOL/L (ref 23–31)
COLOR UR AUTO: YELLOW
CREAT SERPL-MCNC: 0.78 MG/DL (ref 0.55–1.02)
CREAT UR-MCNC: 198.6 MG/DL (ref 45–106)
CREAT/UREA NIT SERPL: 13
EOSINOPHIL # BLD AUTO: 0.28 X10(3)/MCL (ref 0–0.9)
EOSINOPHIL NFR BLD AUTO: 2.5 %
ERYTHROCYTE [DISTWIDTH] IN BLOOD BY AUTOMATED COUNT: 12.6 % (ref 11.5–17)
EST. AVERAGE GLUCOSE BLD GHB EST-MCNC: 116.9 MG/DL
GFR SERPLBLD CREATININE-BSD FMLA CKD-EPI: >60 ML/MIN/1.73/M2
GLOBULIN SER-MCNC: 4.1 GM/DL (ref 2.4–3.5)
GLUCOSE SERPL-MCNC: 89 MG/DL (ref 82–115)
GLUCOSE UR QL STRIP: NORMAL
HBA1C MFR BLD: 5.7 %
HCT VFR BLD AUTO: 39.5 % (ref 37–47)
HDLC SERPL-MCNC: 43 MG/DL (ref 35–60)
HGB BLD-MCNC: 13.4 G/DL (ref 12–16)
HGB UR QL STRIP: ABNORMAL
HYALINE CASTS #/AREA URNS LPF: ABNORMAL /LPF
IMM GRANULOCYTES # BLD AUTO: 0.04 X10(3)/MCL (ref 0–0.04)
IMM GRANULOCYTES NFR BLD AUTO: 0.4 %
KETONES UR QL STRIP: NEGATIVE
LDLC SERPL CALC-MCNC: 55 MG/DL (ref 50–140)
LEUKOCYTE ESTERASE UR QL STRIP: 75
LYMPHOCYTES # BLD AUTO: 3.57 X10(3)/MCL (ref 0.6–4.6)
LYMPHOCYTES NFR BLD AUTO: 31.6 %
MCH RBC QN AUTO: 31.5 PG (ref 27–31)
MCHC RBC AUTO-ENTMCNC: 33.9 G/DL (ref 33–36)
MCV RBC AUTO: 92.7 FL (ref 80–94)
MICROALBUMIN UR-MCNC: 6.8 UG/ML
MICROALBUMIN/CREAT RATIO PNL UR: 3.4 MG/GM CR (ref 0–30)
MONOCYTES # BLD AUTO: 0.66 X10(3)/MCL (ref 0.1–1.3)
MONOCYTES NFR BLD AUTO: 5.8 %
MUCOUS THREADS URNS QL MICRO: ABNORMAL /LPF
NEUTROPHILS # BLD AUTO: 6.68 X10(3)/MCL (ref 2.1–9.2)
NEUTROPHILS NFR BLD AUTO: 59 %
NITRITE UR QL STRIP: NEGATIVE
NRBC BLD AUTO-RTO: 0 %
PH UR STRIP: 5 [PH]
PLATELET # BLD AUTO: 317 X10(3)/MCL (ref 130–400)
PMV BLD AUTO: 9.1 FL (ref 7.4–10.4)
POTASSIUM SERPL-SCNC: 4.4 MMOL/L (ref 3.5–5.1)
PROT SERPL-MCNC: 7.5 GM/DL (ref 5.8–7.6)
PROT UR QL STRIP: NEGATIVE
RBC # BLD AUTO: 4.26 X10(6)/MCL (ref 4.2–5.4)
RBC #/AREA URNS AUTO: ABNORMAL /HPF
SODIUM SERPL-SCNC: 137 MMOL/L (ref 136–145)
SP GR UR STRIP.AUTO: 1.02 (ref 1–1.03)
SQUAMOUS #/AREA URNS LPF: ABNORMAL /HPF
TRIGL SERPL-MCNC: 126 MG/DL (ref 37–140)
TSH SERPL-ACNC: 1.3 UIU/ML (ref 0.35–4.94)
UROBILINOGEN UR STRIP-ACNC: NORMAL
VLDLC SERPL CALC-MCNC: 25 MG/DL
WBC # BLD AUTO: 11.31 X10(3)/MCL (ref 4.5–11.5)
WBC #/AREA URNS AUTO: ABNORMAL /HPF

## 2025-04-02 PROCEDURE — 36415 COLL VENOUS BLD VENIPUNCTURE: CPT

## 2025-04-02 PROCEDURE — 82306 VITAMIN D 25 HYDROXY: CPT

## 2025-04-02 PROCEDURE — 80061 LIPID PANEL: CPT

## 2025-04-02 PROCEDURE — 80053 COMPREHEN METABOLIC PANEL: CPT

## 2025-04-02 PROCEDURE — 81001 URINALYSIS AUTO W/SCOPE: CPT

## 2025-04-02 PROCEDURE — 83036 HEMOGLOBIN GLYCOSYLATED A1C: CPT

## 2025-04-02 PROCEDURE — 85025 COMPLETE CBC W/AUTO DIFF WBC: CPT

## 2025-04-02 PROCEDURE — 84443 ASSAY THYROID STIM HORMONE: CPT

## 2025-04-02 PROCEDURE — 82570 ASSAY OF URINE CREATININE: CPT

## 2025-04-07 ENCOUNTER — TELEPHONE (OUTPATIENT)
Dept: INTERNAL MEDICINE | Facility: CLINIC | Age: 75
End: 2025-04-07
Payer: MEDICARE

## 2025-04-07 NOTE — TELEPHONE ENCOUNTER
----- Message from Robina sent at 4/7/2025 10:21 AM CDT -----  Who Called: Fadumo LynchRefill or New Rx:RefillRX Name and Strength: metoprolol succinate (TOPROL-XL) 25 MG 24 hr tabletHow is the patient currently taking it? (ex. 1XDay):Is this a 30 day or 90 day RX:Local or Mail Order:List of preferred pharmacies on file (remove unneeded):  [id number] AT [address] AT  Ordering Provider:Preferred Method of Contact: Phone CallPatient's Preferred Phone Number on File: [phone] Best Call Back Number, if different:Additional Information:

## 2025-04-10 ENCOUNTER — TELEPHONE (OUTPATIENT)
Dept: INTERNAL MEDICINE | Facility: CLINIC | Age: 75
End: 2025-04-10

## 2025-04-10 ENCOUNTER — OFFICE VISIT (OUTPATIENT)
Dept: INTERNAL MEDICINE | Facility: CLINIC | Age: 75
End: 2025-04-10
Payer: MEDICARE

## 2025-04-10 VITALS
RESPIRATION RATE: 16 BRPM | SYSTOLIC BLOOD PRESSURE: 98 MMHG | OXYGEN SATURATION: 95 % | WEIGHT: 155.69 LBS | DIASTOLIC BLOOD PRESSURE: 52 MMHG | HEART RATE: 62 BPM | TEMPERATURE: 98 F | HEIGHT: 62 IN | BODY MASS INDEX: 28.65 KG/M2

## 2025-04-10 DIAGNOSIS — F17.210 CIGARETTE NICOTINE DEPENDENCE WITHOUT COMPLICATION: ICD-10-CM

## 2025-04-10 DIAGNOSIS — J44.9 CHRONIC OBSTRUCTIVE PULMONARY DISEASE, UNSPECIFIED COPD TYPE: ICD-10-CM

## 2025-04-10 DIAGNOSIS — E11.9 TYPE 2 DIABETES MELLITUS WITHOUT COMPLICATION, WITHOUT LONG-TERM CURRENT USE OF INSULIN: ICD-10-CM

## 2025-04-10 DIAGNOSIS — E78.2 MIXED HYPERLIPIDEMIA: ICD-10-CM

## 2025-04-10 DIAGNOSIS — M85.80 OSTEOPENIA AFTER MENOPAUSE: ICD-10-CM

## 2025-04-10 DIAGNOSIS — E55.9 VITAMIN D DEFICIENCY: ICD-10-CM

## 2025-04-10 DIAGNOSIS — I47.10 SUPRAVENTRICULAR TACHYCARDIA: ICD-10-CM

## 2025-04-10 DIAGNOSIS — Z12.31 BREAST CANCER SCREENING BY MAMMOGRAM: ICD-10-CM

## 2025-04-10 DIAGNOSIS — Z78.0 OSTEOPENIA AFTER MENOPAUSE: ICD-10-CM

## 2025-04-10 PROCEDURE — 99215 OFFICE O/P EST HI 40 MIN: CPT | Mod: PBBFAC | Performed by: NURSE PRACTITIONER

## 2025-04-10 RX ORDER — PRAVASTATIN SODIUM 80 MG/1
80 TABLET ORAL NIGHTLY
Qty: 90 TABLET | Refills: 2 | Status: SHIPPED | OUTPATIENT
Start: 2025-04-10 | End: 2026-04-10

## 2025-04-10 RX ORDER — ALBUTEROL SULFATE 0.83 MG/ML
2.5 SOLUTION RESPIRATORY (INHALATION) 4 TIMES DAILY PRN
Qty: 120 EACH | Refills: 6 | Status: SHIPPED | OUTPATIENT
Start: 2025-04-10

## 2025-04-10 RX ORDER — ALBUTEROL SULFATE 90 UG/1
1-2 INHALANT RESPIRATORY (INHALATION) EVERY 6 HOURS PRN
Qty: 6.7 G | Refills: 2 | Status: SHIPPED | OUTPATIENT
Start: 2025-04-10

## 2025-04-10 RX ORDER — METFORMIN HYDROCHLORIDE 500 MG/1
500 TABLET ORAL
Qty: 90 TABLET | Refills: 2 | Status: SHIPPED | OUTPATIENT
Start: 2025-04-10

## 2025-04-10 NOTE — LETTER
This communication is flagged as high priority.        AUTHORIZATION FOR RELEASE OF   CONFIDENTIAL INFORMATION    Dear Medical Records,    We are seeing Fadumo Lynch, date of birth 1950, in the clinic at [facility]. Harika Bryant FNP is the patient's PCP. Fadumo Lynch has an outstanding lab/procedure at the time we reviewed her chart. In order to help keep her health information updated, she has authorized us to request the following medical record(s):        (  )  MAMMOGRAM                                      (  )  COLONOSCOPY      (  )  PAP SMEAR                                          (  )  OUTSIDE LAB RESULTS     (  )  DEXA SCAN                                          (XXX  )  EYE EXAM            (  )  FOOT EXAM                                          (  )  ENTIRE RECORD     (  )  OUTSIDE IMMUNIZATIONS                 (  )  _______________         Please fax records to [facility], Duplechain, Amy C, FNP, [phone]     If you have any questions, please contact [facility] at [phone].           Patient Name: Fadumo Lynch  : 1950  Patient Phone #: [phone]

## 2025-04-10 NOTE — Clinical Note
Please request diabetic eye exam results from [facility]; Follows [facility] clinic, Dr. Aguila; cataract extraction 11/2024

## 2025-04-10 NOTE — TELEPHONE ENCOUNTER
----- Message from ANGELITO Garcia sent at 4/10/2025  9:38 AM CDT -----  Please request diabetic eye exam results from [facility]; Follows [facility], Dr. Aguila; cataract extraction 11/2024

## 2025-04-10 NOTE — PROGRESS NOTES
Internal Medicine Clinic  ANGELITO Soto     Patient Name: Fadumo Lynch   : 1950  MRN:[id number]     Chief Complaint     Chief Complaint   Patient presents with    Follow-up    Labs Only        History of Present Illness     75 year old white female, presents in clinic for lab review. No new or acute complaints.     PMH SVT, HLD, HTN, CKD, COPD, T2DM, Osteopenia, multinodular thyroid, basal cell carcinoma, tobacco user. Pt states she is not ready to quit smoking; 10 cig/d. Pt is followed by Renal clinic for stage 3 CKD, cardiology clinic for SVT and medications, and ENT for basal cell carcinoma (nasal lesion) and thyroid nodules. Reports significant improvement in her palpitations with the metoprolol succinate 75 mg twice daily dose as per Cardio. Pulmonary clinic is following for pulmonary nodule. Denies chest pain, shortness of breath, cough, fever, headache, dizziness, weakness, abdominal pain, nausea, vomiting, diarrhea, constipation, dysuria, depression, anxiety.     Thyroid US FINDINGS:2022  Examination reveals the right hemithyroid to measure 4.4 cm x 1.9 x 1.6 cm, left earnest thyroid measures 3.7 x 1.7 x 1.5 cm isthmus measures 4.3 mm in thickness  On the right multiple anechoic/hypoechoic nodules are identified within the right lobe of the thyroid the largest measuring 1.2 x 1.4 x 0.6 cm mesh least stable to minimally enlarged as compared with the previous exam other subcentimeter nodules are identified largest measuring 6 mm x 5 mm x 3 mm.  On the left multiple subcentimeter nodules are identified a larger subcentimeter nodule measures 1.1 by 1 x 8 mm corresponding to a TI-RADS type 4 nodule follow-up is recommended if greater than 1 cm at 1 year, 2 years, 3 years and 5 years.  No other abnormalities  Impression:  Nodules as above with recommendations as above.  The subcentimeter nodules do not meet criteria for biopsy or surveillance.  No significant change since 2019       "  Mammogram 1/10/25; BIRADS 1  DEXA 12/13/2022; Normal bone mineral density of the lumbar vertebrae. Improved osteopenia of the femurs.        Exercise nuclear stress test on Florina 15, 2021 which revealed no ischemia and no scar.   ECHO 9/2020; EF 40 %  Cologuard neg 10/19/2022  Following ENT; LOV 3/22/2021  Diabetic fundus; 1/2022 No retinopathy  Follows [facility], Dr. Aguila; cataract extraction 11/2024                                 Review of Systems     Review of Systems   Constitutional: Negative.    HENT: Negative.     Eyes: Negative.    Respiratory: Negative.     Cardiovascular: Negative.    Gastrointestinal: Negative.    Endocrine: Negative.    Genitourinary: Negative.    Musculoskeletal: Negative.    Integumentary:  Negative.   Allergic/Immunologic: Negative.    Neurological: Negative.    Hematological: Negative.    Psychiatric/Behavioral: Negative.     All other systems reviewed and are negative.       Physical Examination     Visit Vitals  BP (!) 98/52   Pulse 62   Temp 97.8 °F (36.6 °C) (Oral)   Resp 16   Ht 5' 2" (1.575 m)   Wt 70.6 kg (155 lb 11.2 oz)   SpO2 95%   BMI 28.48 kg/m²        BP Readings from Last 6 Encounters:   04/10/25 (!) 98/52   01/28/25 127/69   10/08/24 124/65   09/27/24 (!) 113/49   09/16/24 121/75   09/05/24 98/60   ]    Wt Readings from Last 6 Encounters:   04/10/25 70.6 kg (155 lb 11.2 oz)   01/28/25 68.5 kg (151 lb)   10/08/24 68.5 kg (151 lb)   09/27/24 69.9 kg (154 lb 1.6 oz)   09/16/24 69.9 kg (154 lb)   09/05/24 69.2 kg (152 lb 8.9 oz)   ]    BMI Readings from Last 3 Encounters:   01/28/25 27.62 kg/m²   10/08/24 27.62 kg/m²   09/27/24 28.19 kg/m²         Physical Exam  Vitals and nursing note reviewed.   Constitutional:       Appearance: Normal appearance.   HENT:      Head: Normocephalic and atraumatic.      Right Ear: Tympanic membrane, ear canal and external ear normal.      Left Ear: Tympanic membrane, ear canal and external ear normal.      Nose: Nose normal.      " Mouth/Throat:      Mouth: Mucous membranes are moist.      Pharynx: Oropharynx is clear.   Eyes:      Extraocular Movements: Extraocular movements intact.      Conjunctiva/sclera: Conjunctivae normal.      Pupils: Pupils are equal, round, and reactive to light.   Cardiovascular:      Rate and Rhythm: Normal rate and regular rhythm.      Pulses: Normal pulses.      Heart sounds: Normal heart sounds.   Pulmonary:      Effort: Pulmonary effort is normal.      Breath sounds: Normal breath sounds.   Abdominal:      General: Abdomen is flat. Bowel sounds are normal.      Palpations: Abdomen is soft.   Musculoskeletal:         General: Normal range of motion.      Cervical back: Normal range of motion and neck supple.   Skin:     General: Skin is warm and dry.      Capillary Refill: Capillary refill takes less than 2 seconds.   Neurological:      General: No focal deficit present.      Mental Status: She is alert and oriented to person, place, and time. Mental status is at baseline.   Psychiatric:         Mood and Affect: Mood normal.         Behavior: Behavior normal.         Thought Content: Thought content normal.         Judgment: Judgment normal.          Labs / Imaging     Chemistry:  Lab Results   Component Value Date     04/02/2025    K 4.4 04/02/2025    BUN 10.3 04/02/2025    CREATININE 0.78 04/02/2025    EGFRNORACEVR >60 04/02/2025    GLUCOSE 89 04/02/2025    CALCIUM 9.3 04/02/2025    ALKPHOS 105 04/02/2025    LABPROT 7.5 04/02/2025    ALBUMIN 3.4 04/02/2025    BILIDIR 0.2 08/31/2021    IBILI 0.20 08/31/2021    AST 15 04/02/2025    ALT 14 04/02/2025    MG 2.10 12/26/2022    PHOS 3.3 12/26/2022    YMHCKPSB61LT 38 04/02/2025        Lab Results   Component Value Date    HGBA1C 5.7 04/02/2025        Hematology:  Lab Results   Component Value Date    WBC 11.31 04/02/2025    RBC 4.26 04/02/2025    HGB 13.4 04/02/2025    HCT 39.5 04/02/2025    MCV 92.7 04/02/2025    MCH 31.5 (H) 04/02/2025    MCHC 33.9 04/02/2025     RDW 12.6 04/02/2025     04/02/2025    MPV 9.1 04/02/2025        Lipid Panel:  Lab Results   Component Value Date    CHOL 123 04/02/2025    HDL 43 04/02/2025    LDL 55.00 04/02/2025    TRIG 126 04/02/2025    TOTALCHOLEST 3 04/02/2025        Urine:  Lab Results   Component Value Date    APPEARANCEUA Clear 04/02/2025    SGUA 1.022 04/02/2025    PROTEINUA Negative 04/02/2025    KETONESUA Negative 04/02/2025    LEUKOCYTESUR 75 (A) 04/02/2025    RBCUA 6-10 (A) 04/02/2025    WBCUA 0-5 04/02/2025    BACTERIA Trace (A) 04/02/2025    SQEPUA Few (A) 04/02/2025    HYALINECASTS None Seen 04/02/2025    CREATRANDUR 198.6 (H) 04/02/2025    PROTEINURINE <6.8 05/12/2021          Assessment       ICD-10-CM ICD-9-CM   1. Chronic obstructive pulmonary disease, unspecified COPD type  J44.9 496   2. Mixed hyperlipidemia  E78.2 272.2   3. Type 2 diabetes mellitus without complication, without long-term current use of insulin  E11.9 250.00   4. Supraventricular tachycardia  I47.10 427.89   5. Vitamin D deficiency  E55.9 268.9   6. Cigarette nicotine dependence without complication [F17.210]  F17.210 305.1   7. BMI 28.0-28.9,adult  Z68.28 V85.24   8. Osteopenia after menopause  M85.80 733.90    Z78.0 V49.81   9. Breast cancer screening by mammogram  Z12.31 V76.12        Plan     1. Chronic obstructive pulmonary disease, unspecified COPD type  Breathing stable  Following [facility] PULM for nodule f/u  Use inhalers as prescribed (rinse mouth after use of steroid inhalers).    Use long term inhalers daily and rescue inhaler as needed.    Avoid triggers (high humidity, strong odors, chemical fumes).    Report signs of upper respiratory infection as soon as possible for early treatment.    Practice/encouraged abdominal breathing.   Eat smaller, more frequent meals.   Flu shot recommended yearly.    Smoking cessation encouraged   - albuterol (PROVENTIL) 2.5 mg /3 mL (0.083 %) nebulizer solution; Take 3 mLs (2.5 mg total) by nebulization 4  (four) times daily as needed for Wheezing or Shortness of Breath.  Dispense: 120 each; Refill: 6  - albuterol (PROVENTIL/VENTOLIN HFA) 90 mcg/actuation inhaler; Inhale 1-2 puffs into the lungs every 6 (six) hours as needed for Wheezing or Shortness of Breath. Rescue  Dispense: 6.7 g; Refill: 2    2. Mixed hyperlipidemia  Lab Results   Component Value Date    LDL 55.00 04/02/2025    CHOL 123 04/02/2025    HDL 43 04/02/2025    TRIG 126 04/02/2025       Cont RX daily; refills given. Take OTC Fish oil/[place] 3/DHA EPA daily.   Stressed importance of dietary modifications. Follow a low cholesterol, low saturated fat diet with less that 200mg of cholesterol a day.  Avoid fried foods and high saturated fats (high saturated fats less than 7% of calories).  Add Flax Seed/Fish Oil supplements to diet. Increase dietary fiber.  Regular exercise can reduce LDL and raise HDL. Stressed importance of physical activity 5 times per week for 30 minutes per day.    - pravastatin (PRAVACHOL) 80 MG tablet; Take 1 tablet (80 mg total) by mouth every evening.  Dispense: 90 tablet; Refill: 2  - Urinalysis, Reflex to Urine Culture; Future  - TSH; Future  - Hemoglobin A1C; Future  - Lipid Panel; Future  - Comprehensive Metabolic Panel; Future  - CBC Auto Differential; Future  - T4, Free; Future  - Microalbumin/Creatinine Ratio, Urine; Future    3. Type 2 diabetes mellitus without complication, without long-term current use of insulin    Lab Results   Component Value Date    HGBA1C 5.7 04/02/2025     Meds continued and refilled.  ADA diet; more fruits and vegetables, lean meats, plant based sources of protein, less added sugar, less processed foods.   Medication compliance, and strict home glucose monitoring advised.  Goal A1C <7 %  Diabetic foot exam annually:  Diabetic eye exam annually:  Urine Microalbumin every 6 months:   - Urinalysis, Reflex to Urine Culture; Future  - TSH; Future  - Hemoglobin A1C; Future  - Lipid Panel; Future  -  Comprehensive Metabolic Panel; Future  - CBC Auto Differential; Future  - T4, Free; Future  - Microalbumin/Creatinine Ratio, Urine; Future    4. Supraventricular tachycardia  Following Cardio, rate controlled on metoprolol    5. Vitamin D deficiency  Vitamin D level reviewed and is currently at goal, between 30-80 ng/mL. Continue OTC Vitamin D3 2000 IU daily.   - Vitamin D; Future    6. Cigarette nicotine dependence without complication [F17.210]  Smoking cessation advised. Instructed on smoking cessation program through [facility] and pharmacological interventions to aid in cessation.  >5 minutes allotted to educate patient on the harms of smoking, the urgency to quit, and the development of a plan for smoking cessation.     7. BMI 28.0-28.9,adult  Goal BMI <30.  Exercise 5 times a week for 30 minutes per day.  Avoid soda, simple sugars, excessive rice, potatoes or bread. Limit fast foods and fried foods.  Choose complex carbs in moderation (example: green vegetables, beans, oatmeal). Eat plenty of fresh fruits and vegetables with lean meats daily.  Do not skip meals. Eat a balanced portion size.  Avoid fad diets. Consider permanent healthy life style changes.       8. Osteopenia after menopause    - DXA Bone Density Axial Skeleton 1 or more sites; Future    9. Breast cancer screening by mammogram    - Mammo Digital Screening Bilat w/ Mateo (XPD); Future      Current Outpatient Medications   Medication Instructions    acetaminophen (TYLENOL) 500 mg, Every 6 hours PRN    albuterol (PROVENTIL) 2.5 mg, Nebulization, 4 times daily PRN    albuterol (PROVENTIL/VENTOLIN HFA) 90 mcg/actuation inhaler 1-2 puffs, Inhalation, Every 6 hours PRN, Rescue    aspirin 81 mg, Daily    azelastine (ASTELIN) 137 mcg, Nasal, 2 times daily    blood sugar diagnostic Strp To check BG 2 times daily, to use with insurance preferred meter    blood-glucose meter kit To check BG 2 times daily, to use with insurance preferred meter    cholecalciferol  (vitamin D3) 50,000 Units, Oral, Every 7 days    cholecalciferol (vitamin D3) 50,000 Units, Oral, Every 7 days    fluticasone-umeclidin-vilanter (TRELEGY ELLIPTA) 200-62.5-25 mcg inhaler 1 puff, Inhalation, Daily    lancets Misc To check BG 2 times daily, to use with insurance preferred meter    metFORMIN (GLUCOPHAGE) 500 mg, Oral, With breakfast    metoprolol succinate (TOPROL-XL) 50 mg, Oral, 2 times daily, ALONG WITH A 25MG TAB =75MG BID    metoprolol succinate (TOPROL-XL) 25 mg, Oral, 2 times daily    nitroGLYCERIN (NITROSTAT) 0.4 mg, Sublingual, Every 5 min PRN    ONETOUCH DELICA PLUS LANCET 33 gauge Misc Apply topically.    ONETOUCH ULTRA2 METER Misc SMARTSI Via Meter Daily    pravastatin (PRAVACHOL) 80 mg, Oral, Nightly    triamcinolone acetonide 0.1% (KENALOG) 0.1 % cream Topical (Top), 2 times daily, Use as directed    vitamin D (VITAMIN D3) 2,000 Units, Daily       Orders Placed This Encounter   Procedures    DXA Bone Density Axial Skeleton 1 or more sites    Mammo Digital Screening Bilat w/ Mateo (XPD)    Urinalysis, Reflex to Urine Culture    Vitamin D    TSH    Hemoglobin A1C    Lipid Panel    Comprehensive Metabolic Panel    CBC Auto Differential    T4, Free    Microalbumin/Creatinine Ratio, Urine         Future Appointments   Date Time Provider Department Center   2025 11:15 AM Patrica Genao PA-C [facility] CARD [facility]   10/6/2025 10:00 AM PROVIDER, [facility] PULMONOLOGY [facility] PULM [facility]   10/14/2025  8:20 AM Harika Bryant FNP [facility]        Follow up in about 6 months (around 10/10/2025) for fasting labs.    Labs thoroughly reviewed with patient. Medication refills addressed today.  RTC prn and 6 months, with labs 1 week prior to the apt.  COVID 19 precautions given to patient.  Patient voices understanding of all discharge instructions.      ANGELITO Soto

## 2025-04-14 ENCOUNTER — HOSPITAL ENCOUNTER (OUTPATIENT)
Dept: RADIOLOGY | Facility: HOSPITAL | Age: 75
Discharge: HOME | End: 2025-04-14
Attending: NURSE PRACTITIONER
Payer: MEDICARE

## 2025-04-14 DIAGNOSIS — Z78.0 OSTEOPENIA AFTER MENOPAUSE: ICD-10-CM

## 2025-04-14 DIAGNOSIS — M85.80 OSTEOPENIA AFTER MENOPAUSE: ICD-10-CM

## 2025-04-14 PROCEDURE — 77080 DXA BONE DENSITY AXIAL: CPT | Mod: TC

## 2025-05-27 DIAGNOSIS — J44.9 CHRONIC OBSTRUCTIVE PULMONARY DISEASE, UNSPECIFIED COPD TYPE: ICD-10-CM

## 2025-05-27 RX ORDER — ALBUTEROL SULFATE 0.83 MG/ML
2.5 SOLUTION RESPIRATORY (INHALATION) 4 TIMES DAILY PRN
Qty: 120 EACH | Refills: 6 | Status: SHIPPED | OUTPATIENT
Start: 2025-05-27

## 2025-05-29 ENCOUNTER — HOSPITAL ENCOUNTER (OUTPATIENT)
Dept: CARDIOLOGY | Facility: HOSPITAL | Age: 75
Discharge: HOME | End: 2025-05-29
Attending: NURSE PRACTITIONER
Payer: MEDICARE

## 2025-05-29 ENCOUNTER — OFFICE VISIT (OUTPATIENT)
Dept: INTERNAL MEDICINE | Facility: CLINIC | Age: 75
End: 2025-05-29
Payer: MEDICARE

## 2025-05-29 ENCOUNTER — HOSPITAL ENCOUNTER (OUTPATIENT)
Dept: RADIOLOGY | Facility: HOSPITAL | Age: 75
Discharge: HOME | End: 2025-05-29
Attending: NURSE PRACTITIONER
Payer: MEDICARE

## 2025-05-29 VITALS
DIASTOLIC BLOOD PRESSURE: 60 MMHG | WEIGHT: 156.31 LBS | TEMPERATURE: 98 F | HEART RATE: 61 BPM | RESPIRATION RATE: 18 BRPM | SYSTOLIC BLOOD PRESSURE: 94 MMHG | OXYGEN SATURATION: 96 % | HEIGHT: 62 IN | BODY MASS INDEX: 28.76 KG/M2

## 2025-05-29 DIAGNOSIS — I47.10 SUPRAVENTRICULAR TACHYCARDIA: ICD-10-CM

## 2025-05-29 DIAGNOSIS — J44.1 CHRONIC OBSTRUCTIVE PULMONARY DISEASE WITH ACUTE EXACERBATION: ICD-10-CM

## 2025-05-29 DIAGNOSIS — F17.210 CIGARETTE NICOTINE DEPENDENCE WITHOUT COMPLICATION: ICD-10-CM

## 2025-05-29 DIAGNOSIS — J44.9 CHRONIC OBSTRUCTIVE PULMONARY DISEASE, UNSPECIFIED COPD TYPE: ICD-10-CM

## 2025-05-29 DIAGNOSIS — R07.9 CHEST PAIN AT REST: ICD-10-CM

## 2025-05-29 DIAGNOSIS — E11.9 TYPE 2 DIABETES MELLITUS WITHOUT COMPLICATION, WITHOUT LONG-TERM CURRENT USE OF INSULIN: ICD-10-CM

## 2025-05-29 DIAGNOSIS — M81.0 OSTEOPOROSIS WITHOUT CURRENT PATHOLOGICAL FRACTURE, UNSPECIFIED OSTEOPOROSIS TYPE: ICD-10-CM

## 2025-05-29 PROCEDURE — 93005 ELECTROCARDIOGRAM TRACING: CPT

## 2025-05-29 PROCEDURE — 71046 X-RAY EXAM CHEST 2 VIEWS: CPT | Mod: TC

## 2025-05-29 PROCEDURE — 99215 OFFICE O/P EST HI 40 MIN: CPT | Mod: PBBFAC,25 | Performed by: NURSE PRACTITIONER

## 2025-05-29 RX ORDER — PREDNISONE 20 MG/1
20 TABLET ORAL DAILY
Qty: 5 TABLET | Refills: 0 | Status: SHIPPED | OUTPATIENT
Start: 2025-05-29 | End: 2025-06-03

## 2025-05-29 RX ORDER — AZITHROMYCIN 250 MG/1
TABLET, FILM COATED ORAL
Qty: 6 TABLET | Refills: 0 | Status: SHIPPED | OUTPATIENT
Start: 2025-05-29 | End: 2025-06-03

## 2025-05-29 RX ORDER — ALENDRONATE SODIUM 70 MG/1
70 TABLET ORAL
Qty: 4 TABLET | Refills: 11 | Status: SHIPPED | OUTPATIENT
Start: 2025-05-29 | End: 2026-05-29

## 2025-05-29 NOTE — PROGRESS NOTES
Internal Medicine Clinic  ANGELITO Soto     Patient Name: Fadumo Lynch   : 1950  MRN:[id number]     Chief Complaint     Chief Complaint   Patient presents with    Follow-up    Dizziness        History of Present Illness     75 year old white female, presents in clinic for lab review. C/o feeling off balance x 2 wks, BP low today 94/60 and at prior apt. Note, she is on metoprolol succ 75 bid for SVT. Denies falls. States productive cough yellow mucous and increase shakiness and FRASER over the past couple of weeks. Endorses 2 episodes of chest pain (lasting a couple of minutes) on the same day, that occurred 5 days prior. Did not take nitro at home. No CP today. States left ear pain felt on 2 days ago. Will send for labs, UA, CXR and EKG today. Afebrile. Rate controlled at 61. Denies palpitations or weakness. O2 sat 98 %.  Requests DEXA results from 4/15/25.     PMH SVT, HLD, HTN, CKD, COPD, T2DM, Osteoporosis, multinodular thyroid, basal cell carcinoma, tobacco user. Pt states she is not ready to quit smoking; 10 cig/d. Pt is followed by Renal clinic for stage 3 CKD, cardiology clinic for SVT and medications, and ENT for basal cell carcinoma (nasal lesion) and thyroid nodules. Reports significant improvement in her palpitations with the metoprolol succinate 75 mg twice daily dose as per Cardio. Pulmonary clinic is following for pulmonary nodule. Denies chest pain, shortness of breath, cough, fever, headache, dizziness, weakness, abdominal pain, nausea, vomiting, diarrhea, constipation, dysuria, depression, anxiety.     Thyroid US FINDINGS:2022  Examination reveals the right hemithyroid to measure 4.4 cm x 1.9 x 1.6 cm, left earnest thyroid measures 3.7 x 1.7 x 1.5 cm isthmus measures 4.3 mm in thickness  On the right multiple anechoic/hypoechoic nodules are identified within the right lobe of the thyroid the largest measuring 1.2 x 1.4 x 0.6 cm mesh least stable to minimally enlarged as compared  "with the previous exam other subcentimeter nodules are identified largest measuring 6 mm x 5 mm x 3 mm.  On the left multiple subcentimeter nodules are identified a larger subcentimeter nodule measures 1.1 by 1 x 8 mm corresponding to a TI-RADS type 4 nodule follow-up is recommended if greater than 1 cm at 1 year, 2 years, 3 years and 5 years.  No other abnormalities  Impression:  Nodules as above with recommendations as above.  The subcentimeter nodules do not meet criteria for biopsy or surveillance.  No significant change since June 2019        Mammogram 1/10/25; BIRADS 1  DEXA 12/13/2022; Normal bone mineral density of the lumbar vertebrae. Improved osteopenia of the femurs.  DEXA 4/15/2025; Osteoporosis based on the left forearm.  Significantly increased fracture risk.     Exercise nuclear stress test on Florina 15, 2021 which revealed no ischemia and no scar.   ECHO 9/2020; EF 40 %  Cologuard neg 10/19/2022  Following ENT; LOV 3/22/2021  Diabetic fundus; 1/2022 No retinopathy  Follows [facility], Dr. Aguila; cataract extraction 11/2024                     Review of Systems     Review of Systems   Constitutional: Negative.    HENT:  Positive for ear pain.         Left   Eyes: Negative.    Respiratory:  Positive for cough, shortness of breath and wheezing.    Cardiovascular:  Positive for chest pain.   Gastrointestinal: Negative.    Endocrine: Negative.    Genitourinary: Negative.    Musculoskeletal: Negative.    Integumentary:  Negative.   Allergic/Immunologic: Negative.    Neurological:  Positive for dizziness and light-headedness.   Hematological: Negative.    Psychiatric/Behavioral: Negative.     All other systems reviewed and are negative.       Physical Examination     Visit Vitals  BP 94/60   Pulse 61   Temp 98.1 °F (36.7 °C) (Oral)   Resp 18   Ht 5' 2" (1.575 m)   Wt 70.9 kg (156 lb 4.8 oz)   SpO2 96%   BMI 28.59 kg/m²        BP Readings from Last 6 Encounters:   05/29/25 94/60   04/10/25 (!) 98/52 "   01/28/25 127/69   10/08/24 124/65   09/27/24 (!) 113/49   09/16/24 121/75   ]    Wt Readings from Last 6 Encounters:   05/29/25 70.9 kg (156 lb 4.8 oz)   04/10/25 70.6 kg (155 lb 11.2 oz)   01/28/25 68.5 kg (151 lb)   10/08/24 68.5 kg (151 lb)   09/27/24 69.9 kg (154 lb 1.6 oz)   09/16/24 69.9 kg (154 lb)   ]    BMI Readings from Last 3 Encounters:   05/29/25 28.59 kg/m²   04/10/25 28.48 kg/m²   01/28/25 27.62 kg/m²         Physical Exam  Vitals and nursing note reviewed.   Constitutional:       Appearance: Normal appearance.   HENT:      Head: Normocephalic and atraumatic.      Right Ear: Tympanic membrane, ear canal and external ear normal.      Left Ear: Tympanic membrane, ear canal and external ear normal.      Nose: Nose normal.      Mouth/Throat:      Mouth: Mucous membranes are moist.      Pharynx: Oropharynx is clear.   Eyes:      Extraocular Movements: Extraocular movements intact.      Conjunctiva/sclera: Conjunctivae normal.      Pupils: Pupils are equal, round, and reactive to light.   Cardiovascular:      Rate and Rhythm: Normal rate and regular rhythm.      Pulses: Normal pulses.      Heart sounds: Normal heart sounds.   Pulmonary:      Effort: Pulmonary effort is normal.      Breath sounds: Wheezing present.   Abdominal:      General: Abdomen is flat. Bowel sounds are normal.      Palpations: Abdomen is soft.   Musculoskeletal:         General: Normal range of motion.      Cervical back: Normal range of motion and neck supple.   Skin:     General: Skin is warm and dry.      Capillary Refill: Capillary refill takes less than 2 seconds.   Neurological:      General: No focal deficit present.      Mental Status: She is alert and oriented to person, place, and time. Mental status is at baseline.   Psychiatric:         Mood and Affect: Mood normal.         Behavior: Behavior normal.         Thought Content: Thought content normal.         Judgment: Judgment normal.          Labs / Imaging      Chemistry:  Lab Results   Component Value Date     05/29/2025    K 4.6 05/29/2025    BUN 8.2 (L) 05/29/2025    CREATININE 0.79 05/29/2025    EGFRNORACEVR >60 05/29/2025    CALCIUM 9.2 05/29/2025    ALKPHOS 107 05/29/2025    ALBUMIN 3.7 05/29/2025    BILIDIR 0.2 08/31/2021    IBILI 0.20 08/31/2021    AST 19 05/29/2025    ALT 16 05/29/2025    MG 2.10 12/26/2022    PHOS 3.3 12/26/2022    AOUOFVRN49WH 38 04/02/2025        Lab Results   Component Value Date    HGBA1C 5.8 05/29/2025        Hematology:  Lab Results   Component Value Date    WBC 10.97 05/29/2025    RBC 4.59 05/29/2025    HGB 14.0 05/29/2025    HCT 43.6 05/29/2025    MCV 95.0 (H) 05/29/2025    MCH 30.5 05/29/2025    MCHC 32.1 (L) 05/29/2025    RDW 13.0 05/29/2025     05/29/2025    MPV 9.3 05/29/2025        Lipid Panel:  Lab Results   Component Value Date    CHOL 123 04/02/2025    HDL 43 04/02/2025    LDL 55.00 04/02/2025    TRIG 126 04/02/2025    TOTALCHOLEST 3 04/02/2025        Urine:  Lab Results   Component Value Date    APPEARANCEUA Clear 05/29/2025    SGUA 1.014 05/29/2025    PROTEINUA Negative 05/29/2025    KETONESUA Negative 05/29/2025    LEUKOCYTESUR Negative 05/29/2025    RBCUA 0-5 05/29/2025    WBCUA 0-5 05/29/2025    BACTERIA None Seen 05/29/2025    SQEPUA Few (A) 05/29/2025    HYALINECASTS None Seen 05/29/2025    CREATRANDUR 198.6 (H) 04/02/2025    PROTEINURINE <6.8 05/12/2021          Assessment       ICD-10-CM ICD-9-CM   1. Chronic obstructive pulmonary disease with acute exacerbation  J44.1 491.21   2. Type 2 diabetes mellitus without complication, without long-term current use of insulin  E11.9 250.00   3. Chest pain at rest  R07.9 786.50   4. Supraventricular tachycardia  I47.10 427.89   5. Cigarette nicotine dependence without complication [F17.210]  F17.210 305.1   6. Osteoporosis without current pathological fracture, unspecified osteoporosis type  M81.0 733.00   7. BMI 28.0-28.9,adult  Z68.28 V85.24        Plan     1.  Chronic obstructive pulmonary disease with acute exacerbation    RX prednisone 20 daily x 5 days  RX zpak  EKG NSR  CXR neg for acute process  Use inhalers as prescribed (rinse mouth after use of steroid inhalers).    Use long term inhalers daily and rescue inhaler as needed.    Avoid triggers (high humidity, strong odors, chemical fumes).    Report signs of upper respiratory infection as soon as possible for early treatment.    Practice/encouraged abdominal breathing.   Eat smaller, more frequent meals.   Flu shot recommended yearly.    Smoking cessation encouraged   - EKG 12-lead; Future  - X-Ray Chest PA And Lateral; Future    2. Type 2 diabetes mellitus without complication, without long-term current use of insulin    Lab Results   Component Value Date    HGBA1C 5.8 05/29/2025   Denies hypoglycemia  Meds continued  ADA diet; more fruits and vegetables, lean meats, plant based sources of protein, less added sugar, less processed foods.   Medication compliance, and strict home glucose monitoring advised.  Goal A1C <7 %  Diabetic foot exam annually:  Diabetic eye exam annually:  Urine Microalbumin every 6 months:   - Urinalysis, Reflex to Urine Culture; Future  - Hemoglobin A1C; Future  - Comprehensive Metabolic Panel; Future  - CBC Auto Differential; Future  - Troponin I; Future    3. Chest pain at rest  Troponin neg  EKG no acute finding  ED precautions for worsening   - EKG 12-lead; Future  - X-Ray Chest PA And Lateral; Future  - Urinalysis, Reflex to Urine Culture; Future  - Hemoglobin A1C; Future  - Comprehensive Metabolic Panel; Future  - CBC Auto Differential; Future  - Troponin I; Future    4. Supraventricular tachycardia  EKG NSR  CXR neg for acute process  Due to hypotensive, decrease metoprolol from 75 bid to 50 bid  F/u 2-4 wks for BP and HR check  Home monitoring of vitals encouraged.  - EKG 12-lead; Future  - X-Ray Chest PA And Lateral; Future    5. Cigarette nicotine dependence without complication  [F17.210]  Smoking cessation advised. Instructed on smoking cessation program through [facility] and pharmacological interventions to aid in cessation.  >5 minutes allotted to educate patient on the harms of smoking, the urgency to quit, and the development of a plan for smoking cessation.     6. Osteoporosis without current pathological fracture, unspecified osteoporosis type  DEXA-bone density test has been reviewed and displays osteoporosis or brittle bones, which predisposes the pt to fractures. Pt will need adequate calcium and vitamin D, regular exercise, smoking cessation if smokes, counseling on fall prevention, and avoidance of heavy alcohol use if pt is a heavy drinker. I am starting the pt on a medication called Fosamax; pt is to take this once a week.    In order for Fosamax to be sufficiently absorbed, it needs to be administered in the morning >=30 minutes before the first food, beverage (except plain water), or other medications. Pt should sit upright or stand for 30 minutes after taking the medication to prevent stomach problems.    Calcium and other supplements/medications containing aluminum, iron, magnesium, zinc can cause bisphosphonates (Fosamax) to be insufficiently absorbed; please advise pt to wait >=30 to 60 minutes after taking Fosamax to take calcium or other medications and supplements that interfere with the absorption of Fosamax.   - alendronate (FOSAMAX) 70 MG tablet; Take 1 tablet (70 mg total) by mouth every 7 days.  Dispense: 4 tablet; Refill: 11    7. BMI 28.0-28.9,adult  Goal BMI <30.  Exercise 5 times a week for 30 minutes per day.  Avoid soda, simple sugars, excessive rice, potatoes or bread. Limit fast foods and fried foods.  Choose complex carbs in moderation (example: green vegetables, beans, oatmeal). Eat plenty of fresh fruits and vegetables with lean meats daily.  Do not skip meals. Eat a balanced portion size.  Avoid fad diets. Consider permanent healthy life style changes.              Current Outpatient Medications   Medication Instructions    acetaminophen (TYLENOL) 500 mg, Every 6 hours PRN    albuterol (PROVENTIL) 2.5 mg, Nebulization, 4 times daily PRN    albuterol (PROVENTIL/VENTOLIN HFA) 90 mcg/actuation inhaler 1-2 puffs, Inhalation, Every 6 hours PRN, Rescue    alendronate (FOSAMAX) 70 mg, Oral, Every 7 days    aspirin 81 mg, Daily    azelastine (ASTELIN) 137 mcg, Nasal, 2 times daily    azithromycin (Z-CUONG) 250 MG tablet Take 2 tablets by mouth on day 1; Take 1 tablet by mouth on days 2-5      blood sugar diagnostic Strp To check BG 2 times daily, to use with insurance preferred meter    blood-glucose meter kit To check BG 2 times daily, to use with insurance preferred meter    cholecalciferol (vitamin D3) 50,000 Units, Oral, Every 7 days    cholecalciferol (vitamin D3) 50,000 Units, Oral, Every 7 days    fluticasone-umeclidin-vilanter (TRELEGY ELLIPTA) 200-62.5-25 mcg inhaler 1 puff, Inhalation, Daily    lancets Misc To check BG 2 times daily, to use with insurance preferred meter    metFORMIN (GLUCOPHAGE) 500 mg, Oral, With breakfast    metoprolol succinate (TOPROL-XL) 50 mg, Oral, 2 times daily, ALONG WITH A 25MG TAB =75MG BID    metoprolol succinate (TOPROL-XL) 25 mg, Oral, 2 times daily    nitroGLYCERIN (NITROSTAT) 0.4 mg, Sublingual, Every 5 min PRN    ONETOUCH DELICA PLUS LANCET 33 gauge Misc Apply topically.    ONETOUCH ULTRA2 METER Misc SMARTSI Via Meter Daily    pravastatin (PRAVACHOL) 80 mg, Oral, Nightly    predniSONE (DELTASONE) 20 mg, Oral, Daily    triamcinolone acetonide 0.1% (KENALOG) 0.1 % cream Topical (Top), 2 times daily, Use as directed    vitamin D (VITAMIN D3) 2,000 Units, Daily       Orders Placed This Encounter   Procedures    X-Ray Chest PA And Lateral    Urinalysis, Reflex to Urine Culture    Hemoglobin A1C    Comprehensive Metabolic Panel    CBC Auto Differential    Troponin I    EKG 12-lead         Future Appointments   Date Time Provider  Department Center   6/11/2025  8:30 AM NURSE, [facility]   6/11/2025 10:45 AM Patrica Genao PA-C [facility] CARD [facility]   10/6/2025 10:00 AM PROVIDER, [facility] PULMONOLOGY [facility] PULM [facility]   10/14/2025  8:20 AM Harika Bryant FNP [facility]        Follow up if symptoms worsen or fail to improve.    Labs thoroughly reviewed with patient. Medication refills addressed today.  RTC prn and October as scheduled, with labs 1 week prior to the apt.  COVID 19 precautions given to patient.  Patient voices understanding of all discharge instructions.      ANGELITO Soto        I spent a total of 65 minutes on the day of the visit.This includes face to face time and non-face to face time preparing to see the patient (eg, review of tests), obtaining and/or reviewing separately obtained history, documenting clinical information in the electronic or other health record, independently interpreting results and communicating results to the patient/family/caregiver, or care coordinator.

## 2025-05-30 LAB
OHS QRS DURATION: 70 MS
OHS QTC CALCULATION: 432 MS

## 2025-05-31 ENCOUNTER — HOSPITAL ENCOUNTER (EMERGENCY)
Facility: HOSPITAL | Age: 75
Discharge: HOME | End: 2025-05-31
Attending: EMERGENCY MEDICINE
Payer: MEDICARE

## 2025-05-31 VITALS
RESPIRATION RATE: 18 BRPM | SYSTOLIC BLOOD PRESSURE: 122 MMHG | HEIGHT: 62 IN | TEMPERATURE: 98 F | OXYGEN SATURATION: 99 % | HEART RATE: 80 BPM | DIASTOLIC BLOOD PRESSURE: 78 MMHG | BODY MASS INDEX: 28.39 KG/M2 | WEIGHT: 154.31 LBS

## 2025-05-31 DIAGNOSIS — I47.10 SVT (SUPRAVENTRICULAR TACHYCARDIA): ICD-10-CM

## 2025-05-31 PROCEDURE — 25000242 PHARM REV CODE 250 ALT 637 W/ HCPCS: Performed by: EMERGENCY MEDICINE

## 2025-05-31 PROCEDURE — 96374 THER/PROPH/DIAG INJ IV PUSH: CPT

## 2025-05-31 PROCEDURE — 27000221 HC OXYGEN, UP TO 24 HOURS

## 2025-05-31 PROCEDURE — 99284 EMERGENCY DEPT VISIT MOD MDM: CPT | Mod: 25

## 2025-05-31 PROCEDURE — 63600175 PHARM REV CODE 636 W HCPCS: Performed by: EMERGENCY MEDICINE

## 2025-05-31 PROCEDURE — 96375 TX/PRO/DX INJ NEW DRUG ADDON: CPT

## 2025-05-31 PROCEDURE — 93005 ELECTROCARDIOGRAM TRACING: CPT

## 2025-05-31 PROCEDURE — 94640 AIRWAY INHALATION TREATMENT: CPT

## 2025-05-31 RX ORDER — IPRATROPIUM BROMIDE AND ALBUTEROL SULFATE 2.5; .5 MG/3ML; MG/3ML
3 SOLUTION RESPIRATORY (INHALATION)
Status: COMPLETED | OUTPATIENT
Start: 2025-05-31 | End: 2025-05-31

## 2025-05-31 RX ORDER — ADENOSINE 3 MG/ML
INJECTION, SOLUTION INTRAVENOUS
Status: DISCONTINUED
Start: 2025-05-31 | End: 2025-05-31 | Stop reason: WASHOUT

## 2025-05-31 RX ORDER — ADENOSINE 3 MG/ML
6 INJECTION, SOLUTION INTRAVENOUS
Status: COMPLETED | OUTPATIENT
Start: 2025-05-31 | End: 2025-05-31

## 2025-05-31 RX ORDER — LORAZEPAM 2 MG/ML
1 INJECTION INTRAMUSCULAR
Status: COMPLETED | OUTPATIENT
Start: 2025-05-31 | End: 2025-05-31

## 2025-05-31 RX ADMIN — ADENOSINE 6 MG: 3 INJECTION INTRAVENOUS at 09:05

## 2025-05-31 RX ADMIN — LORAZEPAM 1 MG: 2 INJECTION INTRAMUSCULAR; INTRAVENOUS at 09:05

## 2025-05-31 RX ADMIN — IPRATROPIUM BROMIDE AND ALBUTEROL SULFATE 3 ML: .5; 3 SOLUTION RESPIRATORY (INHALATION) at 09:05

## 2025-06-01 ENCOUNTER — HOSPITAL ENCOUNTER (EMERGENCY)
Facility: HOSPITAL | Age: 75
Discharge: LEFT BEFORE DISCHARGE | End: 2025-06-01
Attending: EMERGENCY MEDICINE
Payer: MEDICARE

## 2025-06-01 VITALS
RESPIRATION RATE: 17 BRPM | DIASTOLIC BLOOD PRESSURE: 75 MMHG | SYSTOLIC BLOOD PRESSURE: 132 MMHG | TEMPERATURE: 98 F | OXYGEN SATURATION: 96 % | HEIGHT: 62 IN | WEIGHT: 152.75 LBS | HEART RATE: 80 BPM | BODY MASS INDEX: 28.11 KG/M2

## 2025-06-01 DIAGNOSIS — R07.9 CHEST PAIN: ICD-10-CM

## 2025-06-01 DIAGNOSIS — I47.10 SVT (SUPRAVENTRICULAR TACHYCARDIA): Primary | ICD-10-CM

## 2025-06-01 DIAGNOSIS — R00.0 HEART RATE FAST: ICD-10-CM

## 2025-06-01 DIAGNOSIS — R00.2 PALPITATIONS: ICD-10-CM

## 2025-06-01 LAB
ALBUMIN SERPL-MCNC: 3.6 G/DL (ref 3.4–4.8)
ALBUMIN/GLOB SERPL: 0.8 RATIO (ref 1.1–2)
ALP SERPL-CCNC: 111 UNIT/L (ref 40–150)
ALT SERPL-CCNC: 31 UNIT/L (ref 0–55)
ANION GAP SERPL CALC-SCNC: 12 MEQ/L
AST SERPL-CCNC: 20 UNIT/L (ref 11–45)
BASOPHILS # BLD AUTO: 0.07 X10(3)/MCL
BASOPHILS NFR BLD AUTO: 0.4 %
BILIRUB SERPL-MCNC: 0.3 MG/DL
BUN SERPL-MCNC: 13.3 MG/DL (ref 9.8–20.1)
CALCIUM SERPL-MCNC: 9.6 MG/DL (ref 8.4–10.2)
CHLORIDE SERPL-SCNC: 102 MMOL/L (ref 98–107)
CO2 SERPL-SCNC: 27 MMOL/L (ref 23–31)
CREAT SERPL-MCNC: 0.95 MG/DL (ref 0.55–1.02)
CREAT/UREA NIT SERPL: 14
EOSINOPHIL # BLD AUTO: 0.15 X10(3)/MCL (ref 0–0.9)
EOSINOPHIL NFR BLD AUTO: 0.9 %
ERYTHROCYTE [DISTWIDTH] IN BLOOD BY AUTOMATED COUNT: 13.2 % (ref 11.5–17)
GFR SERPLBLD CREATININE-BSD FMLA CKD-EPI: >60 ML/MIN/1.73/M2
GLOBULIN SER-MCNC: 4.4 GM/DL (ref 2.4–3.5)
GLUCOSE SERPL-MCNC: 120 MG/DL (ref 82–115)
HCT VFR BLD AUTO: 45.8 % (ref 37–47)
HGB BLD-MCNC: 14.7 G/DL (ref 12–16)
HOLD SPECIMEN: NORMAL
IMM GRANULOCYTES # BLD AUTO: 0.08 X10(3)/MCL (ref 0–0.04)
IMM GRANULOCYTES NFR BLD AUTO: 0.5 %
LYMPHOCYTES # BLD AUTO: 5.71 X10(3)/MCL (ref 0.6–4.6)
LYMPHOCYTES NFR BLD AUTO: 35.6 %
MAGNESIUM SERPL-MCNC: 2 MG/DL (ref 1.6–2.6)
MCH RBC QN AUTO: 30.8 PG (ref 27–31)
MCHC RBC AUTO-ENTMCNC: 32.1 G/DL (ref 33–36)
MCV RBC AUTO: 95.8 FL (ref 80–94)
MONOCYTES # BLD AUTO: 1.07 X10(3)/MCL (ref 0.1–1.3)
MONOCYTES NFR BLD AUTO: 6.7 %
NEUTROPHILS # BLD AUTO: 8.94 X10(3)/MCL (ref 2.1–9.2)
NEUTROPHILS NFR BLD AUTO: 55.9 %
NRBC BLD AUTO-RTO: 0 %
PLATELET # BLD AUTO: 353 X10(3)/MCL (ref 130–400)
PMV BLD AUTO: 9.5 FL (ref 7.4–10.4)
POTASSIUM SERPL-SCNC: 3.7 MMOL/L (ref 3.5–5.1)
PROT SERPL-MCNC: 8 GM/DL (ref 5.8–7.6)
RBC # BLD AUTO: 4.78 X10(6)/MCL (ref 4.2–5.4)
SODIUM SERPL-SCNC: 141 MMOL/L (ref 136–145)
TROPONIN I SERPL-MCNC: <0.01 NG/ML (ref 0–0.04)
TSH SERPL-ACNC: 1.26 UIU/ML (ref 0.35–4.94)
WBC # BLD AUTO: 16.02 X10(3)/MCL (ref 4.5–11.5)

## 2025-06-01 PROCEDURE — 63600175 PHARM REV CODE 636 W HCPCS

## 2025-06-01 PROCEDURE — 80053 COMPREHEN METABOLIC PANEL: CPT | Performed by: EMERGENCY MEDICINE

## 2025-06-01 PROCEDURE — 96375 TX/PRO/DX INJ NEW DRUG ADDON: CPT

## 2025-06-01 PROCEDURE — 96376 TX/PRO/DX INJ SAME DRUG ADON: CPT

## 2025-06-01 PROCEDURE — 84484 ASSAY OF TROPONIN QUANT: CPT | Performed by: EMERGENCY MEDICINE

## 2025-06-01 PROCEDURE — 83735 ASSAY OF MAGNESIUM: CPT | Performed by: EMERGENCY MEDICINE

## 2025-06-01 PROCEDURE — 93005 ELECTROCARDIOGRAM TRACING: CPT

## 2025-06-01 PROCEDURE — 25000003 PHARM REV CODE 250

## 2025-06-01 PROCEDURE — 84443 ASSAY THYROID STIM HORMONE: CPT | Performed by: EMERGENCY MEDICINE

## 2025-06-01 PROCEDURE — 96374 THER/PROPH/DIAG INJ IV PUSH: CPT

## 2025-06-01 PROCEDURE — 85025 COMPLETE CBC W/AUTO DIFF WBC: CPT | Performed by: EMERGENCY MEDICINE

## 2025-06-01 PROCEDURE — 99284 EMERGENCY DEPT VISIT MOD MDM: CPT | Mod: 25

## 2025-06-01 RX ORDER — ADENOSINE 3 MG/ML
6 INJECTION, SOLUTION INTRAVENOUS
Status: COMPLETED | OUTPATIENT
Start: 2025-06-01 | End: 2025-06-01

## 2025-06-01 RX ORDER — ADENOSINE 3 MG/ML
INJECTION, SOLUTION INTRAVENOUS
Status: COMPLETED
Start: 2025-06-01 | End: 2025-06-01

## 2025-06-01 RX ORDER — METOPROLOL TARTRATE 1 MG/ML
5 INJECTION, SOLUTION INTRAVENOUS
Status: COMPLETED | OUTPATIENT
Start: 2025-06-01 | End: 2025-06-01

## 2025-06-01 RX ORDER — METOPROLOL TARTRATE 1 MG/ML
INJECTION, SOLUTION INTRAVENOUS
Status: COMPLETED
Start: 2025-06-01 | End: 2025-06-01

## 2025-06-01 RX ADMIN — ADENOSINE 6 MG: 3 INJECTION, SOLUTION INTRAVENOUS at 09:06

## 2025-06-01 RX ADMIN — METOPROLOL TARTRATE 5 MG: 1 INJECTION, SOLUTION INTRAVENOUS at 08:06

## 2025-06-01 RX ADMIN — ADENOSINE 6 MG: 3 INJECTION INTRAVENOUS at 09:06

## 2025-06-01 RX ADMIN — ADENOSINE 6 MG: 3 INJECTION, SOLUTION INTRAVENOUS at 08:06

## 2025-06-01 RX ADMIN — ADENOSINE 6 MG: 3 INJECTION INTRAVENOUS at 08:06

## 2025-06-02 ENCOUNTER — OFFICE VISIT (OUTPATIENT)
Dept: CARDIOLOGY | Facility: CLINIC | Age: 75
End: 2025-06-02
Payer: MEDICARE

## 2025-06-02 VITALS
BODY MASS INDEX: 28.89 KG/M2 | TEMPERATURE: 98 F | RESPIRATION RATE: 24 BRPM | WEIGHT: 157 LBS | HEART RATE: 77 BPM | HEIGHT: 62 IN | SYSTOLIC BLOOD PRESSURE: 121 MMHG | DIASTOLIC BLOOD PRESSURE: 64 MMHG | OXYGEN SATURATION: 97 %

## 2025-06-02 DIAGNOSIS — J44.9 CHRONIC OBSTRUCTIVE PULMONARY DISEASE, UNSPECIFIED COPD TYPE: Primary | ICD-10-CM

## 2025-06-02 DIAGNOSIS — I50.32 HEART FAILURE WITH IMPROVED EJECTION FRACTION (HFIMPEF): ICD-10-CM

## 2025-06-02 DIAGNOSIS — E78.2 MIXED HYPERLIPIDEMIA: ICD-10-CM

## 2025-06-02 DIAGNOSIS — I10 PRIMARY HYPERTENSION: ICD-10-CM

## 2025-06-02 DIAGNOSIS — Z72.0 TOBACCO USER: ICD-10-CM

## 2025-06-02 DIAGNOSIS — I47.10 SUPRAVENTRICULAR TACHYCARDIA: ICD-10-CM

## 2025-06-02 DIAGNOSIS — R06.09 DOE (DYSPNEA ON EXERTION): ICD-10-CM

## 2025-06-02 PROBLEM — Z01.810 ENCOUNTER FOR PRE-OPERATIVE CARDIOVASCULAR CLEARANCE: Status: RESOLVED | Noted: 2024-09-05 | Resolved: 2025-06-02

## 2025-06-02 LAB
OHS QRS DURATION: 60 MS
OHS QRS DURATION: 68 MS
OHS QRS DURATION: 68 MS
OHS QRS DURATION: 70 MS
OHS QRS DURATION: 72 MS
OHS QRS DURATION: 72 MS
OHS QRS DURATION: 74 MS
OHS QTC CALCULATION: 435 MS
OHS QTC CALCULATION: 436 MS
OHS QTC CALCULATION: 440 MS
OHS QTC CALCULATION: 441 MS
OHS QTC CALCULATION: 441 MS
OHS QTC CALCULATION: 457 MS
OHS QTC CALCULATION: 462 MS

## 2025-06-02 PROCEDURE — 99215 OFFICE O/P EST HI 40 MIN: CPT | Mod: PBBFAC | Performed by: INTERNAL MEDICINE

## 2025-06-02 RX ORDER — LEVALBUTEROL INHALATION SOLUTION 1.25 MG/3ML
1 SOLUTION RESPIRATORY (INHALATION) EVERY 4 HOURS PRN
Qty: 1 EACH | Refills: 3 | Status: SHIPPED | OUTPATIENT
Start: 2025-06-02 | End: 2026-06-02

## 2025-06-02 RX ORDER — LEVALBUTEROL TARTRATE 45 UG/1
1-2 AEROSOL, METERED ORAL EVERY 4 HOURS PRN
Qty: 15 G | Refills: 3 | Status: SHIPPED | OUTPATIENT
Start: 2025-06-02 | End: 2026-06-02

## 2025-06-06 ENCOUNTER — TELEPHONE (OUTPATIENT)
Dept: INTERNAL MEDICINE | Facility: CLINIC | Age: 75
End: 2025-06-06
Payer: MEDICARE

## 2025-07-14 ENCOUNTER — TELEPHONE (OUTPATIENT)
Dept: INTERNAL MEDICINE | Facility: CLINIC | Age: 75
End: 2025-07-14
Payer: MEDICARE

## 2025-07-14 DIAGNOSIS — Z00.00 WELLNESS EXAMINATION: Primary | ICD-10-CM

## 2025-07-14 NOTE — TELEPHONE ENCOUNTER
Spoke to pt . Pt stated she has been experiencing pain in her uterine area for months  . Pt requesting referral to [facility]'s women's clinic . States she has not seen GYN in many years and has a history of a partial Hysterectomy  . Informed pt message  will be sent to PCP and advised pt visit ER for persistent/worsening pain for evaluation . Pt voiced understanding.

## 2025-07-14 NOTE — TELEPHONE ENCOUNTER
Copied from CRM #[id number]. Topic: General Inquiry - Patient Advice  >> Jul 14, 2025  9:27 AM Tisha wrote:  Who Called: Fadumo Lynch    Caller is requesting assistance/information from provider's office.    Symptoms (please be specific):     How long has patient had these symptoms:     List of preferred pharmacies on file (remove unneeded): [unfilled]  If different, enter pharmacy into here including location and phone number:        Preferred Method of Contact: Phone Call  Patient's Preferred Phone Number on File: [phone]   Best Call Back Number, if different:  Additional Information:  pt would like to speak with nurse about an ref

## 2025-07-15 ENCOUNTER — TELEPHONE (OUTPATIENT)
Dept: INTERNAL MEDICINE | Facility: CLINIC | Age: 75
End: 2025-07-15
Payer: MEDICARE

## 2025-07-15 NOTE — TELEPHONE ENCOUNTER
I can refer to GYN for routine female wellness. But if she is having pain she will need an apt to evaluate as it could be something non gyn related. Is she having dysuria, trouble with urination? N/V/D, fever, constipation, etc? Advise UCC/ER if needed.

## 2025-07-15 NOTE — TELEPHONE ENCOUNTER
"Spoke to pt . Informed her of PCP 's message below . Pt denies having any dysuria  N/V/D, fever, constipation, etc . States she was contacted by GYN and scheduled for appt on today. Pt sounds cheerful. States' " I am having a great day today ." Let Ahrika know I am grateful she takes good care of me.!"    "

## 2025-07-17 ENCOUNTER — OFFICE VISIT (OUTPATIENT)
Dept: GYNECOLOGY | Facility: CLINIC | Age: 75
End: 2025-07-17
Payer: MEDICARE

## 2025-07-17 VITALS
HEART RATE: 67 BPM | BODY MASS INDEX: 27.79 KG/M2 | DIASTOLIC BLOOD PRESSURE: 72 MMHG | TEMPERATURE: 99 F | OXYGEN SATURATION: 96 % | WEIGHT: 151 LBS | HEIGHT: 62 IN | SYSTOLIC BLOOD PRESSURE: 117 MMHG | RESPIRATION RATE: 18 BRPM

## 2025-07-17 DIAGNOSIS — Z01.419 WELL WOMAN EXAM WITH ROUTINE GYNECOLOGICAL EXAM: Primary | ICD-10-CM

## 2025-07-17 DIAGNOSIS — Z72.0 TOBACCO ABUSE: ICD-10-CM

## 2025-07-17 DIAGNOSIS — Z11.3 SCREENING FOR STD (SEXUALLY TRANSMITTED DISEASE): ICD-10-CM

## 2025-07-17 DIAGNOSIS — Z00.00 WELLNESS EXAMINATION: ICD-10-CM

## 2025-07-17 DIAGNOSIS — N64.4 BREAST PAIN: ICD-10-CM

## 2025-07-17 DIAGNOSIS — R10.2 PELVIC PAIN: ICD-10-CM

## 2025-07-17 LAB
BACTERIA #/AREA URNS AUTO: ABNORMAL /HPF
BILIRUB UR QL STRIP.AUTO: NEGATIVE
C TRACH DNA SPEC QL NAA+PROBE: NOT DETECTED
CLARITY UR: ABNORMAL
CLUE CELLS VAG QL WET PREP: ABNORMAL
COLOR UR AUTO: YELLOW
GLUCOSE UR QL STRIP: NORMAL
HGB UR QL STRIP: NEGATIVE
HYALINE CASTS #/AREA URNS LPF: ABNORMAL /LPF
KETONES UR QL STRIP: NEGATIVE
LEUKOCYTE ESTERASE UR QL STRIP: 500
MUCOUS THREADS URNS QL MICRO: ABNORMAL /LPF
N GONORRHOEA DNA SPEC QL NAA+PROBE: NOT DETECTED
NITRITE UR QL STRIP: NEGATIVE
PH UR STRIP: 5 [PH]
PROT UR QL STRIP: NEGATIVE
RBC #/AREA URNS AUTO: ABNORMAL /HPF
SP GR UR STRIP.AUTO: 1.01 (ref 1–1.03)
SPECIMEN SOURCE: NORMAL
SQUAMOUS #/AREA URNS LPF: ABNORMAL /HPF
T VAGINALIS VAG QL WET PREP: ABNORMAL
UROBILINOGEN UR STRIP-ACNC: NORMAL
WBC #/AREA URNS AUTO: ABNORMAL /HPF
WBC #/AREA VAG WET PREP: ABNORMAL
YEAST SPEC QL WET PREP: ABNORMAL

## 2025-07-17 PROCEDURE — 81001 URINALYSIS AUTO W/SCOPE: CPT

## 2025-07-17 PROCEDURE — G0101 CA SCREEN;PELVIC/BREAST EXAM: HCPCS | Mod: PBBFAC

## 2025-07-17 PROCEDURE — 87210 SMEAR WET MOUNT SALINE/INK: CPT

## 2025-07-17 PROCEDURE — 99215 OFFICE O/P EST HI 40 MIN: CPT | Mod: PBBFAC

## 2025-07-17 PROCEDURE — 87491 CHLMYD TRACH DNA AMP PROBE: CPT

## 2025-07-17 NOTE — PROGRESS NOTES
[facility] -  Gynecology / Women's Health Clinic     Subjective:      Patient ID: Fadumo Lynch is a 75 y.o. female.    Chief Complaint:  Well Woman      History of Present Illness:  The patient  here for annual exam. Hx of Hysterectomy d/t Denies history of abnormal paps. MG- 1/10/25 & BIRADS 1. Denies urinary complaints. Denies abnormal bleeding or discharge. Pt reports no STIs in the past and no concerns. Admits tobacco use. Denies fly hx of breast, ovarian, uterine or colon cancer. DEXA 2025 osteoporosis on Fosamax. Cologuard 10/2022 neg.    The patient presents to the clinic with c/o pelvic pain occ, starting 6months to 1 yr, admits not sexually active. Denies urinary complaints. Admits breast pain, right noticed about 1 week ago starting, denies trauma, denies nipple discharge.     GYN & OB History:  No LMP recorded. Patient has had a hysterectomy.     OB History    Para Term  AB Living   2 2    2   SAB IAB Ectopic Multiple Live Births       2      # Outcome Date GA Lbr George/2nd Weight Sex Type Anes PTL Lv   2 Para         LAZARO   1 Para         LAZARO       Past Medical History:   Diagnosis Date    CKD (chronic kidney disease)     COPD (chronic obstructive pulmonary disease)     Diabetes mellitus     Hyperlipidemia     Hypertension     Supraventricular tachycardia         Past Surgical History:   Procedure Laterality Date    CATARACT EXTRACTION, BILATERAL Bilateral 2024    TONSILLECTOMY AND ADENOIDECTOMY  1964    VAGINAL HYSTERECTOMY          Social History     Tobacco Use    Smoking status: Every Day     Current packs/day: 0.50     Average packs/day: 0.5 packs/day for 58.5 years (29.3 ttl pk-yrs)     Types: Cigarettes     Start date: 1967     Passive exposure: Never    Smokeless tobacco: Never    Tobacco comments:     Offered cessation; pt refused.   Substance and Sexual Activity    Alcohol use: Never    Drug use: Never    Sexual activity: Not Currently      Birth control/protection: See Surgical Hx        Current Outpatient Medications   Medication Instructions    acetaminophen (TYLENOL) 500 mg, Every 6 hours PRN    albuterol (PROVENTIL) 2.5 mg, Nebulization, 4 times daily PRN    albuterol (PROVENTIL/VENTOLIN HFA) 90 mcg/actuation inhaler 1-2 puffs, Inhalation, Every 6 hours PRN, Rescue    alendronate (FOSAMAX) 70 mg, Oral, Every 7 days    aspirin 81 mg, Daily    azelastine (ASTELIN) 137 mcg, Nasal, 2 times daily    blood sugar diagnostic Strp To check BG 2 times daily, to use with insurance preferred meter    blood-glucose meter kit To check BG 2 times daily, to use with insurance preferred meter    cholecalciferol (vitamin D3) 50,000 Units, Oral, Every 7 days    cholecalciferol (vitamin D3) 50,000 Units, Oral, Every 7 days    fluticasone-umeclidin-vilanter (TRELEGY ELLIPTA) 200-62.5-25 mcg inhaler 1 puff, Inhalation, Daily    lancets Misc To check BG 2 times daily, to use with insurance preferred meter    levalbuterol (XOPENEX HFA) 45 mcg/actuation inhaler 1-2 puffs, Inhalation, Every 4 hours PRN, Rescue    levalbuterol (XOPENEX) 1.25 mg, Nebulization, Every 4 hours PRN, Rescue    metFORMIN (GLUCOPHAGE) 500 mg, Oral, With breakfast    metoprolol succinate (TOPROL-XL) 50 mg, Oral, 2 times daily, ALONG WITH A 25MG TAB =75MG BID    metoprolol succinate (TOPROL-XL) 25 mg, Oral, 2 times daily    nitroGLYCERIN (NITROSTAT) 0.4 mg, Sublingual, Every 5 min PRN    ONETOUCH DELICA PLUS LANCET 33 gauge Misc Apply topically.    ONETOUCH ULTRA2 METER Misc     pravastatin (PRAVACHOL) 80 mg, Oral, Nightly    triamcinolone acetonide 0.1% (KENALOG) 0.1 % cream Topical (Top), 2 times daily, Use as directed    vitamin D (VITAMIN D3) 2,000 Units, Daily       Review of patient's allergies indicates:   Allergen Reactions    Medrol [methylprednisolone] Itching    Naproxen Hives    Zinc sulfate      Other reaction(s): LATE EFFECT OF BURNS OF OTHER SPECIFIED SITES         Review of  "Systems:  Review of Systems  Negative except for pertinent findings for positives per HPI.     Objective:   Physical Exam   Visit Vitals  /72 (BP Location: Left arm, Patient Position: Sitting)   Pulse 67   Temp 98.7 °F (37.1 °C) (Oral)   Resp 18   Ht 5' 2" (1.575 m)   Wt 68.5 kg (151 lb)   SpO2 96%   BMI 27.62 kg/m²       GENERAL: Well-developed female in no acute distress.  SKIN: Normal to inspection,warm, dry and intact.  BREASTS: No rashes or erythema. No masses, lumps, discharge. Right breast pain 9 oclock 5-6cm from nipple noted.  VULVA: General appearance WNL; external genitalia with no lesions or erythema.  BIMANUAL EXAM: Vaginal mucosa/vault atrophic, Vaginal cuff intact. Uterus/Cervix surgically absent. Bilateral adnexa reveal tenderness.  PSYCHIATRIC: Patient is oriented to person, place, and time. Mood and affect are normal.    Assessment:       ICD-10-CM ICD-9-CM   1. Well woman exam with routine gynecological exam  Z01.419 V72.31   2. Wellness examination  Z00.00 V70.0   3. Tobacco abuse  Z72.0 305.1   4. Screening for STD (sexually transmitted disease)  Z11.3 V74.5   5. Pelvic pain  R10.2 [id number]   6. Breast pain  N64.4 611.71       Plan:     1. Well woman exam with routine gynecological exam    2. Wellness examination  -     Ambulatory referral/consult to Gynecology    3. Tobacco abuse    4. Screening for STD (sexually transmitted disease)  -     Chlamydia/GC, PCR  -     Wet Prep, Genital    5. Pelvic pain  -     Chlamydia/GC, PCR  -     Wet Prep, Genital  -     US Pelvis Comp with Transvag NON-OB (xpd; Future; Expected date: 07/17/2025  -     Urinalysis, Reflex to Urine Culture Urine, Clean Catch    6. Breast pain  -     Mammo Digital Diagnostic Right with Mateo; Future; Expected date: 07/17/2025  -     US Breast Right Limited; Future; Expected date: 07/17/2025    Pelvic exam today, pap deferred d/t hysterectomy of benign causes    G/C and Wet prep  UA ordered with reflex to culture  TVUS " ordered  Refer to GYN docs for pelvic pain    MG diagnostic ordered - Right breast breast pain 9 oclock 5-6cm from nipple     Avoid caffeine (coffee, teas, chocolate, sodas). Drinking alcohol may also increase the risk of fibrocystic changes and breast pain. Good support bra both day and night may improve symptoms. Avoid activities which could cause injury to the breasts. Ice can help soothe the pain. Consider Vitamin E/Primrose Oil supplementation to reduce pain. Voltaren gel as needed can help for breast pain.      Smoking cessation counseling provided. Instructed on smoking cessation program through [facility] and pharmacological interventions to aid in cessation.    Call with any GYN concerns    Follow up in about 1 year (around 7/17/2026) for Annual.

## 2025-07-18 ENCOUNTER — TELEPHONE (OUTPATIENT)
Dept: GYNECOLOGY | Facility: CLINIC | Age: 75
End: 2025-07-18
Payer: MEDICARE

## 2025-07-18 NOTE — TELEPHONE ENCOUNTER
Tried calling patient, lvm. Will try to reach patient again.       ----- Message from ANGELITO Garcia sent at 7/18/2025  8:36 AM CDT -----  Please inform pt that u/a is indicating she has a UTI. The urine culture is pending. I will proceed with treatment until we get culture results. Will send Bactrim. Instruct to continue taking   antibiotics even if she starts to feel better and advised to avoid alcohol, caffeine, tea and carbonated drinks. Increase water intake and cranberry juice may be helpful. Pt instructed to always wipe   from front to back and make sure to empty bladder often and completely and before and after intercourse. If the culture indicates she needs other treatment, we will call.     ----- Message -----  From: Lab, Background User  Sent: 7/17/2025   4:58 PM CDT  To: ANGELITO Garcia

## 2025-07-18 NOTE — TELEPHONE ENCOUNTER
Patient returned my call. Informed patient of Ms Barragan's message below:  ----- Message from Laurel ANGELITO Moran sent at 7/18/2025  8:36 AM CDT -----  Please inform pt that u/a is indicating she has a UTI. The urine culture is pending. I will proceed with treatment until we get culture results. Will send Bactrim. Instruct to continue taking   antibiotics even if she starts to feel better and advised to avoid alcohol, caffeine, tea and carbonated drinks. Increase water intake and cranberry juice may be helpful. Pt instructed to always wipe   from front to back and make sure to empty bladder often and completely and before and after intercourse. If the culture indicates she needs other treatment, we will call.     Patient verbalized understanding. Patient stated that she just got done with her pelvic ultrasound and is requesting a call to go over the results once they are available.

## 2025-07-21 ENCOUNTER — TELEPHONE (OUTPATIENT)
Dept: GYNECOLOGY | Facility: CLINIC | Age: 75
End: 2025-07-21
Payer: MEDICARE

## 2025-07-21 NOTE — TELEPHONE ENCOUNTER
verified. Pelvic uls results explained, no concerns noted. Pelvic pain exam with GYN docs upcoming, planning to attend.

## 2025-07-31 RX ORDER — METFORMIN HYDROCHLORIDE 500 MG/1
TABLET ORAL
Qty: 90 TABLET | Refills: 2 | OUTPATIENT
Start: 2025-07-31

## 2025-07-31 NOTE — TELEPHONE ENCOUNTER
Rx refill requests too soon.          Outpatient Medication Detail     Disp Refills Start End JEFRY   metFORMIN (GLUCOPHAGE) 500 MG tablet 90 tablet 2 4/10/2025 -- No   Sig - Route: Take 1 tablet (500 mg total) by mouth daily with breakfast. - Oral   Sent to pharmacy as: metFORMIN (GLUCOPHAGE) 500 MG tablet   Class: Normal   Order: [id number]

## 2025-08-12 ENCOUNTER — OFFICE VISIT (OUTPATIENT)
Dept: INTERNAL MEDICINE | Facility: CLINIC | Age: 75
End: 2025-08-12
Payer: MEDICARE

## 2025-08-12 ENCOUNTER — HOSPITAL ENCOUNTER (OUTPATIENT)
Dept: RADIOLOGY | Facility: HOSPITAL | Age: 75
Discharge: HOME | End: 2025-08-12
Attending: NURSE PRACTITIONER
Payer: MEDICARE

## 2025-08-12 VITALS
DIASTOLIC BLOOD PRESSURE: 70 MMHG | RESPIRATION RATE: 16 BRPM | OXYGEN SATURATION: 95 % | HEIGHT: 62 IN | HEART RATE: 78 BPM | SYSTOLIC BLOOD PRESSURE: 128 MMHG | WEIGHT: 142.63 LBS | BODY MASS INDEX: 26.25 KG/M2 | TEMPERATURE: 99 F

## 2025-08-12 DIAGNOSIS — R10.2 PELVIC PAIN: ICD-10-CM

## 2025-08-12 DIAGNOSIS — I47.10 SUPRAVENTRICULAR TACHYCARDIA: ICD-10-CM

## 2025-08-12 DIAGNOSIS — R30.0 DYSURIA: ICD-10-CM

## 2025-08-12 DIAGNOSIS — M54.2 NECK PAIN: ICD-10-CM

## 2025-08-12 DIAGNOSIS — F17.210 CIGARETTE NICOTINE DEPENDENCE WITHOUT COMPLICATION: ICD-10-CM

## 2025-08-12 DIAGNOSIS — F32.A ANXIETY AND DEPRESSION: ICD-10-CM

## 2025-08-12 DIAGNOSIS — F41.9 ANXIETY AND DEPRESSION: ICD-10-CM

## 2025-08-12 PROCEDURE — 74019 RADEX ABDOMEN 2 VIEWS: CPT | Mod: TC

## 2025-08-12 PROCEDURE — 1160F RVW MEDS BY RX/DR IN RCRD: CPT | Mod: CPTII,,, | Performed by: NURSE PRACTITIONER

## 2025-08-12 PROCEDURE — 3008F BODY MASS INDEX DOCD: CPT | Mod: CPTII,,, | Performed by: NURSE PRACTITIONER

## 2025-08-12 PROCEDURE — 3078F DIAST BP <80 MM HG: CPT | Mod: CPTII,,, | Performed by: NURSE PRACTITIONER

## 2025-08-12 PROCEDURE — 3066F NEPHROPATHY DOC TX: CPT | Mod: CPTII,,, | Performed by: NURSE PRACTITIONER

## 2025-08-12 PROCEDURE — 1125F AMNT PAIN NOTED PAIN PRSNT: CPT | Mod: CPTII,,, | Performed by: NURSE PRACTITIONER

## 2025-08-12 PROCEDURE — 3044F HG A1C LEVEL LT 7.0%: CPT | Mod: CPTII,,, | Performed by: NURSE PRACTITIONER

## 2025-08-12 PROCEDURE — 3074F SYST BP LT 130 MM HG: CPT | Mod: CPTII,,, | Performed by: NURSE PRACTITIONER

## 2025-08-12 PROCEDURE — 3288F FALL RISK ASSESSMENT DOCD: CPT | Mod: CPTII,,, | Performed by: NURSE PRACTITIONER

## 2025-08-12 PROCEDURE — 99214 OFFICE O/P EST MOD 30 MIN: CPT | Mod: S$PBB,25,, | Performed by: NURSE PRACTITIONER

## 2025-08-12 PROCEDURE — 99215 OFFICE O/P EST HI 40 MIN: CPT | Mod: PBBFAC,25 | Performed by: NURSE PRACTITIONER

## 2025-08-12 PROCEDURE — 3061F NEG MICROALBUMINURIA REV: CPT | Mod: CPTII,,, | Performed by: NURSE PRACTITIONER

## 2025-08-12 PROCEDURE — 1159F MED LIST DOCD IN RCRD: CPT | Mod: CPTII,,, | Performed by: NURSE PRACTITIONER

## 2025-08-12 PROCEDURE — 99406 BEHAV CHNG SMOKING 3-10 MIN: CPT | Mod: S$PBB,,, | Performed by: NURSE PRACTITIONER

## 2025-08-12 PROCEDURE — 1101F PT FALLS ASSESS-DOCD LE1/YR: CPT | Mod: CPTII,,, | Performed by: NURSE PRACTITIONER

## 2025-08-12 RX ORDER — SERTRALINE HYDROCHLORIDE 25 MG/1
25 TABLET, FILM COATED ORAL DAILY
Qty: 30 TABLET | Refills: 6 | Status: SHIPPED | OUTPATIENT
Start: 2025-08-12 | End: 2026-08-12

## 2025-08-12 RX ORDER — BACLOFEN 10 MG/1
10 TABLET ORAL 3 TIMES DAILY PRN
Qty: 90 TABLET | Refills: 0 | Status: SHIPPED | OUTPATIENT
Start: 2025-08-12 | End: 2026-08-12

## 2025-08-19 ENCOUNTER — TELEPHONE (OUTPATIENT)
Dept: INTERNAL MEDICINE | Facility: CLINIC | Age: 75
End: 2025-08-19
Payer: MEDICARE

## 2025-08-27 ENCOUNTER — OFFICE VISIT (OUTPATIENT)
Dept: GYNECOLOGY | Facility: CLINIC | Age: 75
End: 2025-08-27
Payer: MEDICARE

## 2025-08-27 VITALS
RESPIRATION RATE: 18 BRPM | OXYGEN SATURATION: 100 % | TEMPERATURE: 99 F | DIASTOLIC BLOOD PRESSURE: 71 MMHG | WEIGHT: 139 LBS | SYSTOLIC BLOOD PRESSURE: 119 MMHG | HEART RATE: 68 BPM | HEIGHT: 62 IN | BODY MASS INDEX: 25.58 KG/M2

## 2025-08-27 DIAGNOSIS — R10.2 PELVIC PAIN: ICD-10-CM

## 2025-08-27 DIAGNOSIS — R35.0 URINARY FREQUENCY: Primary | ICD-10-CM

## 2025-08-27 PROCEDURE — 99215 OFFICE O/P EST HI 40 MIN: CPT | Mod: PBBFAC

## 2025-08-28 ENCOUNTER — APPOINTMENT (OUTPATIENT)
Dept: LAB | Facility: HOSPITAL | Age: 75
End: 2025-08-28
Payer: MEDICARE

## 2025-08-28 ENCOUNTER — TELEPHONE (OUTPATIENT)
Dept: INTERNAL MEDICINE | Facility: CLINIC | Age: 75
End: 2025-08-28
Payer: MEDICARE

## 2025-08-28 ENCOUNTER — TELEPHONE (OUTPATIENT)
Dept: GYNECOLOGY | Facility: CLINIC | Age: 75
End: 2025-08-28
Payer: MEDICARE

## 2025-08-28 LAB
BACTERIA #/AREA URNS AUTO: ABNORMAL /HPF
BILIRUB UR QL STRIP.AUTO: NEGATIVE
CLARITY UR: CLEAR
COLOR UR AUTO: YELLOW
GLUCOSE UR QL STRIP: NORMAL
HGB UR QL STRIP: NEGATIVE
HYALINE CASTS #/AREA URNS LPF: ABNORMAL /LPF
KETONES UR QL STRIP: NEGATIVE
LEUKOCYTE ESTERASE UR QL STRIP: 250
MUCOUS THREADS URNS QL MICRO: ABNORMAL /LPF
NITRITE UR QL STRIP: NEGATIVE
PH UR STRIP: 5.5 [PH]
PROT UR QL STRIP: NEGATIVE
RBC #/AREA URNS AUTO: ABNORMAL /HPF
SP GR UR STRIP.AUTO: 1.02 (ref 1–1.03)
SQUAMOUS #/AREA URNS LPF: ABNORMAL /HPF
UROBILINOGEN UR STRIP-ACNC: ABNORMAL
WBC #/AREA URNS AUTO: ABNORMAL /HPF

## 2025-08-29 ENCOUNTER — TELEPHONE (OUTPATIENT)
Dept: INTERNAL MEDICINE | Facility: CLINIC | Age: 75
End: 2025-08-29
Payer: MEDICARE